# Patient Record
Sex: MALE | Race: WHITE | NOT HISPANIC OR LATINO | Employment: OTHER | URBAN - METROPOLITAN AREA
[De-identification: names, ages, dates, MRNs, and addresses within clinical notes are randomized per-mention and may not be internally consistent; named-entity substitution may affect disease eponyms.]

---

## 2018-11-02 ENCOUNTER — OFFICE VISIT (OUTPATIENT)
Dept: URGENT CARE | Facility: CLINIC | Age: 48
End: 2018-11-02
Payer: MEDICARE

## 2018-11-02 ENCOUNTER — HOSPITAL ENCOUNTER (OUTPATIENT)
Dept: LAB | Facility: MEDICAL CENTER | Age: 48
End: 2018-11-02
Attending: PHYSICIAN ASSISTANT
Payer: MEDICARE

## 2018-11-02 VITALS
WEIGHT: 166 LBS | SYSTOLIC BLOOD PRESSURE: 120 MMHG | RESPIRATION RATE: 16 BRPM | DIASTOLIC BLOOD PRESSURE: 80 MMHG | OXYGEN SATURATION: 98 % | HEART RATE: 82 BPM | TEMPERATURE: 98 F | HEIGHT: 67 IN | BODY MASS INDEX: 26.06 KG/M2

## 2018-11-02 DIAGNOSIS — R10.13 EPIGASTRIC ABDOMINAL PAIN: ICD-10-CM

## 2018-11-02 DIAGNOSIS — Z91.89: ICD-10-CM

## 2018-11-02 DIAGNOSIS — K85.90 ACUTE PANCREATITIS, UNSPECIFIED COMPLICATION STATUS, UNSPECIFIED PANCREATITIS TYPE: Primary | ICD-10-CM

## 2018-11-02 LAB
ALBUMIN SERPL BCP-MCNC: 4.2 G/DL (ref 3.2–4.9)
ALBUMIN/GLOB SERPL: 1.4 G/DL
ALP SERPL-CCNC: 48 U/L (ref 30–99)
ALT SERPL-CCNC: 19 U/L (ref 2–50)
ANION GAP SERPL CALC-SCNC: 6 MMOL/L (ref 0–11.9)
AST SERPL-CCNC: 19 U/L (ref 12–45)
BASOPHILS # BLD AUTO: 0.4 % (ref 0–1.8)
BASOPHILS # BLD: 0.03 K/UL (ref 0–0.12)
BILIRUB SERPL-MCNC: 0.3 MG/DL (ref 0.1–1.5)
BUN SERPL-MCNC: 16 MG/DL (ref 8–22)
CALCIUM SERPL-MCNC: 10 MG/DL (ref 8.5–10.5)
CHLORIDE SERPL-SCNC: 103 MMOL/L (ref 96–112)
CO2 SERPL-SCNC: 30 MMOL/L (ref 20–33)
CREAT SERPL-MCNC: 1.04 MG/DL (ref 0.5–1.4)
EOSINOPHIL # BLD AUTO: 0.18 K/UL (ref 0–0.51)
EOSINOPHIL NFR BLD: 2.2 % (ref 0–6.9)
ERYTHROCYTE [DISTWIDTH] IN BLOOD BY AUTOMATED COUNT: 44.9 FL (ref 35.9–50)
GLOBULIN SER CALC-MCNC: 3.1 G/DL (ref 1.9–3.5)
GLUCOSE SERPL-MCNC: 85 MG/DL (ref 65–99)
HCT VFR BLD AUTO: 46.5 % (ref 42–52)
HGB BLD-MCNC: 15.3 G/DL (ref 14–18)
IMM GRANULOCYTES # BLD AUTO: 0.02 K/UL (ref 0–0.11)
IMM GRANULOCYTES NFR BLD AUTO: 0.2 % (ref 0–0.9)
LIPASE SERPL-CCNC: 143 U/L (ref 11–82)
LYMPHOCYTES # BLD AUTO: 2.9 K/UL (ref 1–4.8)
LYMPHOCYTES NFR BLD: 34.8 % (ref 22–41)
MCH RBC QN AUTO: 31.4 PG (ref 27–33)
MCHC RBC AUTO-ENTMCNC: 32.9 G/DL (ref 33.7–35.3)
MCV RBC AUTO: 95.5 FL (ref 81.4–97.8)
MONOCYTES # BLD AUTO: 0.6 K/UL (ref 0–0.85)
MONOCYTES NFR BLD AUTO: 7.2 % (ref 0–13.4)
NEUTROPHILS # BLD AUTO: 4.61 K/UL (ref 1.82–7.42)
NEUTROPHILS NFR BLD: 55.2 % (ref 44–72)
NRBC # BLD AUTO: 0 K/UL
NRBC BLD-RTO: 0 /100 WBC
PLATELET # BLD AUTO: 327 K/UL (ref 164–446)
PMV BLD AUTO: 10.4 FL (ref 9–12.9)
POTASSIUM SERPL-SCNC: 4.3 MMOL/L (ref 3.6–5.5)
PROT SERPL-MCNC: 7.3 G/DL (ref 6–8.2)
RBC # BLD AUTO: 4.87 M/UL (ref 4.7–6.1)
SODIUM SERPL-SCNC: 139 MMOL/L (ref 135–145)
WBC # BLD AUTO: 8.3 K/UL (ref 4.8–10.8)

## 2018-11-02 PROCEDURE — 85025 COMPLETE CBC W/AUTO DIFF WBC: CPT

## 2018-11-02 PROCEDURE — 80053 COMPREHEN METABOLIC PANEL: CPT

## 2018-11-02 PROCEDURE — 36415 COLL VENOUS BLD VENIPUNCTURE: CPT

## 2018-11-02 PROCEDURE — 99204 OFFICE O/P NEW MOD 45 MIN: CPT | Performed by: PHYSICIAN ASSISTANT

## 2018-11-02 PROCEDURE — 83690 ASSAY OF LIPASE: CPT

## 2018-11-02 RX ORDER — OMEPRAZOLE 20 MG/1
20 CAPSULE, DELAYED RELEASE ORAL 2 TIMES DAILY
Qty: 20 CAP | Refills: 3 | Status: SHIPPED | OUTPATIENT
Start: 2018-11-02 | End: 2018-11-05

## 2018-11-02 NOTE — PATIENT INSTRUCTIONS
Heartburn  Heartburn is a type of pain or discomfort that can happen in the throat or chest. It is often described as a burning pain. It may also cause a bad taste in the mouth. Heartburn may feel worse when you lie down or bend over, and it is often worse at night. Heartburn may be caused by stomach contents that move back up into the esophagus (reflux).  Follow these instructions at home:  Take these actions to decrease your discomfort and to help avoid complications.  Diet  · Follow a diet as recommended by your health care provider. This may involve avoiding foods and drinks such as:  ¨ Coffee and tea (with or without caffeine).  ¨ Drinks that contain alcohol.  ¨ Energy drinks and sports drinks.  ¨ Carbonated drinks or sodas.  ¨ Chocolate and cocoa.  ¨ Peppermint and mint flavorings.  ¨ Garlic and onions.  ¨ Horseradish.  ¨ Spicy and acidic foods, including peppers, chili powder, gonzalez powder, vinegar, hot sauces, and barbecue sauce.  ¨ Citrus fruit juices and citrus fruits, such as oranges, holli, and limes.  ¨ Tomato-based foods, such as red sauce, chili, salsa, and pizza with red sauce.  ¨ Fried and fatty foods, such as donuts, french fries, potato chips, and high-fat dressings.  ¨ High-fat meats, such as hot dogs and fatty cuts of red and white meats, such as rib eye steak, sausage, ham, and medrano.  ¨ High-fat dairy items, such as whole milk, butter, and cream cheese.  · Eat small, frequent meals instead of large meals.  · Avoid drinking large amounts of liquid with your meals.  · Avoid eating meals during the 2-3 hours before bedtime.  · Avoid lying down right after you eat.  · Do not exercise right after you eat.  General instructions  · Pay attention to any changes in your symptoms.  · Take over-the-counter and prescription medicines only as told by your health care provider. Do not take aspirin, ibuprofen, or other NSAIDs unless your health care provider told you to do so.  · Do not use any tobacco  products, including cigarettes, chewing tobacco, and e-cigarettes. If you need help quitting, ask your health care provider.  · Wear loose-fitting clothing. Do not wear anything tight around your waist that causes pressure on your abdomen.  · Raise (elevate) the head of your bed about 6 inches (15 cm).  · Try to reduce your stress, such as with yoga or meditation. If you need help reducing stress, ask your health care provider.  · If you are overweight, reduce your weight to an amount that is healthy for you. Ask your health care provider for guidance about a safe weight loss goal.  · Keep all follow-up visits as told by your health care provider. This is important.  Contact a health care provider if:  · You have new symptoms.  · You have unexplained weight loss.  · You have difficulty swallowing, or it hurts to swallow.  · You have wheezing or a persistent cough.  · Your symptoms do not improve with treatment.  · You have frequent heartburn for more than two weeks.  Get help right away if:  · You have pain in your arms, neck, jaw, teeth, or back.  · You feel sweaty, dizzy, or light-headed.  · You have chest pain or shortness of breath.  · You vomit and your vomit looks like blood or coffee grounds.  · Your stool is bloody or black.  This information is not intended to replace advice given to you by your health care provider. Make sure you discuss any questions you have with your health care provider.  Document Released: 05/06/2010 Document Revised: 05/25/2017 Document Reviewed: 04/13/2016  [employer] Interactive Patient Education © 2017 [employer]

## 2018-11-03 NOTE — PROGRESS NOTES
"Subjective:      Pt is a 48 y.o. male who presents with Spider Bite (x 2 wks, spider bite on Rt. arm, upper abdominal pain off / on)            HPI  Pt notes upper abd pain x 2 weeks on and off since suspected \"black  bite\" he received while outside. Pt notes that area on his upper right arm has healed but canno think of any other connection to the new onset upper abd pain. PT notes he has had many black  bites in the past but shortly afterwards noted epigastric abd pain, on and off x 2 weeks as well. PT states he consumes alcohol only \"two days a year\". Pt denies NVD. Pt has not taken any Rx medications for this condition. Pt states the pain is a 7/10, aching in nature and worse at night. Pt denies CP, SOB, NVD, paresthesias, headaches, dizziness, change in vision, hives, or other joint pain. The pt's medication list, problem list, and allergies have been evaluated and reviewed during today's visit.    PMH:  Negative per pt.      PSH:  Negative per pt.      Fam Hx:  the patient's family history is not pertinent to their current complaint      Soc HX:  Social History     Social History   • Marital status: Single     Spouse name: N/A   • Number of children: N/A   • Years of education: N/A     Occupational History   • Not on file.     Social History Main Topics   • Smoking status: Current Every Day Smoker     Packs/day: 1.00     Years: 22.00     Types: Cigarettes   • Smokeless tobacco: Never Used   • Alcohol use Yes      Comment: occ   • Drug use: No   • Sexual activity: Not on file     Other Topics Concern   • Not on file     Social History Narrative   • No narrative on file         Medications:    Current Outpatient Prescriptions:   •  omeprazole (PRILOSEC) 20 MG delayed-release capsule, Take 1 Cap by mouth 2 times a day for 10 days., Disp: 20 Cap, Rfl: 3      Allergies:  Patient has no known allergies.       ROS  Constitutional: Negative for fever, chills and malaise/fatigue.   HENT: Negative for " "congestion and sore throat.    Eyes: Negative for blurred vision, double vision and photophobia.   Respiratory: Negative for cough and shortness of breath.  Cardiovascular: Negative for chest pain and palpitations.   Gastrointestinal: Negative for heartburn, nausea, vomiting, diarrhea and constipation. POS for upper abd pain  Genitourinary: Negative for dysuria and flank pain.   Musculoskeletal: Negative for joint pain and myalgias.   Skin: Negative for itching and rash. POS for healing insect/or spider bite of right upper arm  Neurological: Negative for dizziness, tingling and headaches.   Endo/Heme/Allergies: Does not bruise/bleed easily.   Psychiatric/Behavioral: Negative for depression. The patient is not nervous/anxious.           Objective:     /80 (BP Location: Left arm, Patient Position: Sitting, BP Cuff Size: Adult)   Pulse 82   Temp 36.7 °C (98 °F) (Temporal)   Resp 16   Ht 1.702 m (5' 7\")   Wt 75.3 kg (166 lb)   SpO2 98%   BMI 26.00 kg/m²      Physical Exam   Abdominal: Soft. Normal appearance and bowel sounds are normal. He exhibits no shifting dullness, no distension, no pulsatile liver, no fluid wave, no abdominal bruit, no ascites, no pulsatile midline mass and no mass. There is no hepatosplenomegaly. There is tenderness in the epigastric area. There is guarding. There is no rigidity, no rebound, no CVA tenderness, no tenderness at McBurney's point and negative Brito's sign. No hernia.       Skin: Skin is warm. Capillary refill takes less than 2 seconds. No rash noted.               Constitutional: PT is oriented to person, place, and time. PT appears well-developed and well-nourished. No distress.   HENT:   Head: Normocephalic and atraumatic.   Mouth/Throat: Oropharynx is clear and moist. No oropharyngeal exudate.   Eyes: Conjunctivae normal and EOM are normal. Pupils are equal, round, and reactive to light.   Neck: Normal range of motion. Neck supple. No thyromegaly present.   " "  Cardiovascular: Normal rate, regular rhythm, normal heart sounds and intact distal pulses.  Exam reveals no gallop and no friction rub.    No murmur heard.  Pulmonary/Chest: Effort normal and breath sounds normal. No respiratory distress. PT has no wheezes. PT has no rales. Pt exhibits no tenderness.   Musculoskeletal: Normal range of motion. PT exhibits no edema and no tenderness.   Neurological: PT is alert and oriented to person, place, and time. PT has normal reflexes. No cranial nerve deficit.       Psychiatric: PT has a normal mood and affect. PT behavior is normal. Judgment and thought content normal.          Assessment/Plan:     1. Acute pancreatitis, unspecified complication status, unspecified pancreatitis type      2. Epigastric abdominal pain    - omeprazole (PRILOSEC) 20 MG delayed-release capsule; Take 1 Cap by mouth 2 times a day for 10 days.  Dispense: 20 Cap; Refill: 3  - LIPASE; Future  - CBC WITH DIFFERENTIAL; Future  - COMP METABOLIC PANEL; Future    3. History of venomous spider bite    \"Black \" bite appears well healed on right biceps region and is noted to be over 2 weeks old  PT called and notified of elevated Lipase to 143 (normal range 11-82) and likely pancreatitis diagnosis and encouraged to get checked out at a higher level of triage at the ER for further work-up and management as he notes the pain continues today and he is having daily flare ups x 2 weeks.   Rest, fluids encouraged.  AVS with medical info given.  Pt was in full understanding and agreement with the plan.  Follow-up as needed if symptoms worsen or fail to improve.        "

## 2018-11-05 ENCOUNTER — OFFICE VISIT (OUTPATIENT)
Dept: URGENT CARE | Facility: MEDICAL CENTER | Age: 48
End: 2018-11-05
Payer: MEDICARE

## 2018-11-05 ENCOUNTER — HOSPITAL ENCOUNTER (EMERGENCY)
Facility: MEDICAL CENTER | Age: 48
End: 2018-11-05
Attending: EMERGENCY MEDICINE
Payer: MEDICARE

## 2018-11-05 ENCOUNTER — APPOINTMENT (OUTPATIENT)
Dept: RADIOLOGY | Facility: MEDICAL CENTER | Age: 48
End: 2018-11-05
Attending: EMERGENCY MEDICINE
Payer: MEDICARE

## 2018-11-05 VITALS
WEIGHT: 166.01 LBS | SYSTOLIC BLOOD PRESSURE: 168 MMHG | OXYGEN SATURATION: 99 % | HEIGHT: 67 IN | TEMPERATURE: 97.7 F | HEART RATE: 62 BPM | DIASTOLIC BLOOD PRESSURE: 102 MMHG | RESPIRATION RATE: 16 BRPM | BODY MASS INDEX: 26.06 KG/M2

## 2018-11-05 VITALS
HEART RATE: 75 BPM | TEMPERATURE: 97.9 F | HEIGHT: 67 IN | WEIGHT: 166 LBS | SYSTOLIC BLOOD PRESSURE: 178 MMHG | DIASTOLIC BLOOD PRESSURE: 104 MMHG | OXYGEN SATURATION: 100 % | BODY MASS INDEX: 26.06 KG/M2

## 2018-11-05 DIAGNOSIS — R10.13 EPIGASTRIC ABDOMINAL PAIN: ICD-10-CM

## 2018-11-05 DIAGNOSIS — K85.90 ACUTE PANCREATITIS, UNSPECIFIED COMPLICATION STATUS, UNSPECIFIED PANCREATITIS TYPE: ICD-10-CM

## 2018-11-05 DIAGNOSIS — R10.13 EPIGASTRIC PAIN: ICD-10-CM

## 2018-11-05 LAB
ALBUMIN SERPL BCP-MCNC: 4 G/DL (ref 3.2–4.9)
ALBUMIN/GLOB SERPL: 1.3 G/DL
ALP SERPL-CCNC: 50 U/L (ref 30–99)
ALT SERPL-CCNC: 21 U/L (ref 2–50)
ANION GAP SERPL CALC-SCNC: 8 MMOL/L (ref 0–11.9)
AST SERPL-CCNC: 21 U/L (ref 12–45)
BASOPHILS # BLD AUTO: 0.3 % (ref 0–1.8)
BASOPHILS # BLD: 0.03 K/UL (ref 0–0.12)
BILIRUB SERPL-MCNC: 0.8 MG/DL (ref 0.1–1.5)
BUN SERPL-MCNC: 17 MG/DL (ref 8–22)
CALCIUM SERPL-MCNC: 8.7 MG/DL (ref 8.4–10.2)
CHLORIDE SERPL-SCNC: 101 MMOL/L (ref 96–112)
CO2 SERPL-SCNC: 23 MMOL/L (ref 20–33)
CREAT SERPL-MCNC: 1.04 MG/DL (ref 0.5–1.4)
EKG IMPRESSION: NORMAL
EOSINOPHIL # BLD AUTO: 0.19 K/UL (ref 0–0.51)
EOSINOPHIL NFR BLD: 1.9 % (ref 0–6.9)
ERYTHROCYTE [DISTWIDTH] IN BLOOD BY AUTOMATED COUNT: 42.2 FL (ref 35.9–50)
GLOBULIN SER CALC-MCNC: 3.2 G/DL (ref 1.9–3.5)
GLUCOSE SERPL-MCNC: 101 MG/DL (ref 65–99)
HCT VFR BLD AUTO: 42.2 % (ref 42–52)
HGB BLD-MCNC: 14.6 G/DL (ref 14–18)
IMM GRANULOCYTES # BLD AUTO: 0.03 K/UL (ref 0–0.11)
IMM GRANULOCYTES NFR BLD AUTO: 0.3 % (ref 0–0.9)
LIPASE SERPL-CCNC: 40 U/L (ref 7–58)
LYMPHOCYTES # BLD AUTO: 2.41 K/UL (ref 1–4.8)
LYMPHOCYTES NFR BLD: 23.8 % (ref 22–41)
MCH RBC QN AUTO: 31.9 PG (ref 27–33)
MCHC RBC AUTO-ENTMCNC: 34.6 G/DL (ref 33.7–35.3)
MCV RBC AUTO: 92.3 FL (ref 81.4–97.8)
MONOCYTES # BLD AUTO: 0.75 K/UL (ref 0–0.85)
MONOCYTES NFR BLD AUTO: 7.4 % (ref 0–13.4)
NEUTROPHILS # BLD AUTO: 6.71 K/UL (ref 1.82–7.42)
NEUTROPHILS NFR BLD: 66.3 % (ref 44–72)
NRBC # BLD AUTO: 0 K/UL
NRBC BLD-RTO: 0 /100 WBC
PLATELET # BLD AUTO: 282 K/UL (ref 164–446)
PMV BLD AUTO: 9.6 FL (ref 9–12.9)
POTASSIUM SERPL-SCNC: 3.6 MMOL/L (ref 3.6–5.5)
PROT SERPL-MCNC: 7.2 G/DL (ref 6–8.2)
RBC # BLD AUTO: 4.57 M/UL (ref 4.7–6.1)
SODIUM SERPL-SCNC: 132 MMOL/L (ref 135–145)
WBC # BLD AUTO: 10.1 K/UL (ref 4.8–10.8)

## 2018-11-05 PROCEDURE — 96374 THER/PROPH/DIAG INJ IV PUSH: CPT

## 2018-11-05 PROCEDURE — 99285 EMERGENCY DEPT VISIT HI MDM: CPT

## 2018-11-05 PROCEDURE — 93005 ELECTROCARDIOGRAM TRACING: CPT | Performed by: EMERGENCY MEDICINE

## 2018-11-05 PROCEDURE — 85025 COMPLETE CBC W/AUTO DIFF WBC: CPT

## 2018-11-05 PROCEDURE — 96375 TX/PRO/DX INJ NEW DRUG ADDON: CPT

## 2018-11-05 PROCEDURE — 700117 HCHG RX CONTRAST REV CODE 255: Performed by: EMERGENCY MEDICINE

## 2018-11-05 PROCEDURE — 93005 ELECTROCARDIOGRAM TRACING: CPT

## 2018-11-05 PROCEDURE — 83690 ASSAY OF LIPASE: CPT

## 2018-11-05 PROCEDURE — 80053 COMPREHEN METABOLIC PANEL: CPT

## 2018-11-05 PROCEDURE — 700111 HCHG RX REV CODE 636 W/ 250 OVERRIDE (IP): Performed by: EMERGENCY MEDICINE

## 2018-11-05 PROCEDURE — 74177 CT ABD & PELVIS W/CONTRAST: CPT

## 2018-11-05 PROCEDURE — 36415 COLL VENOUS BLD VENIPUNCTURE: CPT

## 2018-11-05 RX ORDER — SUCRALFATE 1 G/1
1 TABLET ORAL
Qty: 12 TAB | Refills: 0 | Status: SHIPPED | OUTPATIENT
Start: 2018-11-05 | End: 2018-11-08

## 2018-11-05 RX ORDER — ONDANSETRON 2 MG/ML
4 INJECTION INTRAMUSCULAR; INTRAVENOUS ONCE
Status: COMPLETED | OUTPATIENT
Start: 2018-11-05 | End: 2018-11-05

## 2018-11-05 RX ORDER — OMEPRAZOLE 20 MG/1
20 CAPSULE, DELAYED RELEASE ORAL DAILY
Qty: 30 CAP | Refills: 0 | Status: SHIPPED | OUTPATIENT
Start: 2018-11-05 | End: 2019-05-05

## 2018-11-05 RX ORDER — HYDROCODONE BITARTRATE AND ACETAMINOPHEN 5; 325 MG/1; MG/1
1-2 TABLET ORAL EVERY 6 HOURS PRN
Qty: 12 TAB | Refills: 0 | Status: SHIPPED | OUTPATIENT
Start: 2018-11-05 | End: 2018-11-08

## 2018-11-05 RX ORDER — COVID-19 ANTIGEN TEST
220 KIT MISCELLANEOUS PRN
Status: SHIPPED | COMMUNITY
End: 2019-06-10

## 2018-11-05 RX ORDER — MORPHINE SULFATE 4 MG/ML
4 INJECTION, SOLUTION INTRAMUSCULAR; INTRAVENOUS ONCE
Status: COMPLETED | OUTPATIENT
Start: 2018-11-05 | End: 2018-11-05

## 2018-11-05 RX ADMIN — MORPHINE SULFATE 4 MG: 4 INJECTION INTRAVENOUS at 12:38

## 2018-11-05 RX ADMIN — ONDANSETRON 4 MG: 2 INJECTION INTRAMUSCULAR; INTRAVENOUS at 12:38

## 2018-11-05 RX ADMIN — IOHEXOL 100 ML: 350 INJECTION, SOLUTION INTRAVENOUS at 14:10

## 2018-11-05 ASSESSMENT — PAIN SCALES - GENERAL
PAINLEVEL_OUTOF10: 1
PAINLEVEL_OUTOF10: 10

## 2018-11-05 ASSESSMENT — ENCOUNTER SYMPTOMS: ABDOMINAL PAIN: 1

## 2018-11-05 NOTE — ED NOTES
D/c pt home with family , 3 rx given . Pt aware of f/u instructions PMD ,   Aware to f/u in regards to his HTN with his pmd  aware to return for any changes or concerns. No further questions upon d/c home from ed  Pt given and understands controlled substance use consent form signed  Pt aware to no drive or operate vehicle after receiving or taking medication

## 2018-11-05 NOTE — ED PROVIDER NOTES
ED Provider Note      ER PROVIDER NOTE      CHIEF COMPLAINT  Chief Complaint   Patient presents with   • Abdominal Pain     Pt c/o abd pain x 2 weeks. Pt c/o little nausea and diarrhea. Pt states sent from  for further evaluation. Pt states Dx with pancreatitis.        HPI  Tima Rizzo is a 48 y.o. male who presents to the emergency department complaining of epigastric pain.  Patient reports he has had pain over the last 2 weeks that has been gradually increasing.  He has had some slight nausea but no vomiting.  Has been having normal bowel movements without diarrhea constipation or blood.  States the pain has become sharp and fairly constant with no alleviating or aggravating factors.  He was seen at urgent care with elevated lipase 2 days ago.  No prior abdominal surgeries, he does not drink alcohol but does take Aleve on a frequent basis    REVIEW OF SYSTEMS  Pertinent positives include abdominal pain. Pertinent negatives include no fever. See HPI for details. All other systems reviewed and are negative.    PAST MEDICAL HISTORY       SURGICAL HISTORY  patient denies any surgical history    FAMILY HISTORY  No family history on file.    SOCIAL HISTORY  Social History     Social History   • Marital status: Single     Spouse name: N/A   • Number of children: N/A   • Years of education: N/A     Social History Main Topics   • Smoking status: Current Every Day Smoker     Packs/day: 0.50     Years: 22.00     Types: Cigarettes   • Smokeless tobacco: Never Used   • Alcohol use No      Comment: occ   • Drug use: Yes     Types: Marijuana   • Sexual activity: Not on file     Other Topics Concern   • Not on file     Social History Narrative   • No narrative on file      History   Drug Use   • Types: Marijuana       CURRENT MEDICATIONS  Home Medications     Reviewed by Stefani Au (Pharmacy Tech) on 11/05/18 at 1233  Med List Status: Complete   Medication Last Dose Status   Naproxen Sodium (ALEVE) 220 MG Cap  "> 2 days Active                ALLERGIES  No Known Allergies    PHYSICAL EXAM  VITAL SIGNS: BP (!) 174/120   Pulse 60   Temp 36.5 °C (97.7 °F)   Resp 12   Ht 1.702 m (5' 7\")   Wt 75.3 kg (166 lb 0.1 oz)   SpO2 99%   BMI 26.00 kg/m²   Pulse ox interpretation: I interpret this pulse ox as normal.    Constitutional: Alert, uncomfortable  HENT: No signs of trauma, Bilateral external ears normal, Nose normal.   Eyes: Pupils are equal and reactive, Conjunctiva normal, Non-icteric.   Neck: Normal range of motion, No tenderness, Supple, No stridor.   Lymphatic: No lymphadenopathy noted.   Cardiovascular: Regular rate and rhythm, no murmurs.   Thorax & Lungs: Normal breath sounds, No respiratory distress, No wheezing, No chest tenderness.   Abdomen: Bowel sounds normal, Soft, moderate epigastric tenderness, No masses, No pulsatile masses. No peritoneal signs.  Skin: Warm, Dry, No erythema, No rash.   Back: No bony tenderness, No CVA tenderness.   Extremities: Intact distal pulses, No edema, No tenderness, No cyanosis, Negative Lawrence's sign.  Musculoskeletal: Good range of motion in all major joints. No tenderness to palpation or major deformities noted.   Neurologic: Alert , Normal motor function, Normal sensory function, No focal deficits noted.   Psychiatric: Affect normal, Judgment normal, Mood normal.     DIAGNOSTIC STUDIES / PROCEDURES    Results for orders placed or performed during the hospital encounter of 11/05/18   CBC WITH DIFFERENTIAL   Result Value Ref Range    WBC 10.1 4.8 - 10.8 K/uL    RBC 4.57 (L) 4.70 - 6.10 M/uL    Hemoglobin 14.6 14.0 - 18.0 g/dL    Hematocrit 42.2 42.0 - 52.0 %    MCV 92.3 81.4 - 97.8 fL    MCH 31.9 27.0 - 33.0 pg    MCHC 34.6 33.7 - 35.3 g/dL    RDW 42.2 35.9 - 50.0 fL    Platelet Count 282 164 - 446 K/uL    MPV 9.6 9.0 - 12.9 fL    Neutrophils-Polys 66.30 44.00 - 72.00 %    Lymphocytes 23.80 22.00 - 41.00 %    Monocytes 7.40 0.00 - 13.40 %    Eosinophils 1.90 0.00 - 6.90 %    " Basophils 0.30 0.00 - 1.80 %    Immature Granulocytes 0.30 0.00 - 0.90 %    Nucleated RBC 0.00 /100 WBC    Neutrophils (Absolute) 6.71 1.82 - 7.42 K/uL    Lymphs (Absolute) 2.41 1.00 - 4.80 K/uL    Monos (Absolute) 0.75 0.00 - 0.85 K/uL    Eos (Absolute) 0.19 0.00 - 0.51 K/uL    Baso (Absolute) 0.03 0.00 - 0.12 K/uL    Immature Granulocytes (abs) 0.03 0.00 - 0.11 K/uL    NRBC (Absolute) 0.00 K/uL   COMP METABOLIC PANEL   Result Value Ref Range    Sodium 132 (L) 135 - 145 mmol/L    Potassium 3.6 3.6 - 5.5 mmol/L    Chloride 101 96 - 112 mmol/L    Co2 23 20 - 33 mmol/L    Anion Gap 8.0 0.0 - 11.9    Glucose 101 (H) 65 - 99 mg/dL    Bun 17 8 - 22 mg/dL    Creatinine 1.04 0.50 - 1.40 mg/dL    Calcium 8.7 8.4 - 10.2 mg/dL    AST(SGOT) 21 12 - 45 U/L    ALT(SGPT) 21 2 - 50 U/L    Alkaline Phosphatase 50 30 - 99 U/L    Total Bilirubin 0.8 0.1 - 1.5 mg/dL    Albumin 4.0 3.2 - 4.9 g/dL    Total Protein 7.2 6.0 - 8.2 g/dL    Globulin 3.2 1.9 - 3.5 g/dL    A-G Ratio 1.3 g/dL   LIPASE   Result Value Ref Range    Lipase 40 7 - 58 U/L   ESTIMATED GFR   Result Value Ref Range    GFR If African American >60 >60 mL/min/1.73 m 2    GFR If Non African American >60 >60 mL/min/1.73 m 2   EKG   Result Value Ref Range    Report       [facility] Emergency Dept.    Test Date:  2018  Pt Name:    JOSELINE SANTIAGO                  Department: [facility]  MRN:        [id number]                      [facility]   Gender:     Male                         Technician: ANN-MARIE  :        1970                   Requested By:ER TRIAGE PROTOCOL  Order #:    [id number]                    Rell MD:    Measurements  Intervals                                Axis  Rate:       68                           P:          9  AZ:         128                          QRS:        37  QRSD:       92                           T:          40  QT:         452  QTc:        481    Interpretive Statements  SINUS RHYTHM  BORDERLINE PROLONGED  QT INTERVAL  BASELINE WANDER IN LEAD(S) V1  Compared to ECG 12/31/2013 09:48:54  No significant changes         RADIOLOGY  CT-ABDOMEN-PELVIS WITH   Final Result         1. No acute abnormality identified in the abdomen or pelvis.        The radiologist's interpretation of all radiological studies have been reviewed by me.    COURSE & MEDICAL DECISION MAKING  Nursing notes, VS, PMSFHx reviewed in chart.    12:28 PM Patient seen and examined at bedside. Patient will be treated with morphine, Zofran. Ordered for labs, CT to evaluate his symptoms.     2:14 PM  Patient reevaluated, states his pain is much improved, abdomen is soft, minimally tender at this time, pending CT  2:45 PM  Patient reevaluated again, still improved, abdomen soft, minimally tender, updated on results and will plan for discharge          Decision Making:  This is a 48 y.o. male presented with epigastric pain.  This does seem more gastric in nature given his frequent Aleve use and nature of his symptoms.  Will prescribe omeprazole, Carafate, as well as short course of Norco to help with his pain over the next few days.  There was some concern about his pancreas as he had an outpatient lipase from a few days ago that was mildly elevated.  This has normalized today he does not use alcohol there is no evidence of biliary obstructive process on his labs or imaging here or cholecystitis.  CT was obtained and there is no evidence of obstruction or other surgical process he has no lower abdominal pain or findings on CT suggestive of appendicitis or similar.  I have arranged outpatient follow-up with our  for the patient, discussed strict return precautions with him which he understands well    I reviewed prescription monitoring program for patient's narcotic use before prescribing a scheduled drug.The patient will not drink alcohol nor drive with prescribed medications. The patient will return for new or worsening symptoms and is stable at the  time of discharge.    The patient is referred to a primary physician for blood pressure management, diabetic screening, and for all other preventative health concerns.  In prescribing controlled substances to this patient, I certify that I have obtained and reviewed the medical history of Tima Rizzo. I have also made a good josefa effort to obtain applicable records from other providers who have treated the patient and records did not demonstrate any increased risk of substance abuse that would prevent me from prescribing controlled substances.     I have conducted a physical exam and documented it. I have reviewed Mr. Rizzo’s prescription history as maintained by the [place] Prescription Monitoring Program.     I have assessed the patient’s risk for abuse, dependency, and addiction using the validated Opioid Risk Tool available at https://www.mdcalc.com/tpebns-lucm-quub-ort-narcotic-abuse.     Given the above, I believe the benefits of controlled substance therapy outweigh the risks. The reasons for prescribing controlled substances include non-narcotic, oral analgesic alternatives have been inadequate for pain control. Accordingly, I have discussed the risk and benefits, treatment plan, and alternative therapies with the patient.         DISPOSITION:  Patient will be discharged home in stable condition.    FOLLOW UP:  No follow-up provider specified.    OUTPATIENT MEDICATIONS:  New Prescriptions    HYDROCODONE-ACETAMINOPHEN (NORCO) 5-325 MG TAB PER TABLET    Take 1-2 Tabs by mouth every 6 hours as needed (pain) for up to 3 days.    OMEPRAZOLE (PRILOSEC) 20 MG DELAYED-RELEASE CAPSULE    Take 1 Cap by mouth every day.    OMEPRAZOLE (PRILOSEC) 20 MG DELAYED-RELEASE CAPSULE    Take 1 Cap by mouth every day.    SUCRALFATE (CARAFATE) 1 GM TAB    Take 1 Tab by mouth 4 Times a Day,Before Meals and at Bedtime for 3 days.         FINAL IMPRESSION  1. Epigastric abdominal pain         The note accurately reflects work  and decisions made by me.  Demetris Rodrigues  11/5/2018  2:50 PM

## 2018-11-05 NOTE — PROGRESS NOTES
"Subjective:      Tima Rizzo is a 48 y.o. male who presents with Pancreatitis and Abdominal Cramps (1 hr)            This is a new problem. Pt presents to  with complaints of recently being dx with pancreatitis 3 days ago. He states he was told to come to the [facility] to be evaluated further in the  for an extensive work up and possible hospitalization. He admits his epigastric pain has gotten worse in the last 3 days. Nothing is helping the pain. He denies any recent fevers or vomiting.        Review of Systems   Gastrointestinal: Positive for abdominal pain.   All other systems reviewed and are negative.      No past medical history on file. No past surgical history on file.   Social History     Social History   • Marital status: Single     Spouse name: N/A   • Number of children: N/A   • Years of education: N/A     Occupational History   • Not on file.     Social History Main Topics   • Smoking status: Current Every Day Smoker     Packs/day: 0.50     Years: 22.00     Types: Cigarettes   • Smokeless tobacco: Never Used   • Alcohol use No      Comment: occ   • Drug use: Yes     Types: Marijuana   • Sexual activity: Not on file     Other Topics Concern   • Not on file     Social History Narrative   • No narrative on file        Objective:     BP (!) 178/104   Pulse 75   Temp 36.6 °C (97.9 °F)   Ht 1.702 m (5' 7\")   Wt 75.3 kg (166 lb)   SpO2 100%   BMI 26.00 kg/m²      Physical Exam   Constitutional: He is oriented to person, place, and time. Vital signs are normal. He appears well-developed and well-nourished. He appears distressed.   HENT:   Head: Normocephalic and atraumatic.   Eyes: Pupils are equal, round, and reactive to light. EOM are normal.   Neck: Normal range of motion.   Cardiovascular: Normal rate and regular rhythm.    Pulmonary/Chest: Effort normal.   Abdominal: There is tenderness (exquisite) in the epigastric area.   Musculoskeletal: Normal range of motion.   Neurological: " He is alert and oriented to person, place, and time.   Skin: Skin is warm and dry. Capillary refill takes less than 2 seconds.   Psychiatric: He has a normal mood and affect. His speech is normal and behavior is normal. Thought content normal.   Vitals reviewed.              Assessment/Plan:     1. Epigastric pain    2. Acute pancreatitis, unspecified complication status, unspecified pancreatitis type    Pt appears to be in acute distress secondary to epigastric pain  BP is also significantly elevated but this is likely secondary to pain  Advised pt I believe he misunderstood the directions and he needs to present to the ED at [facility], not the   At this point it appears he will need to be NPO with IV fluids and pain meds, likely hospitalization will be required  He understands  He and his family member will ambulate to the ER  Ambulated OOC with steady gait

## 2018-11-05 NOTE — ED NOTES
Med rec updated and complete  Allergies reviewed  Interviewed pt with mother at bedside with permission from pt.  Pt reports no prescription medications or vitamins.  Pt reports no antibiotics in the last 30 days.  Pt reports that he did not  his OMEPRAZOLE 20MG at the pharmacy.

## 2018-11-28 PROBLEM — R10.13 EPIGASTRIC PAIN: Status: ACTIVE | Noted: 2018-11-28

## 2019-05-05 ENCOUNTER — APPOINTMENT (OUTPATIENT)
Dept: RADIOLOGY | Facility: MEDICAL CENTER | Age: 49
End: 2019-05-05
Attending: EMERGENCY MEDICINE
Payer: MEDICARE

## 2019-05-05 ENCOUNTER — HOSPITAL ENCOUNTER (EMERGENCY)
Facility: MEDICAL CENTER | Age: 49
End: 2019-05-05
Attending: EMERGENCY MEDICINE
Payer: MEDICARE

## 2019-05-05 VITALS
TEMPERATURE: 98.1 F | HEART RATE: 60 BPM | WEIGHT: 154.32 LBS | SYSTOLIC BLOOD PRESSURE: 177 MMHG | OXYGEN SATURATION: 96 % | RESPIRATION RATE: 18 BRPM | HEIGHT: 67 IN | BODY MASS INDEX: 24.22 KG/M2 | DIASTOLIC BLOOD PRESSURE: 116 MMHG

## 2019-05-05 DIAGNOSIS — R73.9 HYPERGLYCEMIA: ICD-10-CM

## 2019-05-05 DIAGNOSIS — R10.13 EPIGASTRIC ABDOMINAL PAIN: ICD-10-CM

## 2019-05-05 LAB
ALBUMIN SERPL BCP-MCNC: 4.1 G/DL (ref 3.2–4.9)
ALBUMIN/GLOB SERPL: 1.2 G/DL
ALP SERPL-CCNC: 56 U/L (ref 30–99)
ALT SERPL-CCNC: 18 U/L (ref 2–50)
ANION GAP SERPL CALC-SCNC: 5 MMOL/L (ref 0–11.9)
AST SERPL-CCNC: 19 U/L (ref 12–45)
BASOPHILS # BLD AUTO: 0.4 % (ref 0–1.8)
BASOPHILS # BLD: 0.04 K/UL (ref 0–0.12)
BILIRUB SERPL-MCNC: 0.4 MG/DL (ref 0.1–1.5)
BUN SERPL-MCNC: 16 MG/DL (ref 8–22)
CALCIUM SERPL-MCNC: 9.2 MG/DL (ref 8.4–10.2)
CHLORIDE SERPL-SCNC: 100 MMOL/L (ref 96–112)
CO2 SERPL-SCNC: 30 MMOL/L (ref 20–33)
CREAT SERPL-MCNC: 1.11 MG/DL (ref 0.5–1.4)
EKG IMPRESSION: NORMAL
EOSINOPHIL # BLD AUTO: 0.28 K/UL (ref 0–0.51)
EOSINOPHIL NFR BLD: 3 % (ref 0–6.9)
ERYTHROCYTE [DISTWIDTH] IN BLOOD BY AUTOMATED COUNT: 45.6 FL (ref 35.9–50)
GLOBULIN SER CALC-MCNC: 3.5 G/DL (ref 1.9–3.5)
GLUCOSE SERPL-MCNC: 214 MG/DL (ref 65–99)
HCT VFR BLD AUTO: 45 % (ref 42–52)
HGB BLD-MCNC: 15.1 G/DL (ref 14–18)
IMM GRANULOCYTES # BLD AUTO: 0.02 K/UL (ref 0–0.11)
IMM GRANULOCYTES NFR BLD AUTO: 0.2 % (ref 0–0.9)
LIPASE SERPL-CCNC: 49 U/L (ref 7–58)
LYMPHOCYTES # BLD AUTO: 2.28 K/UL (ref 1–4.8)
LYMPHOCYTES NFR BLD: 24.7 % (ref 22–41)
MCH RBC QN AUTO: 31.7 PG (ref 27–33)
MCHC RBC AUTO-ENTMCNC: 33.6 G/DL (ref 33.7–35.3)
MCV RBC AUTO: 94.3 FL (ref 81.4–97.8)
MONOCYTES # BLD AUTO: 0.64 K/UL (ref 0–0.85)
MONOCYTES NFR BLD AUTO: 6.9 % (ref 0–13.4)
NEUTROPHILS # BLD AUTO: 5.98 K/UL (ref 1.82–7.42)
NEUTROPHILS NFR BLD: 64.8 % (ref 44–72)
NRBC # BLD AUTO: 0 K/UL
NRBC BLD-RTO: 0 /100 WBC
PLATELET # BLD AUTO: 353 K/UL (ref 164–446)
PMV BLD AUTO: 9.6 FL (ref 9–12.9)
POTASSIUM SERPL-SCNC: 3.8 MMOL/L (ref 3.6–5.5)
PROT SERPL-MCNC: 7.6 G/DL (ref 6–8.2)
RBC # BLD AUTO: 4.77 M/UL (ref 4.7–6.1)
SODIUM SERPL-SCNC: 135 MMOL/L (ref 135–145)
TROPONIN I SERPL-MCNC: <0.02 NG/ML (ref 0–0.04)
WBC # BLD AUTO: 9.2 K/UL (ref 4.8–10.8)

## 2019-05-05 PROCEDURE — 83690 ASSAY OF LIPASE: CPT

## 2019-05-05 PROCEDURE — 96375 TX/PRO/DX INJ NEW DRUG ADDON: CPT

## 2019-05-05 PROCEDURE — 80053 COMPREHEN METABOLIC PANEL: CPT

## 2019-05-05 PROCEDURE — 93005 ELECTROCARDIOGRAM TRACING: CPT

## 2019-05-05 PROCEDURE — 700102 HCHG RX REV CODE 250 W/ 637 OVERRIDE(OP): Performed by: EMERGENCY MEDICINE

## 2019-05-05 PROCEDURE — 99285 EMERGENCY DEPT VISIT HI MDM: CPT

## 2019-05-05 PROCEDURE — 96374 THER/PROPH/DIAG INJ IV PUSH: CPT

## 2019-05-05 PROCEDURE — 76705 ECHO EXAM OF ABDOMEN: CPT

## 2019-05-05 PROCEDURE — 84484 ASSAY OF TROPONIN QUANT: CPT

## 2019-05-05 PROCEDURE — 36415 COLL VENOUS BLD VENIPUNCTURE: CPT

## 2019-05-05 PROCEDURE — 85025 COMPLETE CBC W/AUTO DIFF WBC: CPT

## 2019-05-05 PROCEDURE — 700111 HCHG RX REV CODE 636 W/ 250 OVERRIDE (IP): Performed by: EMERGENCY MEDICINE

## 2019-05-05 PROCEDURE — A9270 NON-COVERED ITEM OR SERVICE: HCPCS | Performed by: EMERGENCY MEDICINE

## 2019-05-05 RX ORDER — ONDANSETRON 2 MG/ML
4 INJECTION INTRAMUSCULAR; INTRAVENOUS ONCE
Status: COMPLETED | OUTPATIENT
Start: 2019-05-05 | End: 2019-05-05

## 2019-05-05 RX ORDER — OMEPRAZOLE 20 MG/1
20 TABLET, DELAYED RELEASE ORAL DAILY
Qty: 30 TAB | Refills: 0 | Status: SHIPPED | OUTPATIENT
Start: 2019-05-05 | End: 2019-06-10

## 2019-05-05 RX ORDER — MORPHINE SULFATE 4 MG/ML
4 INJECTION, SOLUTION INTRAMUSCULAR; INTRAVENOUS ONCE
Status: COMPLETED | OUTPATIENT
Start: 2019-05-05 | End: 2019-05-05

## 2019-05-05 RX ORDER — OMEPRAZOLE 20 MG/1
20 CAPSULE, DELAYED RELEASE ORAL PRN
Status: ON HOLD | COMMUNITY
End: 2019-06-13

## 2019-05-05 RX ADMIN — ONDANSETRON 4 MG: 2 INJECTION INTRAMUSCULAR; INTRAVENOUS at 09:57

## 2019-05-05 RX ADMIN — MORPHINE SULFATE 4 MG: 4 INJECTION INTRAVENOUS at 09:57

## 2019-05-05 RX ADMIN — LIDOCAINE HYDROCHLORIDE 30 ML: 20 SOLUTION OROPHARYNGEAL at 09:56

## 2019-05-05 ASSESSMENT — LIFESTYLE VARIABLES: DO YOU DRINK ALCOHOL: NO

## 2019-05-05 ASSESSMENT — PAIN SCALES - WONG BAKER: WONGBAKER_NUMERICALRESPONSE: HURTS A WHOLE LOT

## 2019-05-05 NOTE — ED PROVIDER NOTES
ED Provider Note    CHIEF COMPLAINT  Chief Complaint   Patient presents with   • Epigastric Pain     x 2.5 months, incrased in severity over last 1 wk   • Hypertension     178/113   • Heartburn   • Shortness of Breath     Shallow breathing due to epigastric pain.        HPI  Tima Rizzo is a 48 y.o. male who presents To the emergency department the chief complaint of abdominal pain.  The patient has had pain on and off for last 2-1/2 months but it has been more severe for the last week or so.  The patient localizes pain to the epigastric region.  He describes it as a ball/knot-like sensation right in his epigastrium.  It is nonradiating.  No specific alleviating or exacerbating factors.  Patient rates the pain as severe.    REVIEW OF SYSTEMS  See HPI for further details. All other systems are negative.     PAST MEDICAL HISTORY  History reviewed. No pertinent past medical history.    FAMILY HISTORY  History reviewed. No pertinent family history.    SOCIAL HISTORY  Social History     Social History   • Marital status: Single     Spouse name: N/A   • Number of children: N/A   • Years of education: N/A     Social History Main Topics   • Smoking status: Current Every Day Smoker     Packs/day: 0.50     Years: 22.00     Types: Cigarettes   • Smokeless tobacco: Never Used   • Alcohol use No      Comment: occ   • Drug use: Yes     Types: Marijuana   • Sexual activity: Not on file     Other Topics Concern   • Not on file     Social History Narrative   • No narrative on file       SURGICAL HISTORY  History reviewed. No pertinent surgical history.    CURRENT MEDICATIONS  Home Medications     Reviewed by Stefani Au (Pharmacy Tech) on 05/05/19 at 1009  Med List Status: Complete   Medication Last Dose Status   Naproxen Sodium (ALEVE) 220 MG Cap > 2 days Active   omeprazole (PRILOSEC) 20 MG delayed-release capsule > 1 week Active                ALLERGIES  No Known Allergies    PHYSICAL EXAM  VITAL SIGNS: BP (!)  "177/116   Pulse 60   Temp 36.3 °C (97.3 °F) (Temporal)   Resp (!) 24   Ht 1.702 m (5' 7\")   Wt 70 kg (154 lb 5.2 oz)   SpO2 98%   BMI 24.17 kg/m²   Constitutional: Well developed, Well nourished, moderate distress, Non-toxic appearance.   HENT: Normocephalic, Atraumatic, Bilateral external ears normal, Oropharynx moist, No oral exudates, Nose normal.   Eyes: PERRL, EOMI, Conjunctiva normal, No discharge.   Neck: Normal range of motion, No tenderness, Supple, No stridor.   Lymphatic: No lymphadenopathy noted.   Cardiovascular: Normal heart rate, Normal rhythm, No murmurs, No rubs, No gallops.   Thorax & Lungs: Normal breath sounds, No respiratory distress, No wheezing, No chest tenderness.   Abdomen: Soft, mild epigastric tenderness to palpation, equivocal Brito sign, no peritoneal signs, active bowel sounds.  No palpable masses.  Skin: Warm, Dry, No erythema, No rash.   Back: No tenderness, No CVA tenderness.   Extremities: Intact distal pulses, No edema, No tenderness, No cyanosis, No clubbing.   Neurologic: Alert & oriented x 3, Normal motor function, Normal sensory function, No focal deficits noted.       RADIOLOGY/PROCEDURES  US-RUQ   Final Result      No abnormalities identified on ultrasound of the right upper quadrant.        Results for orders placed or performed during the hospital encounter of 05/05/19   CBC WITH DIFFERENTIAL   Result Value Ref Range    WBC 9.2 4.8 - 10.8 K/uL    RBC 4.77 4.70 - 6.10 M/uL    Hemoglobin 15.1 14.0 - 18.0 g/dL    Hematocrit 45.0 42.0 - 52.0 %    MCV 94.3 81.4 - 97.8 fL    MCH 31.7 27.0 - 33.0 pg    MCHC 33.6 (L) 33.7 - 35.3 g/dL    RDW 45.6 35.9 - 50.0 fL    Platelet Count 353 164 - 446 K/uL    MPV 9.6 9.0 - 12.9 fL    Neutrophils-Polys 64.80 44.00 - 72.00 %    Lymphocytes 24.70 22.00 - 41.00 %    Monocytes 6.90 0.00 - 13.40 %    Eosinophils 3.00 0.00 - 6.90 %    Basophils 0.40 0.00 - 1.80 %    Immature Granulocytes 0.20 0.00 - 0.90 %    Nucleated RBC 0.00 /100 WBC    " Neutrophils (Absolute) 5.98 1.82 - 7.42 K/uL    Lymphs (Absolute) 2.28 1.00 - 4.80 K/uL    Monos (Absolute) 0.64 0.00 - 0.85 K/uL    Eos (Absolute) 0.28 0.00 - 0.51 K/uL    Baso (Absolute) 0.04 0.00 - 0.12 K/uL    Immature Granulocytes (abs) 0.02 0.00 - 0.11 K/uL    NRBC (Absolute) 0.00 K/uL   COMP METABOLIC PANEL   Result Value Ref Range    Sodium 135 135 - 145 mmol/L    Potassium 3.8 3.6 - 5.5 mmol/L    Chloride 100 96 - 112 mmol/L    Co2 30 20 - 33 mmol/L    Anion Gap 5.0 0.0 - 11.9    Glucose 214 (H) 65 - 99 mg/dL    Bun 16 8 - 22 mg/dL    Creatinine 1.11 0.50 - 1.40 mg/dL    Calcium 9.2 8.4 - 10.2 mg/dL    AST(SGOT) 19 12 - 45 U/L    ALT(SGPT) 18 2 - 50 U/L    Alkaline Phosphatase 56 30 - 99 U/L    Total Bilirubin 0.4 0.1 - 1.5 mg/dL    Albumin 4.1 3.2 - 4.9 g/dL    Total Protein 7.6 6.0 - 8.2 g/dL    Globulin 3.5 1.9 - 3.5 g/dL    A-G Ratio 1.2 g/dL   LIPASE   Result Value Ref Range    Lipase 49 7 - 58 U/L   TROPONIN   Result Value Ref Range    Troponin I <0.02 0.00 - 0.04 ng/mL   ESTIMATED GFR   Result Value Ref Range    GFR If African American >60 >60 mL/min/1.73 m 2    GFR If Non African American >60 >60 mL/min/1.73 m 2   EKG   Result Value Ref Range    Report       [facility].    Test Date:  2019  Pt Name:    JOSELINE SANTIAGO                  Department: [facility]  MRN:        [id number]                      [facility]  Gender:     Male                         Technician: HRR  :        1970                   Requested By:ER TRIAGE PROTOCOL  Order #:    [id number]                    Reading MD: ERICK MIGUEL MD    Measurements  Intervals                                Axis  Rate:       71                           P:          0  HI:         124                          QRS:        42  QRSD:       96                           T:          58  QT:         448  QTc:        487    Interpretive Statements  SINUS RHYTHM  BORDERLINE PROLONGED QT  INTERVAL  ARTIFACT IN LEAD(S) I,III,aVR,aVL,V1,V2,V3,V4,V5,V6  Compared to ECG 11/05/2018 11:58:38  No significant changes    Electronically Signed On 5-5-2019 11:43:14 PDT by ERICK MIGUEL MD           COURSE & MEDICAL DECISION MAKING  Pertinent Labs & Imaging studies reviewed. (See chart for details)    Patient presents today with epigastric abdominal pain.  He has been seen here previously with similar symptoms.  CT scan at that time was unrevealing.  I reviewed the electronic medical record.  Patient had a negative CT scan performed at last visit.  Laboratory studies were unrevealing.    IV is established.  Laboratory studies obtained.  Differential diagnosis includes pink otitis, gastritis, peptic ulcer disease, cholelithiasis, cholecystitis.    After studies remarkable for normal white blood cell count.  Normal troponin.  EKG is unremarkable.  Liver function tests are normal.  Ultrasound of the right upper quadrant is obtained.  No evidence of cholelithiasis.  Otherwise normal.    Patient was treated with a GI cocktail and morphine in the emergency department.  He had good improvement of pain.  On reexamination of the patient at 11:35 AM he says that he is feeling much better.  He says that he thinks the GI cocktail helped.  His abdominal exam remains benign.  We discussed the above laboratory findings.  Remarkably the patient had an elevated glucose.  This likely represents a new diagnosis of diabetes.  The patient will be started on metformin.  The patient does have a primary care provider, Dr. Sen.  He will call to schedule an appointment for management of hypertension and what appears to be diabetes.  In addition the patient will follow up with GI consultants.  I will start the patient on metformin and Prilosec.  Discharged home in improved condition.    The patient will return for new or worsening symptoms and is stable at the time of discharge.    The patient is referred to a primary physician for  blood pressure management, diabetic screening, and for all other preventative health concerns.    DISPOSITION:  Patient will be discharged home in stable condition.    FOLLOW UP:  Wally Sen M.D.  [address]  [address]  [facility] [address]  [phone]    Schedule an appointment as soon as possible for a visit       [facility], Emergency Dept  [address]  [facility] [id number] [phone]    If symptoms worsen    [facility]  [address] [place] [id number] [phone]  Schedule an appointment as soon as possible for a visit   call tomorrow      OUTPATIENT MEDICATIONS:  New Prescriptions    METFORMIN (GLUCOPHAGE) 500 MG TAB    Take 1 Tab by mouth every day.    OMEPRAZOLE (PRILOSEC OTC) 20 MG TABLET    Take 1 Tab by mouth every day.         FINAL IMPRESSION  1. Epigastric abdominal pain    2. Hyperglycemia            Electronically signed by: Eugenio Romero, 5/5/2019 11:24 AM

## 2019-05-05 NOTE — ED NOTES
.Pt D/C to home. VSS. IV removed. D/C instructions and 2 prescriptions sent to pharmacy on file; pt aware to . Pt educated on f/u instructions and medication instructions. Pt verbalizes understanding. Pt leaves ED with no acute changes, complaints or concerns. Pt ambulated out with a steady gait and all belongings.

## 2019-05-05 NOTE — ED NOTES
Assumed pt care. Pt is resting in bed, no signs of distress. Pt denies pain at this time. requesting something to drink.

## 2019-05-05 NOTE — ED TRIAGE NOTES
"Chief Complaint   Patient presents with   • Epigastric Pain     x 2.5 months, incrased in severity over last 1 wk   • Hypertension     178/113   • Heartburn   • Shortness of Breath     Shallow breathing due to epigastric pain.    Reports as \"constricting tearing\" , \"sudden onset when it comes and goes over months\".   Pt pale, hypertensive.   "

## 2019-05-05 NOTE — ED NOTES
Med rec updated and complete  Allergies reviewed  Interviewed pt with mother at bedside with permission from pt.  Pt reports no vitamins.  Pt reports no antibiotics in the last 30 days.

## 2019-05-13 ENCOUNTER — APPOINTMENT (OUTPATIENT)
Dept: RADIOLOGY | Facility: MEDICAL CENTER | Age: 49
End: 2019-05-13
Attending: INTERNAL MEDICINE
Payer: MEDICARE

## 2019-05-16 ENCOUNTER — HOSPITAL ENCOUNTER (OUTPATIENT)
Dept: RADIOLOGY | Facility: MEDICAL CENTER | Age: 49
End: 2019-05-16
Attending: INTERNAL MEDICINE
Payer: MEDICARE

## 2019-05-16 DIAGNOSIS — C16.1 MALIGNANT NEOPLASM OF FUNDUS OF STOMACH (HCC): ICD-10-CM

## 2019-05-16 DIAGNOSIS — R10.13 EPIGASTRIC ABDOMINAL PAIN: ICD-10-CM

## 2019-05-16 DIAGNOSIS — R13.19 OTHER DYSPHAGIA: ICD-10-CM

## 2019-05-16 PROCEDURE — 700117 HCHG RX CONTRAST REV CODE 255: Performed by: INTERNAL MEDICINE

## 2019-05-16 PROCEDURE — 71260 CT THORAX DX C+: CPT

## 2019-05-16 RX ADMIN — IOHEXOL 50 ML: 240 INJECTION, SOLUTION INTRATHECAL; INTRAVASCULAR; INTRAVENOUS; ORAL at 16:34

## 2019-05-16 RX ADMIN — IOHEXOL 100 ML: 350 INJECTION, SOLUTION INTRAVENOUS at 16:34

## 2019-05-31 ENCOUNTER — PATIENT OUTREACH (OUTPATIENT)
Dept: OTHER | Facility: MEDICAL CENTER | Age: 49
End: 2019-05-31

## 2019-06-03 NOTE — PROGRESS NOTES
Subjective:   6/4/2019  8:51 AM  Primary care physician:Jenae Steward M.D.  Referring Provider: Lynnette Marie MD  Medical Oncologist: Olimpia Keyes MD     Chief Complaint:   Chief Complaint   Patient presents with   • New Patient     NP GASTRIC CA REF DR MARIE     Diagnosis:   1. 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 Abdominal pain, epigastric     3. Loss of weight     4. Dysphagia, unspecified type         History of presenting illness:  Tima Rizzo  is a pleasant 48 y.o. 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  The patient has some mild dysphasia.  His CT scan which was done in May reveals a thickening of the stomach extending close to the GE junction.  This would be considered a T4 lesion.  The patient is 48 years old but has not been tested for CDH 1.  He also has no recollection of his father's family history but he states they were all in poor health.  The patient states that he has had no upper or lower GI bleeds but is losing weight due to inability to eat more than 3-4 bites each time.  He does have a long history of spinal trauma which he has been taking Aleve on a regular basis.  He was diagnosed with a stomach ulcer was young as well.  In any event, he is here with his mom to discuss the next steps in his care.  He has not had a PET scan.  The CT scan from May [address] reveals no sign of metastatic disease to the liver or any obvious adenopathy.  He can see the thickness of the tumor.  He does have an endoscopic ultrasound set up for later this week.  He does not have a PET scan.  The patient also does not have a HER-2/lyle study.    No past medical history on file.  No past surgical history on file.  No Known Allergies  Outpatient Encounter Prescriptions as of 6/4/2019   Medication Sig Dispense Refill   • lisinopril (PRINIVIL) 10 MG Tab Take 10 mg by mouth every day.     • oxyCODONE CR  (OXYCONTIN) 10 MG Tablet Extended Release 12 hour Abuse-Deterrent Take 10 mg by mouth every 12 hours.     • docusate sodium (COLACE) 100 MG Cap Take 100 mg by mouth 2 times a day.     • omeprazole (PRILOSEC OTC) 20 MG tablet Take 1 Tab by mouth every day. 30 Tab 0   • metFORMIN (GLUCOPHAGE) 500 MG Tab Take 1 Tab by mouth every day. 30 Tab 0   • omeprazole (PRILOSEC) 20 MG delayed-release capsule Take 20 mg by mouth as needed.     • Naproxen Sodium (ALEVE) 220 MG Cap Take 220 Caps by mouth as needed.       No facility-administered encounter medications on file as of 6/4/2019.      Social History     Social History   • Marital status: Single     Spouse name: N/A   • Number of children: N/A   • Years of education: N/A     Occupational History   • Not on file.     Social History Main Topics   • Smoking status: Current Every Day Smoker     Packs/day: 0.50     Years: 22.00     Types: Cigarettes   • Smokeless tobacco: Never Used   • Alcohol use No      Comment: occ   • Drug use: Yes     Types: Marijuana   • Sexual activity: Not on file     Other Topics Concern   • Not on file     Social History Narrative   • No narrative on file      History   Smoking Status   • Current Every Day Smoker   • Packs/day: 0.50   • Years: 22.00   • Types: Cigarettes   Smokeless Tobacco   • Never Used     History   Alcohol Use No     Comment: occ     History   Drug Use   • Types: Marijuana        No family history on file.  No pertinent family history on file    Review of Systems   Constitutional: Positive for weight loss (18lbs).   HENT: Positive for sinus pain.    Respiratory: Positive for shortness of breath.    Gastrointestinal: Positive for abdominal pain.        Change in appetite   Genitourinary: Positive for frequency.   Endo/Heme/Allergies: Positive for environmental allergies.   Psychiatric/Behavioral: Positive for depression.        Sleep disturbances  Restless sleep   All other systems reviewed and are negative.       Objective:   BP  "122/64 (BP Location: Right arm, Patient Position: Sitting, BP Cuff Size: Adult)   Pulse 85   Temp 36.6 °C (97.9 °F) (Temporal)   Ht 1.702 m (5' 7\")   Wt 68.9 kg (151 lb 14.4 oz)   SpO2 100%   BMI 23.79 kg/m²     Physical Exam   Constitutional: He is oriented to person, place, and time. He appears well-developed and well-nourished.   HENT:   Head: Normocephalic and atraumatic.   Eyes: Pupils are equal, round, and reactive to light. Conjunctivae are normal.   Neck: Normal range of motion. Neck supple.   Cardiovascular: Normal rate and regular rhythm.    Pulmonary/Chest: Effort normal and breath sounds normal.   Abdominal: Soft. Bowel sounds are normal. There is tenderness.   Epigastric pain in the region of his tumor   Musculoskeletal: Normal range of motion.   Neurological: He is alert and oriented to person, place, and time.   Skin: Skin is warm and dry.   Nursing note and vitals reviewed.      Labs:  Results for JOSELINE SANTIAGO (MRN [id number]) as of 6/3/2019 13:33   Ref. Range 5/5/2019 09:52   WBC Latest Ref Range: 4.8 - 10.8 K/uL 9.2   RBC Latest Ref Range: 4.70 - 6.10 M/uL 4.77   Hemoglobin Latest Ref Range: 14.0 - 18.0 g/dL 15.1   Hematocrit Latest Ref Range: 42.0 - 52.0 % 45.0   MCV Latest Ref Range: 81.4 - 97.8 fL 94.3   MCH Latest Ref Range: 27.0 - 33.0 pg 31.7   MCHC Latest Ref Range: 33.7 - 35.3 g/dL 33.6 (L)   RDW Latest Ref Range: 35.9 - 50.0 fL 45.6   Platelet Count Latest Ref Range: 164 - 446 K/uL 353   MPV Latest Ref Range: 9.0 - 12.9 fL 9.6   Neutrophils-Polys Latest Ref Range: 44.00 - 72.00 % 64.80   Neutrophils (Absolute) Latest Ref Range: 1.82 - 7.42 K/uL 5.98   Lymphocytes Latest Ref Range: 22.00 - 41.00 % 24.70   Lymphs (Absolute) Latest Ref Range: 1.00 - 4.80 K/uL 2.28   Monocytes Latest Ref Range: 0.00 - 13.40 % 6.90   Monos (Absolute) Latest Ref Range: 0.00 - 0.85 K/uL 0.64   Eosinophils Latest Ref Range: 0.00 - 6.90 % 3.00   Eos (Absolute) Latest Ref Range: 0.00 - 0.51 K/uL 0.28   "   Basophils Latest Ref Range: 0.00 - 1.80 % 0.40   Baso (Absolute) Latest Ref Range: 0.00 - 0.12 K/uL 0.04   Immature Granulocytes Latest Ref Range: 0.00 - 0.90 % 0.20   Immature Granulocytes (abs) Latest Ref Range: 0.00 - 0.11 K/uL 0.02   Nucleated RBC Latest Units: /100 WBC 0.00   NRBC (Absolute) Latest Units: K/uL 0.00   Sodium Latest Ref Range: 135 - 145 mmol/L 135   Potassium Latest Ref Range: 3.6 - 5.5 mmol/L 3.8   Chloride Latest Ref Range: 96 - 112 mmol/L 100   Co2 Latest Ref Range: 20 - 33 mmol/L 30   Anion Gap Latest Ref Range: 0.0 - 11.9  5.0   Glucose Latest Ref Range: 65 - 99 mg/dL 214 (H)   Bun Latest Ref Range: 8 - 22 mg/dL 16   Creatinine Latest Ref Range: 0.50 - 1.40 mg/dL 1.11   GFR If  Latest Ref Range: >60 mL/min/1.73 m 2 >60   GFR If Non  Latest Ref Range: >60 mL/min/1.73 m 2 >60   Calcium Latest Ref Range: 8.4 - 10.2 mg/dL 9.2   AST(SGOT) Latest Ref Range: 12 - 45 U/L 19   ALT(SGPT) Latest Ref Range: 2 - 50 U/L 18   Alkaline Phosphatase Latest Ref Range: 30 - 99 U/L 56   Total Bilirubin Latest Ref Range: 0.1 - 1.5 mg/dL 0.4   Albumin Latest Ref Range: 3.2 - 4.9 g/dL 4.1   Total Protein Latest Ref Range: 6.0 - 8.2 g/dL 7.6   Globulin Latest Ref Range: 1.9 - 3.5 g/dL 3.5   A-G Ratio Latest Units: g/dL 1.2   Lipase Latest Ref Range: 7 - 58 U/L 49   Troponin I Latest Ref Range: 0.00 - 0.04 ng/mL <0.02       Imaging:  Per my read,    Impression 5/16/19 CT       Mild diffuse bladder wall thickening is nonspecific, may just indicate incomplete distention although cystitis or chronic mild outlet obstruction are possible    No acute thoracic or abdominal abnormality is detected    3 mm right lower lobe noncalcified pulmonary nodules.    Low Risk: No routine follow-up    High Risk: Optional CT at 12 months    Comments: Use most suspicious nodule as guide to management. Follow-up intervals may vary according to size and risk.    Low Risk - Minimal or absent history of  smoking and of other known risk factors.    High Risk - History of smoking or of other known risk factors.         Pathology: 5/8/19        Procedures: 5/18/19         Assessment:     1. 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 Abdominal pain, epigastric    3. Loss of weight    4. 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  Based on the imaging it does appear to be full-thickness and would be considered a T4 thus I am recommending neoadjuvant chemotherapy.  I spoken to his medical oncologist Dr. Keyes who will get the patient in ASAP.  The patient will need a PowerPort and we will arrange for the PowerPort sometime next week.  We discussed the risks and benefits of the PowerPort including bleeding, infection, not being able to feed the catheter, pneumothorax of 1%, and that we would do a cephalic cutdown.  I am also recommending the patient see medical genetics due to his being young and the patient will need to CDH 1 genetic test to rule out gastric cancer caused by CDH 1.  The patient does have children and siblings.  I am also recommending a PET scan which we will order.  Dr. Keyes will call the patient later today.  The patient has not had a HER-2/lyle study of the tumor to see if he is a candidate for Herceptin.      He agreed and verbalized his agreement and understanding with the current plan. I answered all questions and concerns he has at this time and advised him to call at any time in there interim with questions or concerns in regards to his care.    Thank you for allowing me to participate in his care, I will continue to follow closely.         Please note that this dictation was created using voice recognition software. I have made every reasonable attempt to correct obvious errors, but I expect that there are errors of grammar and  possibly content that I did not discover before finalizing the note.     Thank you for this consultation and allowing me to participate in your patient's care. If I can be of further service please contact my office.

## 2019-06-04 ENCOUNTER — OFFICE VISIT (OUTPATIENT)
Dept: SURGERY | Facility: MEDICAL CENTER | Age: 49
End: 2019-06-04
Payer: MEDICARE

## 2019-06-04 ENCOUNTER — TELEPHONE (OUTPATIENT)
Dept: HEMATOLOGY ONCOLOGY | Facility: MEDICAL CENTER | Age: 49
End: 2019-06-04

## 2019-06-04 VITALS
HEIGHT: 67 IN | OXYGEN SATURATION: 100 % | BODY MASS INDEX: 23.84 KG/M2 | TEMPERATURE: 97.9 F | SYSTOLIC BLOOD PRESSURE: 122 MMHG | WEIGHT: 151.9 LBS | HEART RATE: 85 BPM | DIASTOLIC BLOOD PRESSURE: 64 MMHG

## 2019-06-04 DIAGNOSIS — C16.1 MALIGNANT NEOPLASM OF FUNDUS OF STOMACH (HCC): ICD-10-CM

## 2019-06-04 DIAGNOSIS — R10.13 ABDOMINAL PAIN, EPIGASTRIC: ICD-10-CM

## 2019-06-04 DIAGNOSIS — R13.10 DYSPHAGIA, UNSPECIFIED TYPE: ICD-10-CM

## 2019-06-04 DIAGNOSIS — R63.4 LOSS OF WEIGHT: ICD-10-CM

## 2019-06-04 PROCEDURE — 99205 OFFICE O/P NEW HI 60 MIN: CPT | Performed by: SURGERY

## 2019-06-04 RX ORDER — LISINOPRIL 10 MG/1
10 TABLET ORAL DAILY
COMMUNITY
End: 2019-10-12

## 2019-06-04 RX ORDER — OXYCODONE HCL 10 MG/1
10 TABLET, FILM COATED, EXTENDED RELEASE ORAL EVERY 6 HOURS PRN
Status: ON HOLD | COMMUNITY
End: 2019-06-13

## 2019-06-04 ASSESSMENT — ENCOUNTER SYMPTOMS
ABDOMINAL PAIN: 1
DEPRESSION: 1
WEIGHT LOSS: 1
SHORTNESS OF BREATH: 1
ROS GI COMMENTS: CHANGE IN APPETITE
SINUS PAIN: 1

## 2019-06-04 NOTE — TELEPHONE ENCOUNTER
1st attempt to contact the patient- Left voicemail for patient requesting a return call to schedule New Oncology Genetic appointment. (Remind patient to bring a list/ or questionnaire, of her cancer related family history)  NP/Erin/Malignant neoplasm of fundus of stomach/Dr Mckeon

## 2019-06-04 NOTE — PATIENT INSTRUCTIONS
The patient will be referred to Dr. Ovalles for medical genetics to test for CDH 1.  We also put in a PowerPort for neoadjuvant chemotherapy

## 2019-06-10 ENCOUNTER — HOSPITAL ENCOUNTER (OUTPATIENT)
Dept: LAB | Facility: MEDICAL CENTER | Age: 49
End: 2019-06-10
Attending: FAMILY MEDICINE
Payer: MEDICARE

## 2019-06-10 DIAGNOSIS — Z01.812 PRE-OPERATIVE LABORATORY EXAMINATION: ICD-10-CM

## 2019-06-10 LAB
ERYTHROCYTE [DISTWIDTH] IN BLOOD BY AUTOMATED COUNT: 45.1 FL (ref 35.9–50)
HCT VFR BLD AUTO: 42.4 % (ref 42–52)
HGB BLD-MCNC: 13.9 G/DL (ref 14–18)
MCH RBC QN AUTO: 31.4 PG (ref 27–33)
MCHC RBC AUTO-ENTMCNC: 32.8 G/DL (ref 33.7–35.3)
MCV RBC AUTO: 95.7 FL (ref 81.4–97.8)
PLATELET # BLD AUTO: 290 K/UL (ref 164–446)
PMV BLD AUTO: 10.6 FL (ref 9–12.9)
RBC # BLD AUTO: 4.43 M/UL (ref 4.7–6.1)
WBC # BLD AUTO: 10 K/UL (ref 4.8–10.8)

## 2019-06-10 PROCEDURE — 86480 TB TEST CELL IMMUN MEASURE: CPT

## 2019-06-10 PROCEDURE — 36415 COLL VENOUS BLD VENIPUNCTURE: CPT

## 2019-06-10 PROCEDURE — 85027 COMPLETE CBC AUTOMATED: CPT

## 2019-06-10 PROCEDURE — 80048 BASIC METABOLIC PNL TOTAL CA: CPT

## 2019-06-10 RX ORDER — ACETAMINOPHEN 500 MG
500-1000 TABLET ORAL EVERY 6 HOURS PRN
Status: ON HOLD | COMMUNITY
End: 2019-06-13

## 2019-06-10 NOTE — OR NURSING
"Preadmit appointment: \" Preparing for your Procedure information\" sheet given to patient with verbal and written instructions. Patient instructed to continue prescribed medications through the day before surgery, instructed to take the following medications the day of surgery per anesthesia protocol:  Tylenol, Omeprazole, Oxycontin PRN, Anesthesia:  States he is slow to wake up.    "

## 2019-06-11 LAB
ANION GAP SERPL CALC-SCNC: 9 MMOL/L (ref 0–11.9)
BUN SERPL-MCNC: 15 MG/DL (ref 8–22)
CALCIUM SERPL-MCNC: 9.3 MG/DL (ref 8.5–10.5)
CHLORIDE SERPL-SCNC: 103 MMOL/L (ref 96–112)
CO2 SERPL-SCNC: 24 MMOL/L (ref 20–33)
CREAT SERPL-MCNC: 1.09 MG/DL (ref 0.5–1.4)
GLUCOSE SERPL-MCNC: 93 MG/DL (ref 65–99)
POTASSIUM SERPL-SCNC: 4.1 MMOL/L (ref 3.6–5.5)
SODIUM SERPL-SCNC: 136 MMOL/L (ref 135–145)

## 2019-06-12 ENCOUNTER — ANESTHESIA EVENT (OUTPATIENT)
Dept: SURGERY | Facility: MEDICAL CENTER | Age: 49
End: 2019-06-12
Payer: MEDICARE

## 2019-06-12 ENCOUNTER — HOSPITAL ENCOUNTER (OUTPATIENT)
Facility: MEDICAL CENTER | Age: 49
End: 2019-06-12
Attending: INTERNAL MEDICINE | Admitting: INTERNAL MEDICINE
Payer: MEDICARE

## 2019-06-12 ENCOUNTER — ANESTHESIA (OUTPATIENT)
Dept: SURGERY | Facility: MEDICAL CENTER | Age: 49
End: 2019-06-12
Payer: MEDICARE

## 2019-06-12 VITALS
WEIGHT: 153 LBS | SYSTOLIC BLOOD PRESSURE: 153 MMHG | DIASTOLIC BLOOD PRESSURE: 98 MMHG | HEART RATE: 75 BPM | TEMPERATURE: 97.3 F | HEIGHT: 67 IN | BODY MASS INDEX: 24.01 KG/M2 | RESPIRATION RATE: 18 BRPM | OXYGEN SATURATION: 100 %

## 2019-06-12 PROBLEM — C16.9 GASTRIC CANCER (HCC): Status: ACTIVE | Noted: 2019-06-12

## 2019-06-12 PROBLEM — I10 HTN (HYPERTENSION): Status: ACTIVE | Noted: 2019-06-12

## 2019-06-12 PROBLEM — T88.59XA DELAYED EMERGENCE FROM ANESTHESIA: Status: ACTIVE | Noted: 2019-06-12

## 2019-06-12 PROBLEM — F99 PSYCHIATRIC PROBLEM: Status: ACTIVE | Noted: 2019-06-12

## 2019-06-12 PROBLEM — F12.90 MARIJUANA USE: Status: ACTIVE | Noted: 2019-06-12

## 2019-06-12 PROBLEM — Z72.0 TOBACCO ABUSE: Status: ACTIVE | Noted: 2019-06-12

## 2019-06-12 LAB
GLUCOSE BLD-MCNC: 103 MG/DL (ref 65–99)
PATHOLOGY CONSULT NOTE: NORMAL

## 2019-06-12 PROCEDURE — 700101 HCHG RX REV CODE 250: Performed by: ANESTHESIOLOGY

## 2019-06-12 PROCEDURE — 160035 HCHG PACU - 1ST 60 MINS PHASE I: Performed by: INTERNAL MEDICINE

## 2019-06-12 PROCEDURE — 88305 TISSUE EXAM BY PATHOLOGIST: CPT

## 2019-06-12 PROCEDURE — 160009 HCHG ANES TIME/MIN: Performed by: INTERNAL MEDICINE

## 2019-06-12 PROCEDURE — A9270 NON-COVERED ITEM OR SERVICE: HCPCS

## 2019-06-12 PROCEDURE — 700102 HCHG RX REV CODE 250 W/ 637 OVERRIDE(OP)

## 2019-06-12 PROCEDURE — 160046 HCHG PACU - 1ST 60 MINS PHASE II: Performed by: INTERNAL MEDICINE

## 2019-06-12 PROCEDURE — 160002 HCHG RECOVERY MINUTES (STAT): Performed by: INTERNAL MEDICINE

## 2019-06-12 PROCEDURE — 160048 HCHG OR STATISTICAL LEVEL 1-5: Performed by: INTERNAL MEDICINE

## 2019-06-12 PROCEDURE — 82962 GLUCOSE BLOOD TEST: CPT

## 2019-06-12 PROCEDURE — 700101 HCHG RX REV CODE 250

## 2019-06-12 PROCEDURE — 700105 HCHG RX REV CODE 258: Performed by: INTERNAL MEDICINE

## 2019-06-12 PROCEDURE — 500066 HCHG BITE BLOCK, ECT: Performed by: INTERNAL MEDICINE

## 2019-06-12 PROCEDURE — 700111 HCHG RX REV CODE 636 W/ 250 OVERRIDE (IP): Performed by: ANESTHESIOLOGY

## 2019-06-12 PROCEDURE — 160203 HCHG ENDO MINUTES - 1ST 30 MINS LEVEL 4: Performed by: INTERNAL MEDICINE

## 2019-06-12 PROCEDURE — 160025 RECOVERY II MINUTES (STATS): Performed by: INTERNAL MEDICINE

## 2019-06-12 RX ORDER — HALOPERIDOL 5 MG/ML
1 INJECTION INTRAMUSCULAR
Status: DISCONTINUED | OUTPATIENT
Start: 2019-06-12 | End: 2019-06-12 | Stop reason: HOSPADM

## 2019-06-12 RX ORDER — SODIUM CHLORIDE, SODIUM LACTATE, POTASSIUM CHLORIDE, CALCIUM CHLORIDE 600; 310; 30; 20 MG/100ML; MG/100ML; MG/100ML; MG/100ML
INJECTION, SOLUTION INTRAVENOUS CONTINUOUS
Status: DISCONTINUED | OUTPATIENT
Start: 2019-06-12 | End: 2019-06-12 | Stop reason: HOSPADM

## 2019-06-12 RX ORDER — MEPERIDINE HYDROCHLORIDE 25 MG/ML
12.5 INJECTION INTRAMUSCULAR; INTRAVENOUS; SUBCUTANEOUS
Status: DISCONTINUED | OUTPATIENT
Start: 2019-06-12 | End: 2019-06-12 | Stop reason: HOSPADM

## 2019-06-12 RX ORDER — CHLORHEXIDINE GLUCONATE ORAL RINSE 1.2 MG/ML
SOLUTION DENTAL
Status: COMPLETED
Start: 2019-06-12 | End: 2019-06-12

## 2019-06-12 RX ORDER — CHLORHEXIDINE GLUCONATE ORAL RINSE 1.2 MG/ML
15 SOLUTION DENTAL 2 TIMES DAILY
Status: DISCONTINUED | OUTPATIENT
Start: 2019-06-12 | End: 2019-06-12 | Stop reason: HOSPADM

## 2019-06-12 RX ORDER — DIPHENHYDRAMINE HYDROCHLORIDE 50 MG/ML
12.5 INJECTION INTRAMUSCULAR; INTRAVENOUS
Status: DISCONTINUED | OUTPATIENT
Start: 2019-06-12 | End: 2019-06-12 | Stop reason: HOSPADM

## 2019-06-12 RX ORDER — LIDOCAINE HYDROCHLORIDE 10 MG/ML
INJECTION, SOLUTION INFILTRATION; PERINEURAL
Status: COMPLETED
Start: 2019-06-12 | End: 2019-06-12

## 2019-06-12 RX ORDER — ONDANSETRON 2 MG/ML
4 INJECTION INTRAMUSCULAR; INTRAVENOUS
Status: DISCONTINUED | OUTPATIENT
Start: 2019-06-12 | End: 2019-06-12 | Stop reason: HOSPADM

## 2019-06-12 RX ORDER — OXYCODONE HCL 5 MG/5 ML
10 SOLUTION, ORAL ORAL
Status: DISCONTINUED | OUTPATIENT
Start: 2019-06-12 | End: 2019-06-12 | Stop reason: HOSPADM

## 2019-06-12 RX ORDER — OXYCODONE HCL 5 MG/5 ML
5 SOLUTION, ORAL ORAL
Status: DISCONTINUED | OUTPATIENT
Start: 2019-06-12 | End: 2019-06-12 | Stop reason: HOSPADM

## 2019-06-12 RX ADMIN — PROPOFOL 150 MCG/KG/MIN: 10 INJECTION, EMULSION INTRAVENOUS at 08:04

## 2019-06-12 RX ADMIN — Medication 0.5 ML: at 07:25

## 2019-06-12 RX ADMIN — PROPOFOL 30 MG: 10 INJECTION, EMULSION INTRAVENOUS at 08:19

## 2019-06-12 RX ADMIN — CHLORHEXIDINE GLUCONATE 15 ML: 1.2 RINSE ORAL at 07:29

## 2019-06-12 RX ADMIN — SODIUM CHLORIDE, POTASSIUM CHLORIDE, SODIUM LACTATE AND CALCIUM CHLORIDE: 600; 310; 30; 20 INJECTION, SOLUTION INTRAVENOUS at 08:00

## 2019-06-12 RX ADMIN — CHLORHEXIDINE GLUCONATE ORAL RINSE 15 ML: 1.2 SOLUTION DENTAL at 07:29

## 2019-06-12 RX ADMIN — PROPOFOL 100 MG: 10 INJECTION, EMULSION INTRAVENOUS at 08:07

## 2019-06-12 RX ADMIN — LIDOCAINE HYDROCHLORIDE 0.5 ML: 10 INJECTION, SOLUTION INFILTRATION; PERINEURAL at 07:25

## 2019-06-12 RX ADMIN — SODIUM CHLORIDE, POTASSIUM CHLORIDE, SODIUM LACTATE AND CALCIUM CHLORIDE: 600; 310; 30; 20 INJECTION, SOLUTION INTRAVENOUS at 07:26

## 2019-06-12 RX ADMIN — FENTANYL CITRATE 50 MCG: 50 INJECTION, SOLUTION INTRAMUSCULAR; INTRAVENOUS at 08:00

## 2019-06-12 ASSESSMENT — PAIN SCALES - GENERAL: PAIN_LEVEL: 0

## 2019-06-12 NOTE — ANESTHESIA PREPROCEDURE EVALUATION
Relevant Problems   No relevant active problems       Physical Exam    Airway   Mallampati: II  TM distance: >3 FB  Neck ROM: full       Cardiovascular - normal exam  Rhythm: regular  Rate: normal  (-) murmur     Dental - normal exam         Pulmonary - normal exam  Breath sounds clear to auscultation     Abdominal    Neurological - normal exam                 Anesthesia Plan    ASA 3       Plan - general       Airway plan will be natural airway        Induction: intravenous          Informed Consent:    Anesthetic plan and risks discussed with patient.    Use of blood products discussed with: patient whom.

## 2019-06-12 NOTE — ANESTHESIA TIME REPORT
Anesthesia Start and Stop Event Times     Date Time Event    6/12/2019 0800 Anesthesia Start     0825 Anesthesia Stop        Responsible Staff  06/12/19    Name Role Begin End    Diego Kearney M.D. Anesth 0800 0825        Preop Diagnosis (Free Text):  Pre-op Diagnosis     MALIGNANT NEOPLASM OF FUNDUS OF STOMACH        Preop Diagnosis (Codes):  Diagnosis Information     Diagnosis Code(s):         Post op Diagnosis  Stomach neoplasm      Premium Reason  Non-Premium    Comments:                                                                EGD, EUS and biopsy

## 2019-06-12 NOTE — DISCHARGE INSTRUCTIONS
ENDOSCOPY HOME CARE INSTRUCTIONS    GASTROSCOPY OR ERCP  1. Don't eat or drink anything for about an hour after the test. You can then resume your regular diet.  2. Don't drive or drink alcohol for 24 hours. The medication you received will make you too drowsy.  3. Don't take any coffee, tea, or aspirin products until after you see your doctor. These can harm the lining of your stomach.  4. If you begin to vomit bloody material, or develop black or bloody stools, call your doctor as soon as possible.  5. If you have any neck, chest, abdominal pain or temp of 100 degrees, call your doctor.  6. See your doctor Follow up with Dr Betancourt and Dr Keyes  7. Additional instructions: None  8. Prescriptions: none    Impression:  1. Gastric cancer (cardia to proximal body, greater curvature), T4b N3a Mx  2. Distal esophageal whitish plaques, biopsied.     Recommendations:    Follow-up with established GI Dr. Opal Betancourt   Follow-up with oncologist Dr. Keyes for chemotherapy.      You should call 911 if you develop problems with breathing or chest pain.  If any questions arise, call your doctor. If your doctor is not available, please feel free to call [phone]. You can also call the HEALTH HOTLINE open 24 hours/day, 7 days/week and speak to a nurse at [phone], or toll free [phone].    Depression / Suicide Risk    As you are discharged from this [facility] facility, it is important to learn how to keep safe from harming yourself.    Recognize the warning signs:  · Abrupt changes in personality, positive or negative- including increase in energy   · Giving away possessions  · Change in eating patterns- significant weight changes-  positive or negative  · Change in sleeping patterns- unable to sleep or sleeping all the time   · Unwillingness or inability to communicate  · Depression  · Unusual sadness, discouragement and loneliness  · Talk of wanting to die  · Neglect of personal  appearance   · Rebelliousness- reckless behavior  · Withdrawal from people/activities they love  · Confusion- inability to concentrate     If you or a loved one observes any of these behaviors or has concerns about self-harm, here's what you can do:  · Talk about it- your feelings and reasons for harming yourself  · Remove any means that you might use to hurt yourself (examples: pills, rope, extension cords, firearm)  · Get professional help from the community (Mental Health, Substance Abuse, psychological counseling)  · Do not be alone:Call your Safe Contact- someone whom you trust who will be there for you.  · Call your local CRISIS HOTLINE 627-8343 or 664-109-4616  · Call your local Children's Mobile Crisis Response Team [place] (147) 262-1517 or www.[employer]  · Call the toll free National Suicide Prevention Hotlines   · National Suicide Prevention Lifeline 031-994-WRVH (2861)  · National Hope Line Network 800-SUICIDE (764-1038)    I acknowledge receipt and understanding of these Home Care Instructions.

## 2019-06-12 NOTE — ANESTHESIA QCDR
2019 [facility] Clinical Data Registry (for Quality Improvement)     Postoperative nausea/vomiting risk protocol (Adult = 18 yrs and Pediatric 3-17 yrs)- (430 and 463)  General inhalation anesthetic (NOT TIVA) with PONV risk factors: Yes  Provision of anti-emetic therapy with at least 2 different classes of agents: Yes   Patient DID NOT receive anti-emetic therapy and reason is documented in Medical Record:  N/A    Multimodal Pain Management- (AQI59)  Patient undergoing Elective Surgery (i.e. Outpatient, or ASC, or Prescheduled Surgery prior to Hospital Admission): Yes  Use of Multimodal Pain Management, two or more drugs and/or interventions, NOT including systemic opioids: Yes   Exception: Documented allergy to multiple classes of analgesics:  N/A    PACU assessment of acute postoperative pain prior to Anesthesia Care End- Applies to Patients Age = 18- (ABG7)  Initial PACU pain score is which of the following: < 7/10  Patient unable to report pain score: N/A    Post-anesthetic transfer of care checklist/protocol to PACU/ICU- (426 and 427)  Upon conclusion of case, patient transferred to which of the following locations: PACU/Non-ICU  Use of transfer checklist/protocol: Yes  Exclusion: Service Performed in Patient Hospital Room (and thus did not require transfer): N/A    PACU Reintubation- (AQI31)  General anesthesia requiring endotracheal intubation (ETT) along with subsequent extubation in OR or PACU: No  Required reintubation in the PACU: N/A  Extubation was a planned trial documented in the medical record prior to removal of the original airway device: N/A    Unplanned admission to ICU related to anesthesia service up through end of PACU care- (MD51)  Unplanned admission to ICU (not initially anticipated at anesthesia start time): No

## 2019-06-12 NOTE — OR NURSING
0825- Pt to pacu via gurney with side rails up.  VSS.  Pt unresponsive to voice.  Abd soft, rounded.  BS Qx4. Respirations spontaneous and unlabored.  0840- VSS. Pt awake, denies pain/ nausea.   0850- Dr Claudio at bedside talking to pt.  0859- Report to Anibal BENITEZ, Pt meets stage 2 criteria.

## 2019-06-12 NOTE — PROGRESS NOTES
Patient seen and examined, I reviewed Dr. Marie's note in detail and performed by own H+P.  Risks, benefits, and alternatives of EGD/EUS possible FNA for staging of gastric cancer were discussed with patient and mother (Monica).  Consenting persons were given opportunities to ask questions and discuss other options.  Risks including but not limited to inaccurate staging, malignant seeding, perforation, infection, bleeding, missed lesion(s), indefinite diagnosis, cardiac and/or pulmonary event, aspiration, hypoxia, stroke, medication and/or anesthesia reaction, possible need for surgery and/or interventional radiology, hospitalization possibly prolonged, discomfort, unsuccessful and/or incomplete procedure, indefinite diagnosis, ineffective therapy and/or persistent symptoms, possible need for repeat procedures and/or additional testings, damage to adjacent organs and/or vascular structures, medication reaction, and other adverse events possibly life-threatening.  Interactive discussion was undertaken with Layman's terms.  Consenting persons stated understanding and acceptance of these risks, and wished to proceed.  Informed consent was given in clear state of mind.

## 2019-06-12 NOTE — ANESTHESIA POSTPROCEDURE EVALUATION
Patient: Tima Rizzo    Procedure Summary     Date:  06/12/19 Room / Location:   ENDOSCOPIC ULTRASOUND ROOM / SURGERY [facility]    Anesthesia Start:  0800 Anesthesia Stop:  0825    Procedures:       GASTROSCOPY      GASTROSCOPY, WITH BIOPSY - POSSIBLE      GASTROSCOPY, WITH BALLOON DILATION      EGD, WITH ENDOSCOPIC US - UPPER, RADIAL      EGD, WITH ASPIRATION BIOPSY - POSSIBLE Diagnosis:  (MALIGNANT NEOPLASM OF FUNDUS OF STOMACH)    Surgeon:  Bjorn Claudio M.D. Responsible Provider:  Diego Kearney M.D.    Anesthesia Type:  general ASA Status:  3          Final Anesthesia Type: general  Last vitals  BP   Blood Pressure: 144/97    Temp   36.7 °C (98.1 °F)    Pulse   Heart Rate (Monitored): 73   Resp   16    SpO2   97 %      Anesthesia Post Evaluation    Patient location during evaluation: bedside  Patient participation: complete - patient participated  Level of consciousness: awake and alert  Pain score: 0    Airway patency: patent  Anesthetic complications: no  Cardiovascular status: adequate and hemodynamically stable  Respiratory status: acceptable  Hydration status: acceptable    PONV: none           Nurse Pain Score: 6 (NPRS)

## 2019-06-12 NOTE — OR NURSING
0634: Brought patient back to pre-op and assumed care.  0734: Patient allergies and NPO status verified, home medication reconciliation completed and belongings secured. Patient verbalizes understanding of pain scale, expected course of stay and plan of care. Surgical site verified with patient. IV access established.

## 2019-06-13 ENCOUNTER — ANESTHESIA (OUTPATIENT)
Dept: SURGERY | Facility: MEDICAL CENTER | Age: 49
End: 2019-06-13
Payer: MEDICARE

## 2019-06-13 ENCOUNTER — HOSPITAL ENCOUNTER (OUTPATIENT)
Facility: MEDICAL CENTER | Age: 49
End: 2019-06-13
Attending: SURGERY | Admitting: SURGERY
Payer: MEDICARE

## 2019-06-13 ENCOUNTER — APPOINTMENT (OUTPATIENT)
Dept: RADIOLOGY | Facility: MEDICAL CENTER | Age: 49
End: 2019-06-13
Attending: SURGERY
Payer: MEDICARE

## 2019-06-13 VITALS
RESPIRATION RATE: 15 BRPM | SYSTOLIC BLOOD PRESSURE: 144 MMHG | HEIGHT: 67 IN | BODY MASS INDEX: 23.15 KG/M2 | OXYGEN SATURATION: 97 % | DIASTOLIC BLOOD PRESSURE: 111 MMHG | HEART RATE: 73 BPM | WEIGHT: 147.49 LBS | TEMPERATURE: 97.4 F

## 2019-06-13 DIAGNOSIS — C16.8 MALIGNANT NEOPLASM OF OVERLAPPING SITES OF STOMACH (HCC): ICD-10-CM

## 2019-06-13 DIAGNOSIS — R10.13 EPIGASTRIC PAIN: ICD-10-CM

## 2019-06-13 PROCEDURE — 500002 HCHG ADHESIVE, DERMABOND: Performed by: SURGERY

## 2019-06-13 PROCEDURE — 160039 HCHG SURGERY MINUTES - EA ADDL 1 MIN LEVEL 3: Performed by: SURGERY

## 2019-06-13 PROCEDURE — 700105 HCHG RX REV CODE 258: Performed by: SURGERY

## 2019-06-13 PROCEDURE — 160025 RECOVERY II MINUTES (STATS): Performed by: SURGERY

## 2019-06-13 PROCEDURE — 160046 HCHG PACU - 1ST 60 MINS PHASE II: Performed by: SURGERY

## 2019-06-13 PROCEDURE — 700111 HCHG RX REV CODE 636 W/ 250 OVERRIDE (IP): Performed by: ANESTHESIOLOGY

## 2019-06-13 PROCEDURE — 160035 HCHG PACU - 1ST 60 MINS PHASE I: Performed by: SURGERY

## 2019-06-13 PROCEDURE — 700101 HCHG RX REV CODE 250: Performed by: ANESTHESIOLOGY

## 2019-06-13 PROCEDURE — 36561 INSERT TUNNELED CV CATH: CPT | Mod: RT | Performed by: SURGERY

## 2019-06-13 PROCEDURE — 160028 HCHG SURGERY MINUTES - 1ST 30 MINS LEVEL 3: Performed by: SURGERY

## 2019-06-13 PROCEDURE — 700102 HCHG RX REV CODE 250 W/ 637 OVERRIDE(OP): Performed by: ANESTHESIOLOGY

## 2019-06-13 PROCEDURE — 160048 HCHG OR STATISTICAL LEVEL 1-5: Performed by: SURGERY

## 2019-06-13 PROCEDURE — 160002 HCHG RECOVERY MINUTES (STAT): Performed by: SURGERY

## 2019-06-13 PROCEDURE — 501838 HCHG SUTURE GENERAL: Performed by: SURGERY

## 2019-06-13 PROCEDURE — 160036 HCHG PACU - EA ADDL 30 MINS PHASE I: Performed by: SURGERY

## 2019-06-13 PROCEDURE — A9270 NON-COVERED ITEM OR SERVICE: HCPCS | Performed by: ANESTHESIOLOGY

## 2019-06-13 PROCEDURE — 77001 FLUOROGUIDE FOR VEIN DEVICE: CPT | Mod: 26 | Performed by: SURGERY

## 2019-06-13 PROCEDURE — C1788 PORT, INDWELLING, IMP: HCPCS | Performed by: SURGERY

## 2019-06-13 PROCEDURE — 160009 HCHG ANES TIME/MIN: Performed by: SURGERY

## 2019-06-13 DEVICE — POWER PORTMRI COMPATABLE: Type: IMPLANTABLE DEVICE | Status: FUNCTIONAL

## 2019-06-13 RX ORDER — CELECOXIB 200 MG/1
400 CAPSULE ORAL ONCE
Status: COMPLETED | OUTPATIENT
Start: 2019-06-13 | End: 2019-06-13

## 2019-06-13 RX ORDER — LABETALOL HYDROCHLORIDE 5 MG/ML
5 INJECTION, SOLUTION INTRAVENOUS
Status: DISCONTINUED | OUTPATIENT
Start: 2019-06-13 | End: 2019-06-13 | Stop reason: HOSPADM

## 2019-06-13 RX ORDER — ACETAMINOPHEN 500 MG
1000 TABLET ORAL ONCE
Status: COMPLETED | OUTPATIENT
Start: 2019-06-13 | End: 2019-06-13

## 2019-06-13 RX ORDER — METOCLOPRAMIDE HYDROCHLORIDE 5 MG/ML
INJECTION INTRAMUSCULAR; INTRAVENOUS PRN
Status: DISCONTINUED | OUTPATIENT
Start: 2019-06-13 | End: 2019-06-13 | Stop reason: SURG

## 2019-06-13 RX ORDER — OXYCODONE HCL 5 MG/5 ML
10 SOLUTION, ORAL ORAL
Status: DISCONTINUED | OUTPATIENT
Start: 2019-06-13 | End: 2019-06-13 | Stop reason: HOSPADM

## 2019-06-13 RX ORDER — BUPIVACAINE HYDROCHLORIDE AND EPINEPHRINE 5; 5 MG/ML; UG/ML
INJECTION, SOLUTION EPIDURAL; INTRACAUDAL; PERINEURAL
Status: DISCONTINUED | OUTPATIENT
Start: 2019-06-13 | End: 2019-06-13 | Stop reason: HOSPADM

## 2019-06-13 RX ORDER — OXYCODONE HYDROCHLORIDE 15 MG/1
7.5 TABLET ORAL
Status: ON HOLD | COMMUNITY
End: 2019-11-09

## 2019-06-13 RX ORDER — PROMETHAZINE HYDROCHLORIDE 25 MG/1
12.5 TABLET ORAL EVERY 6 HOURS PRN
Qty: 30 TAB | Refills: 0 | Status: ON HOLD | OUTPATIENT
Start: 2019-06-13 | End: 2019-11-09

## 2019-06-13 RX ORDER — SODIUM CHLORIDE, SODIUM LACTATE, POTASSIUM CHLORIDE, CALCIUM CHLORIDE 600; 310; 30; 20 MG/100ML; MG/100ML; MG/100ML; MG/100ML
INJECTION, SOLUTION INTRAVENOUS CONTINUOUS
Status: DISCONTINUED | OUTPATIENT
Start: 2019-06-13 | End: 2019-06-13 | Stop reason: HOSPADM

## 2019-06-13 RX ORDER — GABAPENTIN 300 MG/1
300 CAPSULE ORAL ONCE
Status: COMPLETED | OUTPATIENT
Start: 2019-06-13 | End: 2019-06-13

## 2019-06-13 RX ORDER — ONDANSETRON 2 MG/ML
INJECTION INTRAMUSCULAR; INTRAVENOUS PRN
Status: DISCONTINUED | OUTPATIENT
Start: 2019-06-13 | End: 2019-06-13 | Stop reason: SURG

## 2019-06-13 RX ORDER — OXYCODONE HCL 5 MG/5 ML
5 SOLUTION, ORAL ORAL
Status: DISCONTINUED | OUTPATIENT
Start: 2019-06-13 | End: 2019-06-13 | Stop reason: HOSPADM

## 2019-06-13 RX ORDER — CEFAZOLIN SODIUM 1 G/3ML
INJECTION, POWDER, FOR SOLUTION INTRAMUSCULAR; INTRAVENOUS PRN
Status: DISCONTINUED | OUTPATIENT
Start: 2019-06-13 | End: 2019-06-13 | Stop reason: SURG

## 2019-06-13 RX ORDER — DIPHENHYDRAMINE HYDROCHLORIDE 50 MG/ML
12.5 INJECTION INTRAMUSCULAR; INTRAVENOUS
Status: DISCONTINUED | OUTPATIENT
Start: 2019-06-13 | End: 2019-06-13 | Stop reason: HOSPADM

## 2019-06-13 RX ORDER — HALOPERIDOL 5 MG/ML
1 INJECTION INTRAMUSCULAR
Status: DISCONTINUED | OUTPATIENT
Start: 2019-06-13 | End: 2019-06-13 | Stop reason: HOSPADM

## 2019-06-13 RX ORDER — TRAMADOL HYDROCHLORIDE 50 MG/1
50 TABLET ORAL EVERY 4 HOURS PRN
Qty: 40 TAB | Refills: 0 | Status: SHIPPED | OUTPATIENT
Start: 2019-06-13 | End: 2019-06-20

## 2019-06-13 RX ORDER — MAGNESIUM HYDROXIDE 1200 MG/15ML
LIQUID ORAL
Status: COMPLETED | OUTPATIENT
Start: 2019-06-13 | End: 2019-06-13

## 2019-06-13 RX ORDER — HYDRALAZINE HYDROCHLORIDE 20 MG/ML
5 INJECTION INTRAMUSCULAR; INTRAVENOUS
Status: DISCONTINUED | OUTPATIENT
Start: 2019-06-13 | End: 2019-06-13 | Stop reason: HOSPADM

## 2019-06-13 RX ORDER — HEPARIN SODIUM,PORCINE 1000/ML
VIAL (ML) INJECTION
Status: DISCONTINUED | OUTPATIENT
Start: 2019-06-13 | End: 2019-06-13 | Stop reason: HOSPADM

## 2019-06-13 RX ORDER — DEXAMETHASONE SODIUM PHOSPHATE 4 MG/ML
INJECTION, SOLUTION INTRA-ARTICULAR; INTRALESIONAL; INTRAMUSCULAR; INTRAVENOUS; SOFT TISSUE PRN
Status: DISCONTINUED | OUTPATIENT
Start: 2019-06-13 | End: 2019-06-13 | Stop reason: SURG

## 2019-06-13 RX ORDER — OMEPRAZOLE 40 MG/1
40 CAPSULE, DELAYED RELEASE ORAL DAILY
Qty: 30 CAP | Refills: 11 | Status: ON HOLD | OUTPATIENT
Start: 2019-06-13 | End: 2019-11-09

## 2019-06-13 RX ADMIN — ACETAMINOPHEN 1000 MG: 500 TABLET ORAL at 06:30

## 2019-06-13 RX ADMIN — SODIUM CHLORIDE, POTASSIUM CHLORIDE, SODIUM LACTATE AND CALCIUM CHLORIDE: 600; 310; 30; 20 INJECTION, SOLUTION INTRAVENOUS at 07:02

## 2019-06-13 RX ADMIN — MIDAZOLAM HYDROCHLORIDE 2 MG: 1 INJECTION, SOLUTION INTRAMUSCULAR; INTRAVENOUS at 07:02

## 2019-06-13 RX ADMIN — GABAPENTIN 300 MG: 300 CAPSULE ORAL at 06:30

## 2019-06-13 RX ADMIN — METOCLOPRAMIDE 10 MG: 5 INJECTION, SOLUTION INTRAMUSCULAR; INTRAVENOUS at 07:09

## 2019-06-13 RX ADMIN — DEXAMETHASONE SODIUM PHOSPHATE 10 MG: 4 INJECTION, SOLUTION INTRA-ARTICULAR; INTRALESIONAL; INTRAMUSCULAR; INTRAVENOUS; SOFT TISSUE at 07:09

## 2019-06-13 RX ADMIN — CEFAZOLIN 2 G: 330 INJECTION, POWDER, FOR SOLUTION INTRAMUSCULAR; INTRAVENOUS at 07:09

## 2019-06-13 RX ADMIN — CELECOXIB 400 MG: 200 CAPSULE ORAL at 06:30

## 2019-06-13 RX ADMIN — FENTANYL CITRATE 50 MCG: 50 INJECTION, SOLUTION INTRAMUSCULAR; INTRAVENOUS at 07:13

## 2019-06-13 RX ADMIN — ONDANSETRON 8 MG: 2 INJECTION INTRAMUSCULAR; INTRAVENOUS at 07:48

## 2019-06-13 RX ADMIN — PROPOFOL 200 MG: 10 INJECTION, EMULSION INTRAVENOUS at 07:07

## 2019-06-13 NOTE — ANESTHESIA POSTPROCEDURE EVALUATION
Patient: Tima Rizzo    Procedure Summary     Date:  06/13/19 Room / Location:  [place] [address] / SURGERY [facility]    Anesthesia Start:  0702 Anesthesia Stop:  0758    Procedure:  INSERTION, CATHETER- CEPHALIC POWER PORT   (Right Chest) Diagnosis:  (GASTRIC CANCER)    Surgeon:  Dickson Simpson M.D. Responsible Provider:  Fadia Ramos M.D.    Anesthesia Type:  general ASA Status:  2          Final Anesthesia Type: general  Last vitals  BP   Blood Pressure: 144/111, NIBP: 124/98    Temp   36.3 °C (97.4 °F)    Pulse   Pulse: 73, Heart Rate (Monitored): 65   Resp   15    SpO2   97 %      Anesthesia Post Evaluation    Patient location during evaluation: PACU  Patient participation: complete - patient participated  Level of consciousness: awake and alert    Airway patency: patent  Anesthetic complications: no  Cardiovascular status: hemodynamically stable  Respiratory status: acceptable  Hydration status: euvolemic    PONV: none           Nurse Pain Score: 0 (NPRS)

## 2019-06-13 NOTE — DISCHARGE INSTRUCTIONS
ACTIVITY: Rest and take it easy for the first 24 hours.  A responsible adult is recommended to remain with you during that time.  It is normal to feel sleepy.  We encourage you to not do anything that requires balance, judgment or coordination.    MILD FLU-LIKE SYMPTOMS ARE NORMAL. YOU MAY EXPERIENCE GENERALIZED MUSCLE ACHES, THROAT IRRITATION, HEADACHE AND/OR SOME NAUSEA.    FOR 24 HOURS DO NOT:  Drive, operate machinery or run household appliances.  Drink beer or alcoholic beverages.   Make important decisions or sign legal documents.    SPECIAL INSTRUCTIONS:   OK to shower in AM  Follow up with DR. Simpson in 4 weeks      DIET: To avoid nausea, slowly advance diet as tolerated, avoiding spicy or greasy foods for the first day.  Add more substantial food to your diet according to your physician's instructions.  Babies can be fed formula or breast milk as soon as they are hungry.  INCREASE FLUIDS AND FIBER TO AVOID CONSTIPATION.    SURGICAL DRESSING/BATHING: OK to shower in the morning.    FOLLOW-UP APPOINTMENT:  A follow-up appointment should be arranged with your doctor in 4 weeks, call to schedule.    You should CALL YOUR PHYSICIAN if you develop:  Fever greater than 101 degrees F.  Pain not relieved by medication, or persistent nausea or vomiting.  Excessive bleeding (blood soaking through dressing) or unexpected drainage from the wound.  Extreme redness or swelling around the incision site, drainage of pus or foul smelling drainage.  Inability to urinate or empty your bladder within 8 hours.  Problems with breathing or chest pain.    You should call 911 if you develop problems with breathing or chest pain.  If you are unable to contact your doctor or surgical center, you should go to the nearest emergency room or urgent care center.  Physician's telephone #: [phone].    If any questions arise, call your doctor.  If your doctor is not available, please feel free to call the Surgical Center at  [phone].  The Center is open Monday through Friday from 7AM to 7PM.  You can also call the HEALTH HOTLINE open 24 hours/day, 7 days/week and speak to a nurse at [phone], or toll free at [phone].    A registered nurse may call you a few days after your surgery to see how you are doing after your procedure.    MEDICATIONS: Resume taking daily medication.  Take prescribed pain medication with food.  If no medication is prescribed, you may take non-aspirin pain medication if needed.  PAIN MEDICATION CAN BE VERY CONSTIPATING.  Take a stool softener or laxative such as senokot, pericolace, or milk of magnesia if needed.    Prescription given for Tramadol (pain), Phenergan (nausea), Prilosec  Last pain medication given at (none needed.)    If your physician has prescribed pain medication that includes Acetaminophen (Tylenol), do not take additional Acetaminophen (Tylenol) while taking the prescribed medication.    Depression / Suicide Risk    As you are discharged from this [facility] facility, it is important to learn how to keep safe from harming yourself.    Recognize the warning signs:  · Abrupt changes in personality, positive or negative- including increase in energy   · Giving away possessions  · Change in eating patterns- significant weight changes-  positive or negative  · Change in sleeping patterns- unable to sleep or sleeping all the time   · Unwillingness or inability to communicate  · Depression  · Unusual sadness, discouragement and loneliness  · Talk of wanting to die  · Neglect of personal appearance   · Rebelliousness- reckless behavior  · Withdrawal from people/activities they love  · Confusion- inability to concentrate     If you or a loved one observes any of these behaviors or has concerns about self-harm, here's what you can do:  · Talk about it- your feelings and reasons for harming yourself  · Remove any means that you might use to hurt yourself (examples: pills, rope,  extension cords, firearm)  · Get professional help from the community (Mental Health, Substance Abuse, psychological counseling)  · Do not be alone:Call your Safe Contact- someone whom you trust who will be there for you.  · Call your local CRISIS HOTLINE 309-4744 or 713-891-4296  · Call your local Children's Mobile Crisis Response Team [place] (980) 795-0702 or www.[employer]  · Call the toll free National Suicide Prevention Hotlines   · National Suicide Prevention Lifeline 762-004-QVTH (3206)  · National Hope Line Network 800-SUICIDE (838-9962)

## 2019-06-13 NOTE — OP REPORT
DATE OF SERVICE:  06/13/2019    INDICATIONS:  The patient is a 48-year-old male patient referred to me after   having been diagnosed with gastric carcinoma, appears to be somewhere T3   lesion, no sign of metastatic disease, but based on these findings, the   patient was scheduled for neoadjuvant chemoradiotherapy.  His medical   oncologist is Dr. Keyes.    SURGEON:  Dickson Simpson MD    ASSISTANT:  None.    ANESTHESIA:  General plus local anesthetic.    ESTIMATED BLOOD LOSS:  Less than 5 mL.    COMPLICATIONS:  No complications.    FINDINGS:  Fluoroscopically placed catheter to the superior vena cava atrial   junction with great inflow and outflow.    PROCEDURE DONE:  Placement of right cephalic cutdown PowerPort.    DESCRIPTION OF PROCEDURE:  The patient was brought to the OR and placed under   general anesthetic with right arm, chest and neck prepped with chlorhexidine   prep, covered with sterile drapes, given preoperative antibiotics.  Timeout   was done, which was adequate.  At that point, he was given 5 mL of 0.5%   Marcaine in the region of the deltopectoral groove.  Incision was made with 10   blade and Bovie electrocautery.  Upon entering the deltopectoral groove,   Metzenbaum scissors was used and the patient was found to have a large   cephalic vein.  Proximal distal control was gained with 3-0 Vicryl.  The   distal was tied, proximal was elevated.  A small venotomy was created and a   10-Iraqi flushed PowerPort catheter was inserted under fluoroscopic guidance   into the superior vena cava atrial junction.  Once in position, having great   inflow and outflow, the proximal was tied, it was clamped with a rubber shot,   cut, and attached to a flushed PowerPort with a cuff.  It was then clamped,   tested for a second time having great inflow and outflow.  At that point, the   patient was placed in reverse Trendelenburg and a pocket was created along the   pec fascia with Bovie electrocautery.   Once in position, we secured the port   to the pec fascia with interrupted 2-0 Prolene x4.  It was tested for a third   time having great inflow and outflow.  At that point, the operation was   completed.  The patient was given a total of 30 mL of 0.5% Marcaine with   epinephrine throughout the area of the soft tissue, skin and pec fascia.  Soft   tissue was brought together with interrupted 3-0 Vicryl.  Skin was closed   with 4-0 Monocryl.  Dermabond was placed, extubated and transferred to   recovery.       ____________________________________     MD GABRIEL Canseco / BG    DD:  06/13/2019 08:06:25  DT:  06/13/2019 08:20:15    D#:  [id number]  Job#:  [id number]    cc: Olimpia Keyes MD

## 2019-06-13 NOTE — ANESTHESIA PROCEDURE NOTES
Airway  Date/Time: 6/13/2019 7:08 AM  Performed by: SANDRA PEDRO  Authorized by: SANDRA PEDRO     Location:  OR  Urgency:  Elective  Indications for Airway Management:  Anesthesia  Spontaneous Ventilation: absent    Sedation Level:  Deep  Preoxygenated: Yes    Mask Difficulty Assessment:  1 - vent by mask  Final Airway Type:  Supraglottic airway  Final Supraglottic Airway:  Standard LMA  SGA Size:  4  Airway Seal Pressure (cm H2O):  22  Number of Attempts at Approach:  1

## 2019-06-13 NOTE — ANESTHESIA TIME REPORT
Anesthesia Start and Stop Event Times     Date Time Event    6/13/2019 0702 Anesthesia Start     0758 Anesthesia Stop        Responsible Staff  06/13/19    Name Role Begin End    Fadia Ramos M.D. Anesth 0702 0758        Preop Diagnosis (Free Text):  Pre-op Diagnosis     GASTRIC CANCER        Preop Diagnosis (Codes):  Diagnosis Information     Diagnosis Code(s):         Post op Diagnosis  Gastric cancer (HCC)      Premium Reason  Non-Premium    Comments:

## 2019-06-13 NOTE — ANESTHESIA PREPROCEDURE EVALUATION
Relevant Problems   (+) Delayed emergence from anesthesia   (+) Gastric cancer (HCC)   (+) HTN (hypertension)   (+) Marijuana use (daily)   (+) Psychiatric problem (anxiety, depression)   (+) Tobacco abuse (1/2ppd x 30yr)       Physical Exam    Airway   Mallampati: II  TM distance: >3 FB  Neck ROM: full       Cardiovascular - normal exam  Rhythm: regular  Rate: normal  (-) murmur     Dental - normal exam         Pulmonary - normal exam  Breath sounds clear to auscultation     Abdominal    Neurological - normal exam                 Anesthesia Plan    ASA 2       Plan - general       Airway plan will be LMA        Induction: intravenous    Postoperative Plan: Postoperative administration of opioids is intended.    Pertinent diagnostic labs and testing reviewed    Informed Consent:    Anesthetic plan and risks discussed with patient.    Use of blood products discussed with: patient whom consented to blood products.

## 2019-06-14 LAB
GAMMA INTERFERON BACKGROUND BLD IA-ACNC: 0.03 IU/ML
M TB IFN-G BLD-IMP: NEGATIVE
M TB IFN-G CD4+ BCKGRND COR BLD-ACNC: 0.32 IU/ML
MITOGEN IGNF BCKGRD COR BLD-ACNC: >10 IU/ML
QFT TB2 - NIL TBQ2: 0.18 IU/ML

## 2019-06-16 PROCEDURE — 99358 PROLONG SERVICE W/O CONTACT: CPT | Performed by: MEDICAL GENETICS

## 2019-06-16 NOTE — PROGRESS NOTES
"  Non face to face prolonged services of clinical data and chart information reviewed for a total time of 30 minutes performed on 6./16 for Tima Rizzo    Reviewed were:    Referral    Previous encounters    Medications    Imaging    Previous procedures    Other Orders    Letters    Notes    Media    Miscellaneous reports    Patient summaries        Counseling, testing information and materials prepared    \"I spent 30 minutes  from 0500 to 0530 reviewing past records, prior to visit pertaining to genetic risk.\"     Hari Ovalles M.D.    "

## 2019-06-17 ENCOUNTER — OFFICE VISIT (OUTPATIENT)
Dept: HEMATOLOGY ONCOLOGY | Facility: MEDICAL CENTER | Age: 49
End: 2019-06-17
Payer: MEDICARE

## 2019-06-17 ENCOUNTER — NON-PROVIDER VISIT (OUTPATIENT)
Dept: HEMATOLOGY ONCOLOGY | Facility: MEDICAL CENTER | Age: 49
End: 2019-06-17
Payer: MEDICARE

## 2019-06-17 VITALS
SYSTOLIC BLOOD PRESSURE: 102 MMHG | OXYGEN SATURATION: 98 % | DIASTOLIC BLOOD PRESSURE: 80 MMHG | HEART RATE: 89 BPM | WEIGHT: 149.36 LBS | RESPIRATION RATE: 14 BRPM | HEIGHT: 67 IN | TEMPERATURE: 98.5 F | BODY MASS INDEX: 23.44 KG/M2

## 2019-06-17 VITALS
TEMPERATURE: 98.5 F | DIASTOLIC BLOOD PRESSURE: 80 MMHG | BODY MASS INDEX: 23.39 KG/M2 | RESPIRATION RATE: 14 BRPM | WEIGHT: 149 LBS | OXYGEN SATURATION: 98 % | HEIGHT: 67 IN | HEART RATE: 89 BPM | SYSTOLIC BLOOD PRESSURE: 102 MMHG

## 2019-06-17 DIAGNOSIS — Z80.41 FAMILY HISTORY OF OVARIAN CANCER: ICD-10-CM

## 2019-06-17 DIAGNOSIS — Z80.3 FAMILY HISTORY OF BREAST CANCER: ICD-10-CM

## 2019-06-17 DIAGNOSIS — Z80.0 FAMILY HISTORY OF MALIGNANT NEOPLASM OF GASTROINTESTINAL TRACT: ICD-10-CM

## 2019-06-17 DIAGNOSIS — Z80.41 FAMILY HISTORY OF MALIGNANT NEOPLASM OF OVARY: ICD-10-CM

## 2019-06-17 DIAGNOSIS — C16.1 MALIGNANT NEOPLASM OF FUNDUS OF STOMACH (HCC): ICD-10-CM

## 2019-06-17 DIAGNOSIS — Z80.0 FAMILY HISTORY OF GASTRIC CANCER: ICD-10-CM

## 2019-06-17 DIAGNOSIS — Z80.3 FAMILY HISTORY OF MALIGNANT NEOPLASM OF BREAST: ICD-10-CM

## 2019-06-17 PROCEDURE — 99203 OFFICE O/P NEW LOW 30 MIN: CPT | Performed by: MEDICAL GENETICS

## 2019-06-17 PROCEDURE — 36415 COLL VENOUS BLD VENIPUNCTURE: CPT | Performed by: MEDICAL GENETICS

## 2019-06-17 ASSESSMENT — PAIN SCALES - GENERAL
PAINLEVEL: 7=MODERATE-SEVERE PAIN
PAINLEVEL: 7=MODERATE-SEVERE PAIN

## 2019-06-17 NOTE — NON-PROVIDER
.Tima Rizzo is a 48 y.o. male here for a non-provider visit for: Lab Draws  on 6/17/2019 at 8:51 AM    Procedure Performed: Venipuncture     Anatomical site: Right Antecubital Area (AC)    Equipment used: 21g vacutainer    Labs drawn: "[employer] (two lavender EDTA tubes)    Ordering Provider: {Dr. Hari Grace By: Kaity Duncan, Med Ass't

## 2019-06-17 NOTE — PROGRESS NOTES
GENETIC RISK ASSESSMENT    Tima Rizzo is a 48 y.o. year old patient who was referred by Mike for cancer genetic risk assessment.    Chief Complaint: recently diagnosed gastric cancer    HPI: The patient was well until 1 month ago at which time a diagnosis of gastric cancer was made.  The patient's family history is remarkable for family history of breast (<50), gastric, and ovarian cancer.   Review of personal and family histories suggested that a cancer genetic risk assessment was in order.    Review of Systems (ROS):  Constitutional normal  Eyes normal  ENT normal   Respiratory normal  Cardiovascular normal  Gastrointestinal normal  Genitourinary normal  Musculoskeletal normal  Skin normal  Neurological normal  Endocrine normal  Psychiatric normal  Hematologic/lymphatic normal  Allergic/Immunologic none  All other systems reviewed and are negative.    History (Past/Family)  Family History:   No family history on file.   3 generation pedigree built   Yes   [place] empiric risk calculation No   [place] II empiric risk calculation  No    Social History:       Social History     Social History   • Marital status: Single     Spouse name: N/A   • Number of children: N/A   • Years of education: N/A     Social History Main Topics   • Smoking status: Current Every Day Smoker     Packs/day: 0.50     Years: 30.00     Types: Cigarettes   • Smokeless tobacco: Never Used   • Alcohol use Yes      Comment: 2 per year   • Drug use: Yes     Types: Marijuana      Comment: Cannabis, daily   • Sexual activity: Not on file     Other Topics Concern   • Not on file     Social History Narrative   • No narrative on file       Patient Past History:  Past Medical History:   Diagnosis Date   • Anesthesia     slow to wake up after EGD   • Anxiety    • Bell palsy 2000; 2010    x2; mild left facial droop   • Cancer (HCC) 2019    Gastric   • Enlarged prostate    • History of positive PPD, untreated    • Hypertension    •  "Numbness of arm     and hands, bilateral   • Pain     neck and arms   • Prediabetes    • Psychiatric problem     depression    • Snoring    • Urinary bladder disorder     frequent urination (enlarged prostate)           NCCN Testing Criteria Met                            Yes    Physical Exam  Constitutional    Ht 1.702 m (5' 7\")   Wt 67.8 kg (149 lb 5.8 oz)   BMI 23.39 kg/m²         General appearance: normal   Head Circumference:   ([place])  Eyes    Conjunctiva: clear   Pupils: reactive     ENMT    External inspection: noraml   Assessment of Hearing: normal   Lips/cheeks/gums: no lesions  Neck   External exam: normal   Thyroid: no masses  Respiratory   Auscultation: clear    Cardiovascular   Auscultation: RRR   Edema : none  Chest   Breasts (exam): not done   Palpation:   GI   External exam and palpation: normal     Pelvic (female): not doen   External exam (male):   Lymphatic   Palpation in 2 areas: normla    Musculoskeletal   Gait: normal   Digits and Nails: no lesions  Skin   Inspection: normal   Palpation: no masses                  Fibrofolliculoma: absent                  Trichodiscoma: absent                   Akrochordon: absent                   CAfe-au-lait:  absent                  Hypopigmentation: absent                  Mucosal freckling:  absent  Neurologic   Cranial nerves: intact   DTR’s: 2+       Assessment and Plan:  Patient with recent diagnosis of disseminated gastric cancer and family history of gastric, breast and ovarian cancer    I spent a total of 40 minutes of face to face time with >50% of time spent in counseling and coordination of care discussing matters stated in above note.    Ambry panel ordered      Hari Ovalles M.D.  "

## 2019-06-18 ENCOUNTER — HOSPITAL ENCOUNTER (OUTPATIENT)
Dept: RADIOLOGY | Facility: MEDICAL CENTER | Age: 49
End: 2019-06-18
Attending: SURGERY
Payer: MEDICARE

## 2019-06-18 DIAGNOSIS — R13.10 DYSPHAGIA, UNSPECIFIED TYPE: ICD-10-CM

## 2019-06-18 DIAGNOSIS — C16.1 MALIGNANT NEOPLASM OF FUNDUS OF STOMACH (HCC): ICD-10-CM

## 2019-06-18 PROCEDURE — A9552 F18 FDG: HCPCS

## 2019-07-02 RX ORDER — 0.9 % SODIUM CHLORIDE 0.9 %
VIAL (ML) INJECTION PRN
Status: CANCELLED | OUTPATIENT
Start: 2019-07-05

## 2019-07-02 RX ORDER — 0.9 % SODIUM CHLORIDE 0.9 %
VIAL (ML) INJECTION PRN
Status: CANCELLED | OUTPATIENT
Start: 2019-07-04

## 2019-07-02 RX ORDER — DEXTROSE MONOHYDRATE 50 MG/ML
INJECTION, SOLUTION INTRAVENOUS CONTINUOUS
Status: CANCELLED | OUTPATIENT
Start: 2019-07-05

## 2019-07-02 RX ORDER — 0.9 % SODIUM CHLORIDE 0.9 %
5 VIAL (ML) INJECTION PRN
Status: CANCELLED | OUTPATIENT
Start: 2019-07-04

## 2019-07-02 RX ORDER — 0.9 % SODIUM CHLORIDE 0.9 %
5 VIAL (ML) INJECTION PRN
Status: CANCELLED | OUTPATIENT
Start: 2019-07-05

## 2019-07-02 RX ORDER — 0.9 % SODIUM CHLORIDE 0.9 %
20 VIAL (ML) INJECTION PRN
Status: CANCELLED | OUTPATIENT
Start: 2019-07-06

## 2019-07-02 RX ORDER — ONDANSETRON 8 MG/1
8 TABLET, ORALLY DISINTEGRATING ORAL PRN
Status: CANCELLED | OUTPATIENT
Start: 2019-07-05

## 2019-07-02 RX ORDER — PROCHLORPERAZINE MALEATE 10 MG
10 TABLET ORAL EVERY 6 HOURS PRN
Status: CANCELLED | OUTPATIENT
Start: 2019-07-05

## 2019-07-02 RX ORDER — ONDANSETRON 2 MG/ML
4 INJECTION INTRAMUSCULAR; INTRAVENOUS PRN
Status: CANCELLED | OUTPATIENT
Start: 2019-07-05

## 2019-07-03 ENCOUNTER — OFFICE VISIT (OUTPATIENT)
Dept: HEMATOLOGY ONCOLOGY | Facility: MEDICAL CENTER | Age: 49
End: 2019-07-03
Payer: MEDICARE

## 2019-07-03 DIAGNOSIS — Z71.89 ENCOUNTER FOR MEDICATION COUNSELING: ICD-10-CM

## 2019-07-03 NOTE — PROGRESS NOTES
Tima Rizzo is a 48 y.o. male who presents with gastric cancer here for chemotherapy/biotherapy education class.    Patient is established with Dr. Keyes for cancer care.  He will be starting FLOT. Patient is accompanied by his supportive family member.     Educated patient on chemotherapy and biotherapy including but not limited to: Infusion Policies and procedures, cycling of chemotherapy, infusion reactions, rare but severe side effects, alopecia, myelosuppression, infection prevention, peripheral neuropathy, fatigue, sexual changes, GI distress, use of specific nausea medications, avoidance of alcoholic beverages and supplement medications, nutrition, hydration, use of sunscreen, chemotherapy precautions at home, venous access options and invasive procedures. Patient informed of severe and emergent side effects that warrant a call to MD and/or visit to the emergency department.  Patient was provided with Powerpoint handout, NCI chemotherapy and you book, NCI eating hints book, [facility] side effects sheet.      Patient to receive drug specific teaching and individual plan of treatment from Dr. Keyes.    Spent 90 minutes of continuous, uninterrupted, face-to-face patient contact in which greater than 50% of the visit was spent counseling and coordinating of care.

## 2019-07-03 NOTE — PROGRESS NOTES
"Pharmacy Chemotherapy Calculation:    Patient name: Tima Rizzo  DX: gastric cancer    Cycle: 1      Previous treatment = n/a    Protocol: FLOT  Docetaxel 50 mg/m2 on Day 1  Oxaliplatin 85 mg/m2 on Day 1  Leucovorin 200 mg/m2 on Day 1  Fluorouracil 2600 mg/m2 CIVI over 24 hours on Day 1  Every 14 days for 4 cycles preoperatively and 4 cycles postoperatively   NCCN Guidelines for Gastric Cancer v.1.2019  Al-Stuart DORSEY, et al. Lancet Oncol. 2016 Dec;17(12):2141-3464.       Allergies:  Patient has no known allergies.    /87   Pulse 67   Temp 36.9 °C (98.4 °F) (Temporal)   Resp 18   Ht 1.683 m (5' 6.25\") Comment: 1 inch off shoes.  Wt 67.1 kg (147 lb 14.9 oz)   SpO2 94%   BMI 23.70 kg/m²   Body surface area is 1.77 meters squared.       Labs reviewed (7/5/19), all within plan treatment parameters      Docetaxel 50 mg/m2 x 1.77 m2 = 88.5 mg   <10% difference, okay to treat with final dose = 88.6 mg    Oxaliplatin 85 mg/m2 x 1.77 m2 = 150.45 mg   <10% difference, okay to treat with final dose = 150.45 mg    Leucovorin 200 mg/m2 x 1.77 m2 = 354 mg   <10% difference, okay to treat with final dose = 354 mg    Fluorouracil 2600 mg/m2  x 1.77 m2 = 4602 mg   <10% difference, okay to treat with final dose = 4600 mg in CADD pump for home infusion    Smooth Bashir, MelaD      "

## 2019-07-05 ENCOUNTER — OUTPATIENT INFUSION SERVICES (OUTPATIENT)
Dept: ONCOLOGY | Facility: MEDICAL CENTER | Age: 49
End: 2019-07-05
Attending: INTERNAL MEDICINE
Payer: MEDICARE

## 2019-07-05 VITALS
TEMPERATURE: 98.4 F | DIASTOLIC BLOOD PRESSURE: 87 MMHG | RESPIRATION RATE: 18 BRPM | HEIGHT: 66 IN | BODY MASS INDEX: 23.77 KG/M2 | WEIGHT: 147.93 LBS | OXYGEN SATURATION: 94 % | HEART RATE: 67 BPM | SYSTOLIC BLOOD PRESSURE: 141 MMHG

## 2019-07-05 DIAGNOSIS — C16.2 MALIGNANT NEOPLASM OF BODY OF STOMACH (HCC): ICD-10-CM

## 2019-07-05 DIAGNOSIS — C16.1 MALIGNANT NEOPLASM OF FUNDUS OF STOMACH (HCC): Primary | ICD-10-CM

## 2019-07-05 LAB
ALBUMIN SERPL BCP-MCNC: 3.8 G/DL (ref 3.2–4.9)
ALBUMIN/GLOB SERPL: 1.5 G/DL
ALP SERPL-CCNC: 42 U/L (ref 30–99)
ALT SERPL-CCNC: 15 U/L (ref 2–50)
ANION GAP SERPL CALC-SCNC: 7 MMOL/L (ref 0–11.9)
AST SERPL-CCNC: 16 U/L (ref 12–45)
BASOPHILS # BLD AUTO: 0.4 % (ref 0–1.8)
BASOPHILS # BLD: 0.03 K/UL (ref 0–0.12)
BILIRUB SERPL-MCNC: 0.3 MG/DL (ref 0.1–1.5)
BUN SERPL-MCNC: 14 MG/DL (ref 8–22)
CALCIUM SERPL-MCNC: 9.2 MG/DL (ref 8.5–10.5)
CHLORIDE SERPL-SCNC: 103 MMOL/L (ref 96–112)
CO2 SERPL-SCNC: 27 MMOL/L (ref 20–33)
CREAT SERPL-MCNC: 0.99 MG/DL (ref 0.5–1.4)
EOSINOPHIL # BLD AUTO: 0.25 K/UL (ref 0–0.51)
EOSINOPHIL NFR BLD: 3 % (ref 0–6.9)
ERYTHROCYTE [DISTWIDTH] IN BLOOD BY AUTOMATED COUNT: 45.4 FL (ref 35.9–50)
GLOBULIN SER CALC-MCNC: 2.5 G/DL (ref 1.9–3.5)
GLUCOSE SERPL-MCNC: 78 MG/DL (ref 65–99)
HCT VFR BLD AUTO: 34.3 % (ref 42–52)
HGB BLD-MCNC: 10.9 G/DL (ref 14–18)
IMM GRANULOCYTES # BLD AUTO: 0.01 K/UL (ref 0–0.11)
IMM GRANULOCYTES NFR BLD AUTO: 0.1 % (ref 0–0.9)
LYMPHOCYTES # BLD AUTO: 2.54 K/UL (ref 1–4.8)
LYMPHOCYTES NFR BLD: 31 % (ref 22–41)
MCH RBC QN AUTO: 30.3 PG (ref 27–33)
MCHC RBC AUTO-ENTMCNC: 31.8 G/DL (ref 33.7–35.3)
MCV RBC AUTO: 95.3 FL (ref 81.4–97.8)
MONOCYTES # BLD AUTO: 0.58 K/UL (ref 0–0.85)
MONOCYTES NFR BLD AUTO: 7.1 % (ref 0–13.4)
NEUTROPHILS # BLD AUTO: 4.79 K/UL (ref 1.82–7.42)
NEUTROPHILS NFR BLD: 58.4 % (ref 44–72)
NRBC # BLD AUTO: 0 K/UL
NRBC BLD-RTO: 0 /100 WBC
PLATELET # BLD AUTO: 369 K/UL (ref 164–446)
PMV BLD AUTO: 9.2 FL (ref 9–12.9)
POTASSIUM SERPL-SCNC: 4.1 MMOL/L (ref 3.6–5.5)
PROT SERPL-MCNC: 6.3 G/DL (ref 6–8.2)
RBC # BLD AUTO: 3.6 M/UL (ref 4.7–6.1)
SODIUM SERPL-SCNC: 137 MMOL/L (ref 135–145)
WBC # BLD AUTO: 8.2 K/UL (ref 4.8–10.8)

## 2019-07-05 PROCEDURE — 700111 HCHG RX REV CODE 636 W/ 250 OVERRIDE (IP): Performed by: INTERNAL MEDICINE

## 2019-07-05 PROCEDURE — 85025 COMPLETE CBC W/AUTO DIFF WBC: CPT

## 2019-07-05 PROCEDURE — A4212 NON CORING NEEDLE OR STYLET: HCPCS

## 2019-07-05 PROCEDURE — 96375 TX/PRO/DX INJ NEW DRUG ADDON: CPT

## 2019-07-05 PROCEDURE — 700111 HCHG RX REV CODE 636 W/ 250 OVERRIDE (IP): Mod: JG

## 2019-07-05 PROCEDURE — 304540 HCHG NITRO SET VENT 2ND TUB

## 2019-07-05 PROCEDURE — 700105 HCHG RX REV CODE 258: Performed by: INTERNAL MEDICINE

## 2019-07-05 PROCEDURE — 96417 CHEMO IV INFUS EACH ADDL SEQ: CPT

## 2019-07-05 PROCEDURE — 80053 COMPREHEN METABOLIC PANEL: CPT

## 2019-07-05 PROCEDURE — 700105 HCHG RX REV CODE 258

## 2019-07-05 PROCEDURE — G0498 CHEMO EXTEND IV INFUS W/PUMP: HCPCS

## 2019-07-05 PROCEDURE — 96415 CHEMO IV INFUSION ADDL HR: CPT

## 2019-07-05 PROCEDURE — 96413 CHEMO IV INFUSION 1 HR: CPT

## 2019-07-05 PROCEDURE — 96368 THER/DIAG CONCURRENT INF: CPT

## 2019-07-05 RX ORDER — MORPHINE SULFATE 15 MG/1
15 TABLET ORAL EVERY 4 HOURS PRN
COMMUNITY
End: 2019-08-14

## 2019-07-05 RX ORDER — ONDANSETRON 8 MG/1
8 TABLET, ORALLY DISINTEGRATING ORAL EVERY 8 HOURS PRN
Status: ON HOLD | COMMUNITY
End: 2019-11-09

## 2019-07-05 RX ORDER — DEXTROSE MONOHYDRATE 50 MG/ML
INJECTION, SOLUTION INTRAVENOUS CONTINUOUS
Status: DISCONTINUED | OUTPATIENT
Start: 2019-07-05 | End: 2019-07-05 | Stop reason: HOSPADM

## 2019-07-05 RX ADMIN — LEUCOVORIN CALCIUM 354 MG: 350 INJECTION, POWDER, LYOPHILIZED, FOR SUSPENSION INTRAMUSCULAR; INTRAVENOUS at 15:21

## 2019-07-05 RX ADMIN — DEXTROSE MONOHYDRATE: 50 INJECTION, SOLUTION INTRAVENOUS at 12:29

## 2019-07-05 RX ADMIN — DEXTROSE MONOHYDRATE 150.45 MG: 50 INJECTION, SOLUTION INTRAVENOUS at 15:21

## 2019-07-05 RX ADMIN — FLUOROURACIL 4600 MG: 50 INJECTION, SOLUTION INTRAVENOUS at 17:41

## 2019-07-05 RX ADMIN — DEXAMETHASONE SODIUM PHOSPHATE 12 MG: 4 INJECTION, SOLUTION INTRAMUSCULAR; INTRAVENOUS at 12:48

## 2019-07-05 RX ADMIN — ONDANSETRON HYDROCHLORIDE 16 MG: 2 SOLUTION INTRAMUSCULAR; INTRAVENOUS at 12:29

## 2019-07-05 RX ADMIN — DOCETAXEL 88.6 MG: 20 INJECTION, SOLUTION, CONCENTRATE INTRAVENOUS at 13:21

## 2019-07-05 NOTE — PROGRESS NOTES
Chemotherapy Verification - SECONDARY RN       Height = 66.25in  Weight = 67.1kg  BSA = 1.77m2       Medication: Taxotere  Dose: 50mgkg  Calculated Dose: 88.5mg                             (In mg/m2, AUC, mg/kg)     Medication: Oxaliplatin  Dose: 85mg/m2  Calculated Dose: 150.45mg                             (In mg/m2, AUC, mg/kg)    Medication: Leucovorin  Dose: 200mg/m2  Calculated Dose: 354mg                             (In mg/m2, AUC, mg/kg)    Medication: Fluorouracil  Dose: 2600mg/m2  Calculated Dose: 4602mg IV via continuous pump over 24 hours                             (In mg/m2, AUC, mg/kg)        I confirm that this process was performed independently.

## 2019-07-05 NOTE — PROGRESS NOTES
"Pharmacy Chemotherapy Verification    Patient Name: Tima Rizzo   Dx: Malignant neoplasm of the fundus  Protocol: Fluorouracil/Leucovorin/OXALIplatin/DOCEtaxel (FLOT)     *Dosing Reference*  DOCEtaxel 50 mg/m2 IV over 60 minutes on Day 1  OXALIplatin 85 mg/m2 IV over 2 hours on Day 1 concurrent with   Leucovorin 200 mg/m2 IV over 2 hours on Day 1 followed by  Fluorouracil 2600 mg/m2 IV continuous infusion over 24 hours on Day 1  14 day cycle for 4 cycles preoperatively and 4 cycles postoperatively for a total of 8 cycles  NCCN Guidelines for Gastric Cancer.V.1.2019  Yanira DORSEY, et al. Lancet Oncol. 2016;178(12):9500-1889    Allergies:  Patient has no known allergies.     /87   Pulse 67   Temp 36.9 °C (98.4 °F) (Temporal)   Resp 18   Ht 1.683 m (5' 6.25\") Comment: 1 inch off shoes.  Wt 67.1 kg (147 lb 14.9 oz)   SpO2 94%   BMI 23.70 kg/m²  Body surface area is 1.77 meters squared.    Labs 7/5/19  ANC~ 4790  Hgb 10.9 Plt 369k  SCr 0.99 CrCl 85.7 mL/min   AST/ALT/AP = 16/15/42 Tbili = 0.3     Drug Order   (Drug name, dose, route, IV Fluid & volume, frequency, number of doses) Cycle: 1      Previous treatment: N/A     Medication = DOCEtaxel  Base Dose = 50 mg/m2  Calc Dose: Base Dose x 1.77 m2 = 88.5 mg  Final Dose = 88.6 mg  Route = IV  Fluid & Volume =  mL  Admin Duration = Over 60 minutes           <10% difference, OK to treat with final dose    Medication = OXALIplatin   Base Dose = 85 mg/m2  Calc Dose: Base Dose x 1.77 m2 = 150.45 mg  Final Dose = 150.45 mg  Route = IV  Fluid & Volume = D5W 250 mL  Admin Duration = Over 120 minutes          <10% difference, OK to treat with final dose    Medication = Leucovorin   Base Dose = 200 mg/m2  Calc Dose:Base Dose x 1.77 m2 = 354 mg  Final Dose = 354 mg  Route = IV  Fluid & Volume = D5W 250 mL  Admin Duration = Over 120 minutes          <10% difference, OK to treat with final dose    Medication = Fluorouracil   Base Dose = 2600 mg/m2  Calc Dose: " Base Dose x 1.77 m2 = 4602 mg  Final Dose = 4600 mg  Route = IV  Fluid & Volume = 50 mg/mL; 92 mL (+ 3 mLs)  Admin Duration = Over 24 hours @ 3.8 mL/hr          <10% difference, OK to treat with final dose      By my signature below, I confirm this process was performed independently with the BSA and all final chemotherapy dosing calculations congruent. I have reviewed the above chemotherapy order and that my calculation of the final dose and BSA (when applicable) corroborate those calculations of the  pharmacist. Discrepancies of 5% or greater in the written dose have been addressed and documented within the [facility] Progress notes.    Signature: Martine Steiner, MelaD, BCOP

## 2019-07-05 NOTE — PROGRESS NOTES
Chemotherapy Verification - PRIMARY RN      Height = 168.3cm  Weight = 67.1kg  BSA = 1.77m2       Medication: Taxotere  Dose: 50mg/m2  Calculated Dose: 88.5mg                             (In mg/m2, AUC, mg/kg)     Medication: Oxaliplatin  Dose: 85mg/m2  Calculated Dose: 150.45mg                             (In mg/m2, AUC, mg/kg)    Medication: Leucovorin  Dose: 200mg/m2  Calculated Dose: 354mg                             (In mg/m2, AUC, mg/kg)    Medication: Fluorouracil  Dose: 2,600mg/m2  Calculated Dose: 4,602mg                             (In mg/m2, AUC, mg/kg)  I confirm this process was performed independently with the BSA and all final chemotherapy dosing calculations congruent.  Any discrepancies of 10% or greater have been addressed with the chemotherapy pharmacist. The resolution of the discrepancy has been documented in the EPIC progress notes.

## 2019-07-05 NOTE — PROGRESS NOTES
Pt to [facility] for C1D1 FLOT.  Pt arrived accompanied by his mother. Both pt and his mother were anxious, with many questions regarding the treatment.  Teaching done, pt and his mother both verbalized a good understanding, and decrease in anxiety. Pt viewed video for CADD pump. Right chest wall port accessed with low profile Lopez needle. Flushed and derek easily. CBC & CMP drawn and sent to lab. Results reviewed, within treatable parameters.  Pt received premeds, followed by Docetaxel.  Pt tolerated Docetaxel well.  Oxaliplatin and Leucovorin administered concurrently.  Pt tolerated well. 24 hour CADD pump connected. Pt verbalized an understanding of when to return for pump disconnection and use of CADD pump. Pt left [facility] in NAD.

## 2019-07-06 ENCOUNTER — OUTPATIENT INFUSION SERVICES (OUTPATIENT)
Dept: ONCOLOGY | Facility: MEDICAL CENTER | Age: 49
End: 2019-07-06
Attending: INTERNAL MEDICINE
Payer: MEDICARE

## 2019-07-06 VITALS
SYSTOLIC BLOOD PRESSURE: 143 MMHG | DIASTOLIC BLOOD PRESSURE: 83 MMHG | HEART RATE: 60 BPM | RESPIRATION RATE: 16 BRPM | TEMPERATURE: 97.8 F

## 2019-07-06 DIAGNOSIS — C16.2 MALIGNANT NEOPLASM OF BODY OF STOMACH (HCC): ICD-10-CM

## 2019-07-06 PROCEDURE — 700111 HCHG RX REV CODE 636 W/ 250 OVERRIDE (IP)

## 2019-07-06 PROCEDURE — 96523 IRRIG DRUG DELIVERY DEVICE: CPT

## 2019-07-06 RX ORDER — NALOXONE HYDROCHLORIDE 4 MG/.1ML
1 SPRAY NASAL PRN
Refills: 0 | Status: ON HOLD | COMMUNITY
Start: 2019-06-03 | End: 2019-08-01

## 2019-07-06 RX ADMIN — HEPARIN 500 UNITS: 100 SYRINGE at 18:44

## 2019-07-07 ENCOUNTER — TELEPHONE (OUTPATIENT)
Dept: ONCOLOGY | Facility: MEDICAL CENTER | Age: 49
End: 2019-07-07

## 2019-07-07 NOTE — PROGRESS NOTES
Pt presented to infusion center for 5FU pump de-access. Pump had 0 in reservoir and total amount delivered. Pump disconnected, cleaned and returned to pharmacy with sabina pack in pt's labeled bag. Port flushed per protocol, brisk blood return observed, heparinized and de-accessed with needle intact, gauze dressing placed. Pt has next appt, printout given, left on foot in good spirits.

## 2019-07-08 NOTE — TELEPHONE ENCOUNTER
Follow up call placed.  No answer on mobile or home phones.  Left VM on home phone with call back number.

## 2019-07-16 ENCOUNTER — PATIENT OUTREACH (OUTPATIENT)
Dept: OTHER | Facility: MEDICAL CENTER | Age: 49
End: 2019-07-16

## 2019-07-16 NOTE — PROGRESS NOTES
On 7/16/2019 at 1039 this ONN called patient, no answer, household voicemail did not leave message at this time.

## 2019-07-18 RX ORDER — 0.9 % SODIUM CHLORIDE 0.9 %
VIAL (ML) INJECTION PRN
Status: CANCELLED | OUTPATIENT
Start: 2019-07-20

## 2019-07-18 RX ORDER — 0.9 % SODIUM CHLORIDE 0.9 %
VIAL (ML) INJECTION PRN
Status: CANCELLED | OUTPATIENT
Start: 2019-07-21

## 2019-07-18 RX ORDER — DEXTROSE MONOHYDRATE 50 MG/ML
INJECTION, SOLUTION INTRAVENOUS CONTINUOUS
Status: CANCELLED | OUTPATIENT
Start: 2019-07-21

## 2019-07-18 RX ORDER — 0.9 % SODIUM CHLORIDE 0.9 %
20 VIAL (ML) INJECTION PRN
Status: CANCELLED | OUTPATIENT
Start: 2019-07-22

## 2019-07-18 RX ORDER — ONDANSETRON 2 MG/ML
4 INJECTION INTRAMUSCULAR; INTRAVENOUS PRN
Status: CANCELLED | OUTPATIENT
Start: 2019-07-21

## 2019-07-18 RX ORDER — 0.9 % SODIUM CHLORIDE 0.9 %
5 VIAL (ML) INJECTION PRN
Status: CANCELLED | OUTPATIENT
Start: 2019-07-21

## 2019-07-18 RX ORDER — PROCHLORPERAZINE MALEATE 10 MG
10 TABLET ORAL EVERY 6 HOURS PRN
Status: CANCELLED | OUTPATIENT
Start: 2019-07-21

## 2019-07-18 RX ORDER — 0.9 % SODIUM CHLORIDE 0.9 %
5 VIAL (ML) INJECTION PRN
Status: CANCELLED | OUTPATIENT
Start: 2019-07-20

## 2019-07-18 RX ORDER — ONDANSETRON 8 MG/1
8 TABLET, ORALLY DISINTEGRATING ORAL PRN
Status: CANCELLED | OUTPATIENT
Start: 2019-07-20

## 2019-07-19 ENCOUNTER — APPOINTMENT (OUTPATIENT)
Dept: ONCOLOGY | Facility: MEDICAL CENTER | Age: 49
End: 2019-07-19
Attending: INTERNAL MEDICINE
Payer: MEDICARE

## 2019-07-20 ENCOUNTER — OUTPATIENT INFUSION SERVICES (OUTPATIENT)
Dept: ONCOLOGY | Facility: MEDICAL CENTER | Age: 49
End: 2019-07-20
Attending: INTERNAL MEDICINE
Payer: MEDICARE

## 2019-07-20 VITALS
DIASTOLIC BLOOD PRESSURE: 85 MMHG | WEIGHT: 145.94 LBS | HEIGHT: 67 IN | OXYGEN SATURATION: 100 % | HEART RATE: 84 BPM | TEMPERATURE: 97.3 F | BODY MASS INDEX: 22.91 KG/M2 | SYSTOLIC BLOOD PRESSURE: 135 MMHG | RESPIRATION RATE: 18 BRPM

## 2019-07-20 DIAGNOSIS — C16.2 MALIGNANT NEOPLASM OF BODY OF STOMACH (HCC): ICD-10-CM

## 2019-07-20 LAB
ALBUMIN SERPL BCP-MCNC: 3.7 G/DL (ref 3.2–4.9)
ALBUMIN/GLOB SERPL: 1.5 G/DL
ALP SERPL-CCNC: 45 U/L (ref 30–99)
ALT SERPL-CCNC: 17 U/L (ref 2–50)
ANION GAP SERPL CALC-SCNC: 8 MMOL/L (ref 0–11.9)
AST SERPL-CCNC: 19 U/L (ref 12–45)
BASOPHILS # BLD AUTO: 1.1 % (ref 0–1.8)
BASOPHILS # BLD: 0.03 K/UL (ref 0–0.12)
BILIRUB SERPL-MCNC: 0.3 MG/DL (ref 0.1–1.5)
BUN SERPL-MCNC: 16 MG/DL (ref 8–22)
CALCIUM SERPL-MCNC: 8.7 MG/DL (ref 8.5–10.5)
CHLORIDE SERPL-SCNC: 109 MMOL/L (ref 96–112)
CO2 SERPL-SCNC: 21 MMOL/L (ref 20–33)
CREAT SERPL-MCNC: 1.15 MG/DL (ref 0.5–1.4)
EOSINOPHIL # BLD AUTO: 0.03 K/UL (ref 0–0.51)
EOSINOPHIL NFR BLD: 1.1 % (ref 0–6.9)
ERYTHROCYTE [DISTWIDTH] IN BLOOD BY AUTOMATED COUNT: 44.1 FL (ref 35.9–50)
GLOBULIN SER CALC-MCNC: 2.4 G/DL (ref 1.9–3.5)
GLUCOSE SERPL-MCNC: 164 MG/DL (ref 65–99)
HCT VFR BLD AUTO: 31.7 % (ref 42–52)
HGB BLD-MCNC: 9.9 G/DL (ref 14–18)
IMM GRANULOCYTES # BLD AUTO: 0 K/UL (ref 0–0.11)
IMM GRANULOCYTES NFR BLD AUTO: 0 % (ref 0–0.9)
LYMPHOCYTES # BLD AUTO: 1.23 K/UL (ref 1–4.8)
LYMPHOCYTES NFR BLD: 45.9 % (ref 22–41)
MCH RBC QN AUTO: 29.4 PG (ref 27–33)
MCHC RBC AUTO-ENTMCNC: 31.2 G/DL (ref 33.7–35.3)
MCV RBC AUTO: 94.1 FL (ref 81.4–97.8)
MONOCYTES # BLD AUTO: 0.53 K/UL (ref 0–0.85)
MONOCYTES NFR BLD AUTO: 19.8 % (ref 0–13.4)
NEUTROPHILS # BLD AUTO: 0.86 K/UL (ref 1.82–7.42)
NEUTROPHILS NFR BLD: 32.1 % (ref 44–72)
NRBC # BLD AUTO: 0 K/UL
NRBC BLD-RTO: 0 /100 WBC
PLATELET # BLD AUTO: 277 K/UL (ref 164–446)
PMV BLD AUTO: 9.6 FL (ref 9–12.9)
POTASSIUM SERPL-SCNC: 3.5 MMOL/L (ref 3.6–5.5)
PROT SERPL-MCNC: 6.1 G/DL (ref 6–8.2)
RBC # BLD AUTO: 3.37 M/UL (ref 4.7–6.1)
SODIUM SERPL-SCNC: 138 MMOL/L (ref 135–145)
WBC # BLD AUTO: 2.7 K/UL (ref 4.8–10.8)

## 2019-07-20 PROCEDURE — 306780 HCHG STAT FOR TRANSFUSION PER CASE

## 2019-07-20 PROCEDURE — 700111 HCHG RX REV CODE 636 W/ 250 OVERRIDE (IP)

## 2019-07-20 PROCEDURE — A9270 NON-COVERED ITEM OR SERVICE: HCPCS | Performed by: INTERNAL MEDICINE

## 2019-07-20 PROCEDURE — 80053 COMPREHEN METABOLIC PANEL: CPT

## 2019-07-20 PROCEDURE — 36591 DRAW BLOOD OFF VENOUS DEVICE: CPT

## 2019-07-20 PROCEDURE — 700111 HCHG RX REV CODE 636 W/ 250 OVERRIDE (IP): Mod: JG | Performed by: INTERNAL MEDICINE

## 2019-07-20 PROCEDURE — 85025 COMPLETE CBC W/AUTO DIFF WBC: CPT

## 2019-07-20 PROCEDURE — 700111 HCHG RX REV CODE 636 W/ 250 OVERRIDE (IP): Performed by: INTERNAL MEDICINE

## 2019-07-20 PROCEDURE — A4212 NON CORING NEEDLE OR STYLET: HCPCS

## 2019-07-20 PROCEDURE — 96372 THER/PROPH/DIAG INJ SC/IM: CPT

## 2019-07-20 PROCEDURE — 700102 HCHG RX REV CODE 250 W/ 637 OVERRIDE(OP): Performed by: INTERNAL MEDICINE

## 2019-07-20 RX ORDER — ONDANSETRON 8 MG/1
8 TABLET, ORALLY DISINTEGRATING ORAL PRN
Status: CANCELLED | OUTPATIENT
Start: 2019-07-21

## 2019-07-20 RX ORDER — ONDANSETRON 8 MG/1
8 TABLET, ORALLY DISINTEGRATING ORAL PRN
Status: COMPLETED | OUTPATIENT
Start: 2019-07-20 | End: 2019-07-20

## 2019-07-20 RX ORDER — POTASSIUM CHLORIDE 20 MEQ/1
40 TABLET, EXTENDED RELEASE ORAL ONCE
Status: COMPLETED | OUTPATIENT
Start: 2019-07-20 | End: 2019-07-20

## 2019-07-20 RX ADMIN — POTASSIUM CHLORIDE 40 MEQ: 1500 TABLET, EXTENDED RELEASE ORAL at 09:57

## 2019-07-20 RX ADMIN — ONDANSETRON 8 MG: 8 TABLET, ORALLY DISINTEGRATING ORAL at 08:08

## 2019-07-20 RX ADMIN — HEPARIN 500 UNITS: 100 SYRINGE at 10:40

## 2019-07-20 RX ADMIN — FILGRASTIM 300 MCG: 300 INJECTION, SOLUTION INTRAVENOUS; SUBCUTANEOUS at 10:10

## 2019-07-20 NOTE — PROGRESS NOTES
"Pharmacy Chemotherapy Verification  **CHEMOTHERAPY DELAYED DUE TO LOW ANC; NEUPOGEN X 1 TODAY, REASSESS 7/21/19**    Patient name: Tima Rizzo  DX: Gastric cancer    Cycle: 2      Previous treatment = C1 7/5/19    Protocol: FLOT  DOCEtaxel 50 mg/m2 on Day 1  OXALIplatin 85 mg/m2 on Day 1  Leucovorin 200 mg/m2 on Day 1  Fluorouracil 2600 mg/m2 CIVI over 24 hours on Day 1  Every 14 days for 4 cycles preoperatively and 4 cycles postoperatively   NCCN Guidelines for Gastric Cancer v.1.2019  Yanira DORSEY, et al. Lancet Oncol. 2016 Dec;17(12):3293-5806.     Allergies:  Patient has no known allergies.    /85   Pulse 84   Temp 36.3 °C (97.3 °F) (Temporal)   Resp 18   Ht 1.7 m (5' 6.93\") Comment: 2 cm off for shoes  Wt 66.2 kg (145 lb 15.1 oz)   SpO2 100%   BMI 22.91 kg/m²   Body surface area is 1.77 meters squared.     Labs 7/20/19   Hgb 9.9 Plt 277k  SCr 1.15 CrCl 72.8 mL/min   AST/ALT/AP = 19/17/45 Tbili = 0.3            Martine Steiner, PharmD, BCOP        "

## 2019-07-20 NOTE — PROGRESS NOTES
into [facility] for Cycle 2 of Taxotere / Oxaliplatin / 5 FU / Labs for Gastric Cancer. Arrives feeling very nauseated, Zofran PO given with good results. Reports tolerating past treatments well. Port accessed in a sterile manner, had + blood return, flushed briskly. NO CHEMO given today due to low count, upon contacting Dr. De La Garza by RN in charge Alivia, orders received for Neupogen and patient to return tomorrow either for more Neupogen or to proceed with treatment ( see treatment plan ). Pt given Neupogen ( first dose ) SQ, well tolerated, observed for 30 min post injection. No S/S of reaction observed or expresses. Also potasium PO given per electrolyte replacement protocol as prescribed, tolerated well, denied having any complaints during or after treatment. Port had + blood return after, flushed per [facility] policy, left access for treatment tomorrow. Discharge home with mom in NAD. Appointment confirm for next treatment.

## 2019-07-21 ENCOUNTER — OUTPATIENT INFUSION SERVICES (OUTPATIENT)
Dept: ONCOLOGY | Facility: MEDICAL CENTER | Age: 49
End: 2019-07-21
Attending: INTERNAL MEDICINE
Payer: MEDICARE

## 2019-07-21 VITALS
TEMPERATURE: 98.4 F | WEIGHT: 142.2 LBS | HEIGHT: 66 IN | RESPIRATION RATE: 18 BRPM | DIASTOLIC BLOOD PRESSURE: 91 MMHG | SYSTOLIC BLOOD PRESSURE: 118 MMHG | HEART RATE: 78 BPM | OXYGEN SATURATION: 100 % | BODY MASS INDEX: 22.85 KG/M2

## 2019-07-21 DIAGNOSIS — C16.2 MALIGNANT NEOPLASM OF BODY OF STOMACH (HCC): ICD-10-CM

## 2019-07-21 LAB
ANISOCYTOSIS BLD QL SMEAR: ABNORMAL
BASOPHILS # BLD AUTO: 0 % (ref 0–1.8)
BASOPHILS # BLD: 0 K/UL (ref 0–0.12)
EOSINOPHIL # BLD AUTO: 0.29 K/UL (ref 0–0.51)
EOSINOPHIL NFR BLD: 2.7 % (ref 0–6.9)
ERYTHROCYTE [DISTWIDTH] IN BLOOD BY AUTOMATED COUNT: 43.4 FL (ref 35.9–50)
HCT VFR BLD AUTO: 33.3 % (ref 42–52)
HGB BLD-MCNC: 10.8 G/DL (ref 14–18)
LYMPHOCYTES # BLD AUTO: 3.66 K/UL (ref 1–4.8)
LYMPHOCYTES NFR BLD: 33.9 % (ref 22–41)
MACROCYTES BLD QL SMEAR: ABNORMAL
MANUAL DIFF BLD: NORMAL
MCH RBC QN AUTO: 30 PG (ref 27–33)
MCHC RBC AUTO-ENTMCNC: 32.4 G/DL (ref 33.7–35.3)
MCV RBC AUTO: 92.5 FL (ref 81.4–97.8)
METAMYELOCYTES NFR BLD MANUAL: 0.9 %
MONOCYTES # BLD AUTO: 1.16 K/UL (ref 0–0.85)
MONOCYTES NFR BLD AUTO: 10.7 % (ref 0–13.4)
MORPHOLOGY BLD-IMP: NORMAL
NEUTROPHILS # BLD AUTO: 5.59 K/UL (ref 1.82–7.42)
NEUTROPHILS NFR BLD: 42.9 % (ref 44–72)
NEUTS BAND NFR BLD MANUAL: 8.9 % (ref 0–10)
NRBC # BLD AUTO: 0.03 K/UL
NRBC BLD-RTO: 0.3 /100 WBC
PLATELET # BLD AUTO: 321 K/UL (ref 164–446)
PLATELET BLD QL SMEAR: NORMAL
PMV BLD AUTO: 9.4 FL (ref 9–12.9)
POLYCHROMASIA BLD QL SMEAR: NORMAL
RBC # BLD AUTO: 3.6 M/UL (ref 4.7–6.1)
RBC BLD AUTO: PRESENT
WBC # BLD AUTO: 10.8 K/UL (ref 4.8–10.8)

## 2019-07-21 PROCEDURE — G0498 CHEMO EXTEND IV INFUS W/PUMP: HCPCS

## 2019-07-21 PROCEDURE — 85027 COMPLETE CBC AUTOMATED: CPT

## 2019-07-21 PROCEDURE — 96417 CHEMO IV INFUS EACH ADDL SEQ: CPT

## 2019-07-21 PROCEDURE — 96413 CHEMO IV INFUSION 1 HR: CPT

## 2019-07-21 PROCEDURE — 700111 HCHG RX REV CODE 636 W/ 250 OVERRIDE (IP): Mod: JG

## 2019-07-21 PROCEDURE — 96368 THER/DIAG CONCURRENT INF: CPT

## 2019-07-21 PROCEDURE — 700105 HCHG RX REV CODE 258

## 2019-07-21 PROCEDURE — 700105 HCHG RX REV CODE 258: Performed by: INTERNAL MEDICINE

## 2019-07-21 PROCEDURE — 304540 HCHG NITRO SET VENT 2ND TUB

## 2019-07-21 PROCEDURE — 96415 CHEMO IV INFUSION ADDL HR: CPT

## 2019-07-21 PROCEDURE — 96375 TX/PRO/DX INJ NEW DRUG ADDON: CPT

## 2019-07-21 PROCEDURE — 85007 BL SMEAR W/DIFF WBC COUNT: CPT

## 2019-07-21 PROCEDURE — 700111 HCHG RX REV CODE 636 W/ 250 OVERRIDE (IP): Mod: JG | Performed by: INTERNAL MEDICINE

## 2019-07-21 RX ADMIN — DEXAMETHASONE SODIUM PHOSPHATE 12 MG: 4 INJECTION, SOLUTION INTRAMUSCULAR; INTRAVENOUS at 11:29

## 2019-07-21 RX ADMIN — OXALIPLATIN 147.05 MG: 100 INJECTION, SOLUTION, CONCENTRATE INTRAVENOUS at 13:55

## 2019-07-21 RX ADMIN — DOCETAXEL 86.6 MG: 20 INJECTION, SOLUTION, CONCENTRATE INTRAVENOUS at 12:12

## 2019-07-21 RX ADMIN — FLUOROURACIL 4500 MG: 50 INJECTION, SOLUTION INTRAVENOUS at 16:33

## 2019-07-21 RX ADMIN — ONDANSETRON HYDROCHLORIDE 16 MG: 2 SOLUTION INTRAMUSCULAR; INTRAVENOUS at 11:45

## 2019-07-21 RX ADMIN — LEUCOVORIN CALCIUM 346 MG: 350 INJECTION, POWDER, LYOPHILIZED, FOR SUSPENSION INTRAMUSCULAR; INTRAVENOUS at 13:55

## 2019-07-21 NOTE — PROGRESS NOTES
Chemotherapy Verification - PRIMARY RN      Height = 1.675 m  Weight = 64.5 kg  BSA = 1.73 m2       Medication: docetaxel  Dose: 50 mg/m2  Calculated Dose: 86.5 mg                             (In mg/m2, AUC, mg/kg)     Medication: oxaliplatin  Dose: 85 mg/m2  Calculated Dose: 147.05 mg                             (In mg/m2, AUC, mg/kg)    Medication: fluorouracil (CADD pump)  Dose: 2600 mg/m2  Calculated Dose: 4498 mg (to be given over 24 hours)                             (In mg/m2, AUC, mg/kg)      I confirm this process was performed independently with the BSA and all final chemotherapy dosing calculations congruent.  Any discrepancies of 10% or greater have been addressed with the chemotherapy pharmacist. The resolution of the discrepancy has been documented in the EPIC progress notes.

## 2019-07-21 NOTE — PROGRESS NOTES
"Pharmacy Chemotherapy Calculation:    Patient name: Tima Rizzo  DX: gastric cancer    Cycle: 2- delayed 1 day for low ANC      Previous treatment = 7/5/19    Protocol: FLOT  Docetaxel 50 mg/m2 on Day 1  Oxaliplatin 85 mg/m2 on Day 1  Leucovorin 200 mg/m2 on Day 1  Fluorouracil 2600 mg/m2 CIVI over 24 hours on Day 1  Every 14 days for 4 cycles preoperatively and 4 cycles postoperatively   NCCN Guidelines for Gastric Cancer v.1.2019  Al-Stuart SE, et al. Lancet Oncol. 2016 Dec;17(12):8006-4560.       Allergies:  Patient has no known allergies.    /91   Pulse 78   Temp 36.9 °C (98.4 °F) (Temporal)   Resp 18   Ht 1.675 m (5' 5.95\")   Wt 64.5 kg (142 lb 3.2 oz)   SpO2 100%   BMI 22.99 kg/m²   Body surface area is 1.73 meters squared.       Labs reviewed (7/20/19 & 7/21/19) within plan treatment parameters      Docetaxel 50 mg/m2 x 1.73m2 = 86.5mg   <10% difference, okay to treat with final dose = 86.6 mg    Oxaliplatin 85 mg/m2 x  1.73 m2 =  147 mg   <10% difference, okay to treat with final dose =  147.05  mg    Leucovorin 200 mg/m2 x 1.73m2 = 346 mg   <10% difference, okay to treat with final dose = 346mg    Fluorouracil 2600 mg/m2  x 1.73m2 = 4498mg   <10% difference, okay to treat with final dose = 4500mg in CADD pump for home infusion    JUSTINE Steve Pharm.D.        "

## 2019-07-21 NOTE — PROGRESS NOTES
Pt ambulatory w/ his mom for repeat CBC w/ poss C2 of FLOT for gastric cancer.  Pt w/ no s/s of infection, pt has no complaints at this time.  Pt was seen yesterday for C2 but was found to be neutropenic and so C2 was held and neupogen injection given instead.  PORT still accessed from yesterday, positive blood return noted, CBC drawn per order, flushed per protocol.  Pt lab results w/n parameters for tx today, ANC of 5590.  Pre-meds and chemos infused w/ no adverse reactions.  PORT w/ brisk blood return post-chemo infusion, flushed per protocol.  CADD pump connected to PORT, all clamps open, confirmed to be in RUN mode, pump placed in pt's sabina-pack w/ extra batteries.  Pt left on foot in care of his mom in NAD.  Confirmed pt's disconnect appt for 7-22.

## 2019-07-21 NOTE — PROGRESS NOTES
"Pharmacy Chemotherapy Verification    Patient Name: Tima Rizzo   Dx: Malignant neoplasm of the fundus    Protocol: Fluorouracil/Leucovorin/OXALIplatin/DOCEtaxel (FLOT)       *Dosing Reference*  DOCEtaxel 50 mg/m2 IV over 60 minutes on Day 1  OXALIplatin 85 mg/m2 IV over 2 hours on Day 1 concurrent with   Leucovorin 200 mg/m2 IV over 2 hours on Day 1 followed by  Fluorouracil 2600 mg/m2 IV continuous infusion over 24 hours on Day 1  14 day cycle for 4 cycles preoperatively and 4 cycles postoperatively for a total of 8 cycles  NCCN Guidelines for Gastric Cancer.V.1.2019  Yanira DORSEY, et al. Lancet Oncol. 2016;178(12):1529-7892    Allergies:  Patient has no known allergies.       /91   Pulse 78   Temp 36.9 °C (98.4 °F) (Temporal)   Resp 18   Ht 1.675 m (5' 5.95\")   Wt 64.5 kg (142 lb 3.2 oz)   SpO2 100%   BMI 22.99 kg/m²  Body surface area is 1.73 meters squared.    Labs 07/21/19:  ANC~ 6000 Hgb = 10.8 Plt = 321k     Labs 07/20/19:  SCr = 0.99 mg/dL CrCl ~72 mL/min  AST/ALT/AP = 19/17/45 Tbili = 0.3     Drug Order   (Drug name, dose, route, IV Fluid & volume, frequency, number of doses) Cycle 2      Previous treatment: C1 = 07/05/19     Medication = DOCEtaxel (Taxotere)  Base Dose = 50 mg/m2  Calc Dose: Base Dose x 1.73 m2 = 86.5 mg  Final Dose = 86.6 mg  Route = IV  Fluid & Volume =  mL  Admin Duration = Over 60 minutes          <10% difference, OK to treat with final dose    Medication = OXALIplatin (Eloxatin)   Base Dose = 85 mg/m2  Calc Dose: Base Dose x 1.73 m2 = 147 mg  Final Dose = 147.05 mg  Route = IV  Fluid & Volume = D5W 250 mL  Admin Duration = Over 120 minutes   Run concurrently with leucovorin        <10% difference, OK to treat with final dose    Medication = Leucovorin   Base Dose = 200 mg/m2  Calc Dose:Base Dose x 1.73 m2 = 346 mg  Final Dose = 346 mg  Route = IV  Fluid & Volume = D5W 250 mL  Admin Duration = Over 120 minutes   Run concurrently with oxaliplatin       <10% " difference, OK to treat with final dose    Medication = Fluorouracil (5-FU)  Base Dose = 2600 mg/m2  Calc Dose: Base Dose x 1.73 m2 = 4498 mg  Final Dose = 4500 mg  Route = IV  Fluid & Volume = 50 mg/mL; 90 mL (+ 3 mLs)  Admin Duration = Over 24 hours @ 3.8 mL/hr          <10% difference, OK to treat with final dose      By my signature below, I confirm this process was performed independently with the BSA and all final chemotherapy dosing calculations congruent. I have reviewed the above chemotherapy order and that my calculation of the final dose and BSA (when applicable) corroborate those calculations of the  pharmacist. Discrepancies of 10% or greater in the written dose have been addressed and documented within the EPIC Progress notes.    Tashi Acharya, PharmD, BCOP

## 2019-07-21 NOTE — PROGRESS NOTES
Chemotherapy Verification - SECONDARY RN       Height = 65.95in  Weight = 64.5kg  BSA = 1.73m2       Medication: Taxotere  Dose: 50mg/m2  Calculated Dose: 86.5mg                             (In mg/m2, AUC, mg/kg)     Medication: Oxaliplatin  Dose: 85mg/m2  Calculated Dose: 147.05mg                             (In mg/m2, AUC, mg/kg)    Medication: Leucovorin  Dose: 200mg/m2  Calculated Dose: 346mg                             (In mg/m2, AUC, mg/kg)    Medication: Fluorouracil  Dose: 2600mg/m2  Calculated Dose: 4498mg via continuous pump over 24 hours                             (In mg/m2, AUC, mg/kg)      I confirm that this process was performed independently.

## 2019-07-22 ENCOUNTER — OUTPATIENT INFUSION SERVICES (OUTPATIENT)
Dept: ONCOLOGY | Facility: MEDICAL CENTER | Age: 49
End: 2019-07-22
Attending: INTERNAL MEDICINE
Payer: MEDICARE

## 2019-07-22 VITALS
TEMPERATURE: 98.4 F | BODY MASS INDEX: 22.75 KG/M2 | RESPIRATION RATE: 18 BRPM | HEART RATE: 83 BPM | OXYGEN SATURATION: 95 % | SYSTOLIC BLOOD PRESSURE: 144 MMHG | HEIGHT: 66 IN | WEIGHT: 141.54 LBS | DIASTOLIC BLOOD PRESSURE: 91 MMHG

## 2019-07-22 PROCEDURE — 700111 HCHG RX REV CODE 636 W/ 250 OVERRIDE (IP)

## 2019-07-22 PROCEDURE — 96523 IRRIG DRUG DELIVERY DEVICE: CPT

## 2019-07-22 RX ADMIN — HEPARIN 500 UNITS: 100 SYRINGE at 17:04

## 2019-07-23 NOTE — PROGRESS NOTES
Pt returns to [facility] for 5FU CADD pump disconnect.  Pt reports no nausea or other complaints today.  CADD pump infusion is complete.  Pump disconnected and R chest port flushed with NS.  Blood return noted to port and was heparin locked.  Lopez needle removed intact.  Site covered with gauze and paper tape.  Pt dc home to self care.

## 2019-07-24 RX ORDER — 0.9 % SODIUM CHLORIDE 0.9 %
VIAL (ML) INJECTION PRN
Status: CANCELLED | OUTPATIENT
Start: 2019-08-10

## 2019-07-24 RX ORDER — 0.9 % SODIUM CHLORIDE 0.9 %
VIAL (ML) INJECTION PRN
Status: CANCELLED | OUTPATIENT
Start: 2019-08-09

## 2019-07-24 RX ORDER — ONDANSETRON 2 MG/ML
4 INJECTION INTRAMUSCULAR; INTRAVENOUS PRN
Status: CANCELLED | OUTPATIENT
Start: 2019-08-10

## 2019-07-24 RX ORDER — DEXTROSE MONOHYDRATE 50 MG/ML
INJECTION, SOLUTION INTRAVENOUS CONTINUOUS
Status: CANCELLED | OUTPATIENT
Start: 2019-08-10

## 2019-07-24 RX ORDER — ONDANSETRON 8 MG/1
8 TABLET, ORALLY DISINTEGRATING ORAL PRN
Status: CANCELLED | OUTPATIENT
Start: 2019-08-10

## 2019-07-24 RX ORDER — 0.9 % SODIUM CHLORIDE 0.9 %
5 VIAL (ML) INJECTION PRN
Status: CANCELLED | OUTPATIENT
Start: 2019-08-09

## 2019-07-24 RX ORDER — 0.9 % SODIUM CHLORIDE 0.9 %
5 VIAL (ML) INJECTION PRN
Status: CANCELLED | OUTPATIENT
Start: 2019-08-10

## 2019-07-24 RX ORDER — 0.9 % SODIUM CHLORIDE 0.9 %
20 VIAL (ML) INJECTION PRN
Status: CANCELLED | OUTPATIENT
Start: 2019-08-11

## 2019-07-24 RX ORDER — PROCHLORPERAZINE MALEATE 10 MG
10 TABLET ORAL EVERY 6 HOURS PRN
Status: CANCELLED | OUTPATIENT
Start: 2019-08-10

## 2019-07-31 ENCOUNTER — HOSPITAL ENCOUNTER (INPATIENT)
Facility: MEDICAL CENTER | Age: 49
LOS: 1 days | DRG: 809 | End: 2019-08-01
Attending: EMERGENCY MEDICINE | Admitting: HOSPITALIST
Payer: MEDICARE

## 2019-07-31 ENCOUNTER — APPOINTMENT (OUTPATIENT)
Dept: RADIOLOGY | Facility: MEDICAL CENTER | Age: 49
DRG: 809 | End: 2019-07-31
Payer: MEDICARE

## 2019-07-31 ENCOUNTER — APPOINTMENT (OUTPATIENT)
Dept: RADIOLOGY | Facility: MEDICAL CENTER | Age: 49
DRG: 809 | End: 2019-07-31
Attending: EMERGENCY MEDICINE
Payer: MEDICARE

## 2019-07-31 DIAGNOSIS — R50.81 NEUTROPENIA WITH FEVER (HCC): ICD-10-CM

## 2019-07-31 DIAGNOSIS — D70.9 NEUTROPENIA WITH FEVER (HCC): ICD-10-CM

## 2019-07-31 DIAGNOSIS — T45.1X5A CHEMOTHERAPY-INDUCED NEUTROPENIA (HCC): ICD-10-CM

## 2019-07-31 DIAGNOSIS — D70.1 CHEMOTHERAPY-INDUCED NEUTROPENIA (HCC): ICD-10-CM

## 2019-07-31 PROBLEM — E87.1 HYPONATREMIA: Status: ACTIVE | Noted: 2019-07-31

## 2019-07-31 LAB
ALBUMIN SERPL BCP-MCNC: 3.3 G/DL (ref 3.2–4.9)
ALBUMIN/GLOB SERPL: 1.3 G/DL
ALP SERPL-CCNC: 40 U/L (ref 30–99)
ALT SERPL-CCNC: 16 U/L (ref 2–50)
ANION GAP SERPL CALC-SCNC: 7 MMOL/L (ref 0–11.9)
APPEARANCE UR: CLEAR
APTT PPP: 40.5 SEC (ref 24.7–36)
AST SERPL-CCNC: 20 U/L (ref 12–45)
BASOPHILS # BLD AUTO: 1 % (ref 0–1.8)
BASOPHILS # BLD: 0.02 K/UL (ref 0–0.12)
BILIRUB SERPL-MCNC: 0.6 MG/DL (ref 0.1–1.5)
BILIRUB UR QL STRIP.AUTO: NEGATIVE
BUN SERPL-MCNC: 15 MG/DL (ref 8–22)
CALCIUM SERPL-MCNC: 8.2 MG/DL (ref 8.4–10.2)
CHLORIDE SERPL-SCNC: 102 MMOL/L (ref 96–112)
CO2 SERPL-SCNC: 24 MMOL/L (ref 20–33)
COLOR UR: YELLOW
CREAT SERPL-MCNC: 1.25 MG/DL (ref 0.5–1.4)
EOSINOPHIL # BLD AUTO: 0.02 K/UL (ref 0–0.51)
EOSINOPHIL NFR BLD: 1 % (ref 0–6.9)
ERYTHROCYTE [DISTWIDTH] IN BLOOD BY AUTOMATED COUNT: 42.6 FL (ref 35.9–50)
GLOBULIN SER CALC-MCNC: 2.6 G/DL (ref 1.9–3.5)
GLUCOSE SERPL-MCNC: 126 MG/DL (ref 65–99)
GLUCOSE UR STRIP.AUTO-MCNC: NEGATIVE MG/DL
HCT VFR BLD AUTO: 27.8 % (ref 42–52)
HGB BLD-MCNC: 9.1 G/DL (ref 14–18)
INR PPP: 1.05 (ref 0.87–1.13)
KETONES UR STRIP.AUTO-MCNC: NEGATIVE MG/DL
LACTATE BLD-SCNC: 0.7 MMOL/L (ref 0.5–2)
LACTATE BLD-SCNC: 1.4 MMOL/L (ref 0.5–2)
LEUKOCYTE ESTERASE UR QL STRIP.AUTO: NEGATIVE
LYMPHOCYTES # BLD AUTO: 1.13 K/UL (ref 1–4.8)
LYMPHOCYTES NFR BLD: 75 % (ref 22–41)
MANUAL DIFF BLD: NORMAL
MCH RBC QN AUTO: 29.8 PG (ref 27–33)
MCHC RBC AUTO-ENTMCNC: 32.7 G/DL (ref 33.7–35.3)
MCV RBC AUTO: 91.1 FL (ref 81.4–97.8)
METAMYELOCYTES NFR BLD MANUAL: 1 %
MICRO URNS: NORMAL
MONOCYTES # BLD AUTO: 0.26 K/UL (ref 0–0.85)
MONOCYTES NFR BLD AUTO: 17 % (ref 0–13.4)
NEUTROPHILS # BLD AUTO: 0.08 K/UL (ref 1.82–7.42)
NEUTROPHILS NFR BLD: 4 % (ref 44–72)
NEUTS BAND NFR BLD MANUAL: 1 % (ref 0–10)
NITRITE UR QL STRIP.AUTO: NEGATIVE
NRBC # BLD AUTO: 0 K/UL
NRBC BLD-RTO: 0 /100 WBC
PH UR STRIP.AUTO: 5.5 [PH] (ref 5–8)
PLATELET # BLD AUTO: 192 K/UL (ref 164–446)
PLATELET BLD QL SMEAR: NORMAL
PMV BLD AUTO: 10.1 FL (ref 9–12.9)
POTASSIUM SERPL-SCNC: 4 MMOL/L (ref 3.6–5.5)
PROT SERPL-MCNC: 5.9 G/DL (ref 6–8.2)
PROT UR QL STRIP: NEGATIVE MG/DL
PROTHROMBIN TIME: 13.8 SEC (ref 12–14.6)
RBC # BLD AUTO: 3.05 M/UL (ref 4.7–6.1)
RBC BLD AUTO: NORMAL
RBC UR QL AUTO: NEGATIVE
SODIUM SERPL-SCNC: 133 MMOL/L (ref 135–145)
SP GR UR STRIP.AUTO: 1.02
VARIANT LYMPHS BLD QL SMEAR: NORMAL
WBC # BLD AUTO: 1.5 K/UL (ref 4.8–10.8)

## 2019-07-31 PROCEDURE — 71045 X-RAY EXAM CHEST 1 VIEW: CPT

## 2019-07-31 PROCEDURE — 80053 COMPREHEN METABOLIC PANEL: CPT

## 2019-07-31 PROCEDURE — 96367 TX/PROPH/DG ADDL SEQ IV INF: CPT

## 2019-07-31 PROCEDURE — 700105 HCHG RX REV CODE 258: Performed by: EMERGENCY MEDICINE

## 2019-07-31 PROCEDURE — 87086 URINE CULTURE/COLONY COUNT: CPT

## 2019-07-31 PROCEDURE — A9270 NON-COVERED ITEM OR SERVICE: HCPCS | Performed by: HOSPITALIST

## 2019-07-31 PROCEDURE — 87040 BLOOD CULTURE FOR BACTERIA: CPT

## 2019-07-31 PROCEDURE — 85027 COMPLETE CBC AUTOMATED: CPT

## 2019-07-31 PROCEDURE — 700102 HCHG RX REV CODE 250 W/ 637 OVERRIDE(OP): Performed by: HOSPITALIST

## 2019-07-31 PROCEDURE — 99223 1ST HOSP IP/OBS HIGH 75: CPT | Mod: AI | Performed by: HOSPITALIST

## 2019-07-31 PROCEDURE — 83605 ASSAY OF LACTIC ACID: CPT

## 2019-07-31 PROCEDURE — 85730 THROMBOPLASTIN TIME PARTIAL: CPT

## 2019-07-31 PROCEDURE — 770020 HCHG ROOM/CARE - TELE (206)

## 2019-07-31 PROCEDURE — 700111 HCHG RX REV CODE 636 W/ 250 OVERRIDE (IP): Performed by: EMERGENCY MEDICINE

## 2019-07-31 PROCEDURE — 700112 HCHG RX REV CODE 229: Performed by: HOSPITALIST

## 2019-07-31 PROCEDURE — 81003 URINALYSIS AUTO W/O SCOPE: CPT

## 2019-07-31 PROCEDURE — 36415 COLL VENOUS BLD VENIPUNCTURE: CPT

## 2019-07-31 PROCEDURE — 85007 BL SMEAR W/DIFF WBC COUNT: CPT

## 2019-07-31 PROCEDURE — 85610 PROTHROMBIN TIME: CPT

## 2019-07-31 PROCEDURE — 96365 THER/PROPH/DIAG IV INF INIT: CPT

## 2019-07-31 PROCEDURE — 99285 EMERGENCY DEPT VISIT HI MDM: CPT

## 2019-07-31 RX ORDER — DOCUSATE SODIUM 100 MG/1
100 CAPSULE, LIQUID FILLED ORAL 2 TIMES DAILY
Status: DISCONTINUED | OUTPATIENT
Start: 2019-07-31 | End: 2019-08-01 | Stop reason: HOSPADM

## 2019-07-31 RX ORDER — OXYCODONE HYDROCHLORIDE 5 MG/1
10 TABLET ORAL
Status: DISCONTINUED | OUTPATIENT
Start: 2019-07-31 | End: 2019-08-01 | Stop reason: HOSPADM

## 2019-07-31 RX ORDER — LISINOPRIL 5 MG/1
10 TABLET ORAL DAILY
Status: DISCONTINUED | OUTPATIENT
Start: 2019-08-01 | End: 2019-08-01 | Stop reason: HOSPADM

## 2019-07-31 RX ORDER — OMEPRAZOLE 20 MG/1
40 CAPSULE, DELAYED RELEASE ORAL DAILY
Status: DISCONTINUED | OUTPATIENT
Start: 2019-08-01 | End: 2019-08-01 | Stop reason: HOSPADM

## 2019-07-31 RX ORDER — PROMETHAZINE HYDROCHLORIDE 25 MG/1
12.5 TABLET ORAL EVERY 6 HOURS PRN
Status: DISCONTINUED | OUTPATIENT
Start: 2019-07-31 | End: 2019-08-01 | Stop reason: HOSPADM

## 2019-07-31 RX ORDER — OXYCODONE HYDROCHLORIDE 5 MG/1
TABLET ORAL
Status: DISPENSED
Start: 2019-07-31 | End: 2019-08-01

## 2019-07-31 RX ORDER — POLYETHYLENE GLYCOL 3350 17 G/17G
1 POWDER, FOR SOLUTION ORAL
Status: DISCONTINUED | OUTPATIENT
Start: 2019-07-31 | End: 2019-08-01 | Stop reason: HOSPADM

## 2019-07-31 RX ORDER — MORPHINE SULFATE 15 MG/1
15 TABLET ORAL EVERY 4 HOURS PRN
Status: DISCONTINUED | OUTPATIENT
Start: 2019-07-31 | End: 2019-08-01 | Stop reason: HOSPADM

## 2019-07-31 RX ORDER — OXYCODONE HCL 10 MG/1
TABLET, FILM COATED, EXTENDED RELEASE ORAL
Status: COMPLETED
Start: 2019-07-31 | End: 2019-07-31

## 2019-07-31 RX ORDER — SODIUM CHLORIDE 9 MG/ML
INJECTION, SOLUTION INTRAVENOUS CONTINUOUS
Status: DISCONTINUED | OUTPATIENT
Start: 2019-07-31 | End: 2019-08-01 | Stop reason: HOSPADM

## 2019-07-31 RX ORDER — ACETAMINOPHEN 325 MG/1
650 TABLET ORAL EVERY 6 HOURS PRN
Status: DISCONTINUED | OUTPATIENT
Start: 2019-07-31 | End: 2019-08-01 | Stop reason: HOSPADM

## 2019-07-31 RX ORDER — ONDANSETRON 4 MG/1
8 TABLET, ORALLY DISINTEGRATING ORAL EVERY 8 HOURS PRN
Status: DISCONTINUED | OUTPATIENT
Start: 2019-07-31 | End: 2019-08-01 | Stop reason: HOSPADM

## 2019-07-31 RX ORDER — SODIUM CHLORIDE 9 MG/ML
500 INJECTION, SOLUTION INTRAVENOUS
Status: DISCONTINUED | OUTPATIENT
Start: 2019-07-31 | End: 2019-08-01 | Stop reason: HOSPADM

## 2019-07-31 RX ORDER — BISACODYL 10 MG
10 SUPPOSITORY, RECTAL RECTAL
Status: DISCONTINUED | OUTPATIENT
Start: 2019-07-31 | End: 2019-08-01 | Stop reason: HOSPADM

## 2019-07-31 RX ORDER — NALOXONE HYDROCHLORIDE 4 MG/.1ML
1 SPRAY NASAL PRN
Status: DISCONTINUED | OUTPATIENT
Start: 2019-07-31 | End: 2019-07-31

## 2019-07-31 RX ORDER — AMOXICILLIN 250 MG
2 CAPSULE ORAL 2 TIMES DAILY
Status: DISCONTINUED | OUTPATIENT
Start: 2019-07-31 | End: 2019-08-01 | Stop reason: HOSPADM

## 2019-07-31 RX ADMIN — PROMETHAZINE HYDROCHLORIDE 12.5 MG: 25 TABLET ORAL at 21:31

## 2019-07-31 RX ADMIN — CEFEPIME 2 G: 2 INJECTION, POWDER, FOR SOLUTION INTRAVENOUS at 18:31

## 2019-07-31 RX ADMIN — DOCUSATE SODIUM 100 MG: 100 CAPSULE, LIQUID FILLED ORAL at 21:27

## 2019-07-31 RX ADMIN — VANCOMYCIN 1500 MG: 1 INJECTION, SOLUTION INTRAVENOUS at 19:28

## 2019-07-31 RX ADMIN — SENNOSIDES,DOCUSATE SODIUM 2 TABLET: 8.6; 5 TABLET, FILM COATED ORAL at 21:20

## 2019-07-31 RX ADMIN — OXYCODONE HYDROCHLORIDE 10 MG: 5 TABLET ORAL at 19:28

## 2019-07-31 RX ADMIN — MORPHINE SULFATE 15 MG: 15 TABLET ORAL at 21:31

## 2019-07-31 ASSESSMENT — PATIENT HEALTH QUESTIONNAIRE - PHQ9
2. FEELING DOWN, DEPRESSED, IRRITABLE, OR HOPELESS: NOT AT ALL
SUM OF ALL RESPONSES TO PHQ9 QUESTIONS 1 AND 2: 0
1. LITTLE INTEREST OR PLEASURE IN DOING THINGS: NOT AT ALL

## 2019-07-31 ASSESSMENT — ENCOUNTER SYMPTOMS
STRIDOR: 0
DOUBLE VISION: 0
SPEECH CHANGE: 0
CHILLS: 1
TREMORS: 0
CONSTIPATION: 0
HEADACHES: 0
EYE PAIN: 0
ORTHOPNEA: 0
NERVOUS/ANXIOUS: 1
HEMOPTYSIS: 0
MYALGIAS: 0
SPUTUM PRODUCTION: 0
PALPITATIONS: 0
FEVER: 0
SHORTNESS OF BREATH: 0
PND: 0
NECK PAIN: 0
CLAUDICATION: 0
DIZZINESS: 0
SENSORY CHANGE: 0
MEMORY LOSS: 0
BACK PAIN: 0
NAUSEA: 0
BLOOD IN STOOL: 0
PHOTOPHOBIA: 0
HEARTBURN: 0
COUGH: 0
VOMITING: 0
TINGLING: 0
DEPRESSION: 0
BLURRED VISION: 0
WEAKNESS: 0
SORE THROAT: 0

## 2019-07-31 NOTE — ED TRIAGE NOTES
"Chief Complaint   Patient presents with   • Fever   • Fatigue     Pt reports that he was at his oncologist this morning. Has stomach CA. Chemo scheduled for Saturday. States they told him his white count was low. Went home developed fever, HA, increased fatigue, loss of appetite. Sepsis score 3, neutropenic score 3. Orders placed. Charge RN notified. Pt back for blood draw.   /53   Pulse 94   Temp (!) 38.5 °C (101.3 °F) (Oral)   Resp 18   Ht 1.702 m (5' 7\")   Wt 64.2 kg (141 lb 8.6 oz)   SpO2 98%   Pt informed of wait times. Educated on triage process.  Asked to return to triage RN for any new or worsening of symptoms. Thanked for patience.        "

## 2019-08-01 ENCOUNTER — PATIENT OUTREACH (OUTPATIENT)
Dept: HEALTH INFORMATION MANAGEMENT | Facility: OTHER | Age: 49
End: 2019-08-01

## 2019-08-01 VITALS
SYSTOLIC BLOOD PRESSURE: 123 MMHG | OXYGEN SATURATION: 97 % | HEIGHT: 67 IN | RESPIRATION RATE: 18 BRPM | TEMPERATURE: 97.9 F | WEIGHT: 142.64 LBS | DIASTOLIC BLOOD PRESSURE: 72 MMHG | HEART RATE: 74 BPM | BODY MASS INDEX: 22.39 KG/M2

## 2019-08-01 LAB
ALBUMIN SERPL BCP-MCNC: 2.6 G/DL (ref 3.2–4.9)
ALBUMIN/GLOB SERPL: 1.2 G/DL
ALP SERPL-CCNC: 34 U/L (ref 30–99)
ALT SERPL-CCNC: 12 U/L (ref 2–50)
ANION GAP SERPL CALC-SCNC: 5 MMOL/L (ref 0–11.9)
ANISOCYTOSIS BLD QL SMEAR: ABNORMAL
AST SERPL-CCNC: 16 U/L (ref 12–45)
BASOPHILS # BLD AUTO: 0 % (ref 0–1.8)
BASOPHILS # BLD: 0 K/UL (ref 0–0.12)
BILIRUB SERPL-MCNC: 0.7 MG/DL (ref 0.1–1.5)
BUN SERPL-MCNC: 10 MG/DL (ref 8–22)
CALCIUM SERPL-MCNC: 7.6 MG/DL (ref 8.4–10.2)
CHLORIDE SERPL-SCNC: 106 MMOL/L (ref 96–112)
CO2 SERPL-SCNC: 23 MMOL/L (ref 20–33)
CREAT SERPL-MCNC: 1.06 MG/DL (ref 0.5–1.4)
EOSINOPHIL # BLD AUTO: 0.08 K/UL (ref 0–0.51)
EOSINOPHIL NFR BLD: 3 % (ref 0–6.9)
ERYTHROCYTE [DISTWIDTH] IN BLOOD BY AUTOMATED COUNT: 42.6 FL (ref 35.9–50)
GLOBULIN SER CALC-MCNC: 2.2 G/DL (ref 1.9–3.5)
GLUCOSE SERPL-MCNC: 87 MG/DL (ref 65–99)
HCT VFR BLD AUTO: 25.8 % (ref 42–52)
HGB BLD-MCNC: 8.5 G/DL (ref 14–18)
LYMPHOCYTES # BLD AUTO: 2.2 K/UL (ref 1–4.8)
LYMPHOCYTES NFR BLD: 88 % (ref 22–41)
MANUAL DIFF BLD: NORMAL
MCH RBC QN AUTO: 29.6 PG (ref 27–33)
MCHC RBC AUTO-ENTMCNC: 32.9 G/DL (ref 33.7–35.3)
MCV RBC AUTO: 89.9 FL (ref 81.4–97.8)
MONOCYTES # BLD AUTO: 0.2 K/UL (ref 0–0.85)
MONOCYTES NFR BLD AUTO: 8 % (ref 0–13.4)
NEUTROPHILS # BLD AUTO: 0.03 K/UL (ref 1.82–7.42)
NEUTROPHILS NFR BLD: 1 % (ref 44–72)
NRBC # BLD AUTO: 0 K/UL
NRBC BLD-RTO: 0 /100 WBC
PLATELET # BLD AUTO: 167 K/UL (ref 164–446)
PLATELET BLD QL SMEAR: NORMAL
PMV BLD AUTO: 9.7 FL (ref 9–12.9)
POTASSIUM SERPL-SCNC: 3.9 MMOL/L (ref 3.6–5.5)
PROT SERPL-MCNC: 4.8 G/DL (ref 6–8.2)
RBC # BLD AUTO: 2.87 M/UL (ref 4.7–6.1)
RBC BLD AUTO: PRESENT
SODIUM SERPL-SCNC: 134 MMOL/L (ref 135–145)
WBC # BLD AUTO: 2.5 K/UL (ref 4.8–10.8)

## 2019-08-01 PROCEDURE — 80053 COMPREHEN METABOLIC PANEL: CPT

## 2019-08-01 PROCEDURE — 700112 HCHG RX REV CODE 229: Performed by: HOSPITALIST

## 2019-08-01 PROCEDURE — A9270 NON-COVERED ITEM OR SERVICE: HCPCS | Performed by: HOSPITALIST

## 2019-08-01 PROCEDURE — 85027 COMPLETE CBC AUTOMATED: CPT

## 2019-08-01 PROCEDURE — 85007 BL SMEAR W/DIFF WBC COUNT: CPT

## 2019-08-01 PROCEDURE — 700102 HCHG RX REV CODE 250 W/ 637 OVERRIDE(OP): Performed by: HOSPITALIST

## 2019-08-01 PROCEDURE — 700105 HCHG RX REV CODE 258: Performed by: HOSPITALIST

## 2019-08-01 PROCEDURE — 700111 HCHG RX REV CODE 636 W/ 250 OVERRIDE (IP): Performed by: HOSPITALIST

## 2019-08-01 PROCEDURE — 99239 HOSP IP/OBS DSCHRG MGMT >30: CPT | Performed by: INTERNAL MEDICINE

## 2019-08-01 PROCEDURE — 700111 HCHG RX REV CODE 636 W/ 250 OVERRIDE (IP)

## 2019-08-01 RX ORDER — AMOXICILLIN AND CLAVULANATE POTASSIUM 875; 125 MG/1; MG/1
1 TABLET, FILM COATED ORAL 2 TIMES DAILY
Qty: 14 TAB | Refills: 0 | Status: SHIPPED | OUTPATIENT
Start: 2019-08-01 | End: 2019-08-08

## 2019-08-01 RX ORDER — ACETAMINOPHEN 325 MG/1
650 TABLET ORAL EVERY 6 HOURS PRN
Qty: 30 TAB | Refills: 0 | Status: SHIPPED | OUTPATIENT
Start: 2019-08-01 | End: 2019-08-14

## 2019-08-01 RX ADMIN — OXYCODONE HYDROCHLORIDE 10 MG: 5 TABLET ORAL at 00:16

## 2019-08-01 RX ADMIN — OXYCODONE HYDROCHLORIDE 10 MG: 5 TABLET ORAL at 08:53

## 2019-08-01 RX ADMIN — POLYETHYLENE GLYCOL 3350 1 PACKET: 17 POWDER, FOR SOLUTION ORAL at 10:07

## 2019-08-01 RX ADMIN — VANCOMYCIN HYDROCHLORIDE 1000 MG: 1 INJECTION, POWDER, LYOPHILIZED, FOR SOLUTION INTRAVENOUS at 01:17

## 2019-08-01 RX ADMIN — VANCOMYCIN 750 MG: 1 INJECTION, SOLUTION INTRAVENOUS at 12:33

## 2019-08-01 RX ADMIN — ACETAMINOPHEN 650 MG: 325 TABLET, FILM COATED ORAL at 08:30

## 2019-08-01 RX ADMIN — CEFEPIME 2 G: 2 INJECTION, POWDER, FOR SOLUTION INTRAVENOUS at 10:08

## 2019-08-01 RX ADMIN — DOCUSATE SODIUM 100 MG: 100 CAPSULE, LIQUID FILLED ORAL at 05:38

## 2019-08-01 RX ADMIN — PROMETHAZINE HYDROCHLORIDE 12.5 MG: 25 TABLET ORAL at 08:53

## 2019-08-01 RX ADMIN — LISINOPRIL 10 MG: 5 TABLET ORAL at 05:38

## 2019-08-01 RX ADMIN — CEFEPIME 2 G: 2 INJECTION, POWDER, FOR SOLUTION INTRAVENOUS at 01:18

## 2019-08-01 RX ADMIN — SODIUM CHLORIDE: 9 INJECTION, SOLUTION INTRAVENOUS at 00:16

## 2019-08-01 RX ADMIN — OMEPRAZOLE 40 MG: 20 CAPSULE, DELAYED RELEASE ORAL at 05:38

## 2019-08-01 RX ADMIN — OXYCODONE HYDROCHLORIDE 10 MG: 5 TABLET ORAL at 13:33

## 2019-08-01 RX ADMIN — SENNOSIDES,DOCUSATE SODIUM 2 TABLET: 8.6; 5 TABLET, FILM COATED ORAL at 05:38

## 2019-08-01 RX ADMIN — ONDANSETRON 8 MG: 4 TABLET, ORALLY DISINTEGRATING ORAL at 00:38

## 2019-08-01 ASSESSMENT — COGNITIVE AND FUNCTIONAL STATUS - GENERAL
MOBILITY SCORE: 24
DAILY ACTIVITIY SCORE: 24
SUGGESTED CMS G CODE MODIFIER MOBILITY: CH
SUGGESTED CMS G CODE MODIFIER DAILY ACTIVITY: CH

## 2019-08-01 NOTE — DISCHARGE SUMMARY
Discharge Summary    CHIEF COMPLAINT ON ADMISSION  Chief Complaint   Patient presents with   • Fever   • Fatigue       Reason for Admission  Fever;Fatigue     Admission Date  7/31/2019    CODE STATUS  Full Code    HPI & HOSPITAL COURSE  This is a 48 y.o. male with a history of gastric cancer who recently started chemotherapy here with fever found to have neutropenia consistent with neutropenic fever.  He was given Granix and started on empiric antibiotic therapy with Vanco mycin and cefepime.  Cultures were drawn however remained negative.  He had no evidence of UTI and his chest x-ray was clear.  Blood cultures are pending at the time of this dictation however preliminary read was negative.  He was very anxious to discharge as he began feeling better very quickly and much sooner than expected based on his initial presentation.  His fever resolved and was well controlled with Tylenol.  He was requesting to discharge as he does have a follow-up appointment with oncology tomorrow to get him ready for his next chemotherapy appointment which is coming up on 8/3.  He was discharged to complete 7 days of oral antibiotic therapy and will follow up with oncology to ensure he is stable for chemotherapy.  His WBC began to rise after the dose of filgrastim that he received.  WBC on discharge was 2.5       Therefore, he is discharged in good and stable condition to home with close outpatient follow-up.    The patient met 2-midnight criteria for an inpatient stay at the time of discharge.    Discharge Date  8/1/2019    FOLLOW UP ITEMS POST DISCHARGE  Follow-up with oncology tomorrow    DISCHARGE DIAGNOSES  Principal Problem:    Neutropenic fever (HCC) POA: Unknown  Active Problems:    Epigastric pain POA: Yes    Gastric cancer (HCC) POA: Yes    HTN (hypertension) POA: Yes    Chemotherapy-induced neutropenia (HCC) POA: Yes    Hyponatremia POA: Unknown  Resolved Problems:    * No resolved hospital problems. *      FOLLOW  UP  Future Appointments   Date Time Provider Department Center   8/2/2019  4:30 PM [facility] [address]   8/3/2019  8:00 AM [facility] [address]   8/4/2019  3:15 PM [facility] [address]   8/17/2019  8:00 AM [facility] [address]   8/18/2019  3:15 PM [facility] [address]     Jenae Steward M.D.  [address]     [place] [address]  [phone]    Schedule an appointment as soon as possible for a visit in 1 week  PLEASE CALL PCP OFFICE DIRECTLY TO ARRANGE YOUR FOLLOW UP APPOINTMENT. THANK YOU      MEDICATIONS ON DISCHARGE     Medication List      START taking these medications      Instructions   acetaminophen 325 MG Tabs  Commonly known as:  TYLENOL   Take 2 Tabs by mouth every 6 hours as needed (Mild Pain; (Pain scale 1-3); Temp greater than 100.5 F).  Dose:  650 mg     amoxicillin-clavulanate 875-125 MG Tabs  Commonly known as:  AUGMENTIN   Take 1 Tab by mouth 2 times a day for 7 days.  Dose:  1 Tab        CONTINUE taking these medications      Instructions   COLACE 100 MG Caps  Generic drug:  docusate sodium   Take 100 mg by mouth 2 times a day.  Dose:  100 mg     lisinopril 10 MG Tabs  Commonly known as:  PRINIVIL   Take 10 mg by mouth every day.  Dose:  10 mg     morphine 15 MG tablet  Commonly known as:  MS IR   Take 15 mg by mouth every four hours as needed for Severe Pain.  Dose:  15 mg     omeprazole 40 MG delayed-release capsule  Commonly known as:  PRILOSEC   Take 1 Cap by mouth every day.  Dose:  40 mg     ondansetron 8 MG Tbdp  Commonly known as:  ZOFRAN ODT   Take 8 mg by mouth every 8 hours as needed for Nausea.  Dose:  8 mg     oxyCODONE immediate release 10 MG immediate release tablet  Commonly known as:  ROXICODONE   Take 10 mg by mouth every 2 hours as needed for Moderate Pain.  Dose:  10 mg     promethazine 25 MG Tabs  Commonly known as:  PHENERGAN   Take 0.5 Tabs by mouth every 6 hours as needed.  Dose:  12.5 mg         STOP taking these medications    NARCAN 4 MG/0.1ML Liqd  Generic drug:  Naloxone            Allergies  No Known Allergies    DIET  Orders Placed This Encounter   Procedures   • Diet Order Regular     Standing Status:   Standing     Number of Occurrences:   1     Order Specific Question:   Diet:     Answer:   Regular [1]       ACTIVITY  As tolerated.  Weight bearing as tolerated    CONSULTATIONS  The case was discussed with Dr. Corral over the phone on admission    PROCEDURES  None    LABORATORY  Lab Results   Component Value Date    SODIUM 134 (L) 08/01/2019    POTASSIUM 3.9 08/01/2019    CHLORIDE 106 08/01/2019    CO2 23 08/01/2019    GLUCOSE 87 08/01/2019    BUN 10 08/01/2019    CREATININE 1.06 08/01/2019        Lab Results   Component Value Date    WBC 2.5 (LL) 08/01/2019    HEMOGLOBIN 8.5 (L) 08/01/2019    HEMATOCRIT 25.8 (L) 08/01/2019    PLATELETCT 167 08/01/2019        Total time of the discharge process exceeds 39 minutes.

## 2019-08-01 NOTE — ED PROVIDER NOTES
CHIEF COMPLAINT  Chief Complaint   Patient presents with   • Fever   • Fatigue       HPI  Tima Rizzo is a 48 y.o. male who presents to the emergency department with fever today.  In addition the patient has a history of gastric cancer and is undergoing chemotherapy and his last dose of chemotherapy was on Saturday.  This afternoon he developed fever and he feels very fatigued and has a very mild headache but otherwise is asymptomatic.  He does not recognize any other exacerbating or alleviating factors or precipitating events and says that he does not feel sick    REVIEW OF SYSTEMS no cough no shortness of breath no hemoptysis no sore throat no abdominal pain no nausea vomiting or diarrhea, no pain or discomfort with urination.  All other systems negative    PAST MEDICAL HISTORY  Past Medical History:   Diagnosis Date   • Anesthesia     slow to wake up after EGD   • Anxiety    • Bell palsy 2000; 2010    x2; mild left facial droop   • Cancer (HCC) 2019    Gastric   • Enlarged prostate    • History of positive PPD, untreated    • Hypertension    • Numbness of arm     and hands, bilateral   • Pain     neck and arms   • Prediabetes    • Psychiatric problem     depression    • Snoring    • Urinary bladder disorder     frequent urination (enlarged prostate)       FAMILY HISTORY  History reviewed. No pertinent family history.    SOCIAL HISTORY  Social History     Socioeconomic History   • Marital status: Single     Spouse name: Not on file   • Number of children: Not on file   • Years of education: Not on file   • Highest education level: Not on file   Occupational History   • Not on file   Social Needs   • Financial resource strain: Not on file   • Food insecurity:     Worry: Not on file     Inability: Not on file   • Transportation needs:     Medical: Not on file     Non-medical: Not on file   Tobacco Use   • Smoking status: Current Every Day Smoker     Packs/day: 0.50     Years: 30.00     Pack years: 15.00      Types: Cigarettes   • Smokeless tobacco: Never Used   Substance and Sexual Activity   • Alcohol use: Yes     Comment: 2 per year   • Drug use: Yes     Types: Marijuana     Comment: Cannabis, daily   • Sexual activity: Not on file   Lifestyle   • Physical activity:     Days per week: Not on file     Minutes per session: Not on file   • Stress: Not on file   Relationships   • Social connections:     Talks on phone: Not on file     Gets together: Not on file     Attends Muslim service: Not on file     Active member of club or organization: Not on file     Attends meetings of clubs or organizations: Not on file     Relationship status: Not on file   • Intimate partner violence:     Fear of current or ex partner: Not on file     Emotionally abused: Not on file     Physically abused: Not on file     Forced sexual activity: Not on file   Other Topics Concern   • Not on file   Social History Narrative   • Not on file       SURGICAL HISTORY  Past Surgical History:   Procedure Laterality Date   • CATH PLACEMENT Right 6/13/2019    Procedure: INSERTION, CATHETER- CEPHALIC POWER PORT  ;  Surgeon: Dickson Simpson M.D.;  Location: [facility];  Service: General   • PB UPPER GI ENDOSCOPY,BIOPSY  6/12/2019    Procedure: GASTROSCOPY, WITH BIOPSY - POSSIBLE;  Surgeon: Bjorn Claudio M.D.;  Location: [facility];  Service: Gastroenterology   • PB UPPER GI ENDOSCOPY,W/DILAT,GASTRIC OUT  6/12/2019    Procedure: GASTROSCOPY, WITH BALLOON DILATION;  Surgeon: Bjorn Claudio M.D.;  Location: [facility];  Service: Gastroenterology   • GASTROSCOPY  6/12/2019    Procedure: GASTROSCOPY;  Surgeon: Bjorn Claudio M.D.;  Location: [facility];  Service: Gastroenterology   • EGD W/ENDOSCOPIC ULTRASOUND  6/12/2019    Procedure: EGD, WITH ENDOSCOPIC US - UPPER, RADIAL;  Surgeon: Bjorn Claudio M.D.;  Location: [facility];  Service: Gastroenterology   • EGD WITH ASP/BX   "6/12/2019    Procedure: EGD, WITH ASPIRATION BIOPSY - POSSIBLE;  Surgeon: Bjorn Claudio M.D.;  Location: SUR[facility]ERY [facility];  Service: Gastroenterology   • OTHER SURGICAL PROCEDURE  2014    cervical discectomy with plates       CURRENT MEDICATIONS  Home Medications    **Home medications have not yet been reviewed for this encounter**         ALLERGIES  No Known Allergies    PHYSICAL EXAM  VITAL SIGNS: /76   Pulse 85   Temp 37.6 °C (99.7 °F) (Temporal)   Resp 18   Ht 1.702 m (5' 7\")   Wt 64.2 kg (141 lb 8.6 oz)   SpO2 97%   BMI 22.17 kg/m²    Oxygen saturation is interpreted as adequate  Constitutional: Awake verbal nontoxic-appearing  HENT: Mucous membranes are moist throat is clear I do not see any discharge or asymmetry  Eyes: No erythema discharge or jaundice  Neck: Trachea midline no JVD no meningeal findings  Cardiovascular: Regular rate and rhythm  Lungs: Clear and equal bilaterally with no apparent difficulty breathing  Abdomen/Back: Soft and nontender nondistended with normally active bowel sounds there is no rebound guarding or peritoneal findings  Skin: Warm and dry with good color turgor and capillary refill there are no rashes or areas of skin breakdown or apparent infection seen  Musculoskeletal: No acute bony deformity  Neurologic: Awake verbal moving all extremities without difficulty    Laboratory  CBC shows a low white blood cell count of 1.5 with an absolute neutrophil count of 0.08.  The hemoglobin is also low at 9.1 and the platelet count is 192.  Blood cultures and urine cultures were sent to the lab.  LFTs are unremarkable lactic acid level is normal at 1.4 basic metabolic panel is unremarkable    Radiology  DX-CHEST-PORTABLE (1 VIEW)   Final Result      1.  There is no acute cardiopulmonary process.        MEDICAL DECISION MAKING and DISPOSITION  In the emergency department an IV was established.  Reverse isolation precautions were taken.  The patient was started on " empiric broad-spectrum antibiotics per pharmacy protocol.  I reviewed the case with Dr. Corral from oncology who recommended the patient be hospitalized for further evaluation and treatment.  I reviewed the case with the hospitalist and the patient is admitted to the hospitalist service.  I reviewed all the findings thus far available and the plan of care with the patient and his family    IMPRESSION  1.  Fever with neutropenia  2.  History of gastric cancer currently being treated with chemotherapy         Electronically signed by: Chacho Carter, 7/31/2019 8:20 PM

## 2019-08-01 NOTE — DISCHARGE INSTRUCTIONS
Nursing Comm:  D/C to: Home  Condition: stable  Diet: As tolerated  Activity: As tolerated  Instructions: Please instruct patient to return to the ED if he has any return or worsening of his fever, develops productive cough, burning with urination, diarrhea or vomiting productive cough, pain with urination, diarrhea or vomiting.    Use caution when interacting with others due to a suppressed immune system.  Always wash hands.  Consider wearing a mask when out in public    Follow up: With oncology tomorrow to have labs repeated and to ensure stability prior to chemotherapy on Saturday    Discharge Instructions    Discharged to home by car with self. Discharged via walking, hospital escort: Refused.  Special equipment needed: Not Applicable    Be sure to schedule a follow-up appointment with your primary care doctor or any specialists as instructed.     Discharge Plan:   Influenza Vaccine Indication: Not indicated: Previously immunized this influenza season and > 8 years of age    I understand that a diet low in cholesterol, fat, and sodium is recommended for good health. Unless I have been given specific instructions below for another diet, I accept this instruction as my diet prescription.   Other diet: regular    Special Instructions: None    · Is patient discharged on Warfarin / Coumadin?   No     Depression / Suicide Risk    As you are discharged from this [facility] facility, it is important to learn how to keep safe from harming yourself.    Recognize the warning signs:  · Abrupt changes in personality, positive or negative- including increase in energy   · Giving away possessions  · Change in eating patterns- significant weight changes-  positive or negative  · Change in sleeping patterns- unable to sleep or sleeping all the time   · Unwillingness or inability to communicate  · Depression  · Unusual sadness, discouragement and loneliness  · Talk of wanting to die  · Neglect of personal  appearance   · Rebelliousness- reckless behavior  · Withdrawal from people/activities they love  · Confusion- inability to concentrate     If you or a loved one observes any of these behaviors or has concerns about self-harm, here's what you can do:  · Talk about it- your feelings and reasons for harming yourself  · Remove any means that you might use to hurt yourself (examples: pills, rope, extension cords, firearm)  · Get professional help from the community (Mental Health, Substance Abuse, psychological counseling)  · Do not be alone:Call your Safe Contact- someone whom you trust who will be there for you.  · Call your local CRISIS HOTLINE 462-9610 or 109-198-2781  · Call your local Children's Mobile Crisis Response Team [place] (783) 597-5078 or www.[employer]  · Call the toll free National Suicide Prevention Hotlines   · National Suicide Prevention Lifeline 935-529-XBTJ (6445)  · [employer] 800-SUICIDE (032-5511)    Neutropenic Fever  Neutropenic fever is a type of fever that can develop in someone who has a very low number of a certain kind of white blood cells called neutrophils (neutropenia). These blood cells are important for fighting infections caused by bacteria and fungi. When you have neutropenia, you could be in danger of a severe infection. You may need to start taking antibiotic medicines.  CAUSES  Neutrophils are made in the spongy tissue inside your bones (bone marrow). Anything that damages your bone marrow or damages neutrophils after they leave your bone marrow can cause neutropenia. Once you have a dangerously low level of neutrophils, you are at risk for infection and neutropenic fever.  Causes of neutropenia may include:  · Cancer treatments.  · Bone marrow cancer.  · Cancer of the white blood cells (leukemia or myeloma).  · Severe infection.  · Bone marrow failure (aplastic anemia).  · Many types of medicines.  · Diseases of the body's defense system (autoimmune  diseases).  · Inherited genes that cause neutropenia.  · Vitamin B deficiency.  · Spleen enlargement in rheumatoid arthritis (Felty syndrome).  SIGNS AND SYMPTOMS  Fever is the main symptom of neutropenic fever. Other signs and symptoms may include:  · Chills.  · Fatigue.  · Painful mouth ulcers.  · Cough.  · Shortness of breath.  · Swollen glands (lymph nodes).  · Sore throat.  · Sinus and ear infections.  · Gum disease.  · Skin infection.  · Burning and frequent urination.  · Rectal infections.  · Vaginal discharge or itching.  DIAGNOSIS   Your health care provider may diagnose neutropenic fever if your neutrophil count is less than 500 neutrophils per microliter of blood and you have a fever of at least 100.4°F (38.0°C).   · Blood tests and other tests that measure neutrophils will be done. These may include:  ¨ A complete blood count (CBC) and a differential white blood count (WBC).  ¨ Peripheral smear. This test involves checking a blood sample under a microscope.  · Other types of tests may also be done, including:  ¨ Chest X-rays.  ¨ Cultures of blood and body fluids to look for a source of infection.  Your health care provider will also determine if your neutropenic fever is high risk or low risk.  · You may have high-risk neutropenic fever if:  ¨ Your neutrophil count is less than 100 neutrophils per microliter of blood.  ¨ You have also been diagnosed with pneumonia or another serious medical problem.  ¨ Your condition requires you to be treated in the hospital.  · You may have low-risk neutropenic fever if:  ¨ Your neutrophil count is more than 100 neutrophils per microliter of blood.  ¨ Your chest X-ray is normal.  ¨ You do not have an active illness or any other problems that require you to be in the hospital.  TREATMENT   You may start treatment as soon as you get diagnosed with neutropenic fever, even if your health care provider is still looking for the source of infection.  · Treatment for high-risk  neutropenic fever is antibiotic medicine given through an IV access tube. This is done in the hospital. You may be given a single antibiotic or a combination of antibiotics.  · Low-risk neutropenic fever may be treated at home. You may have to take one or two different oral antibiotics. In some cases, you may need to be treated with IV antibiotics that are given by a home health care provider who visits your home.  · If your health care provider finds a specific cause of infection, you may be switched to the antibiotics that work best against those particular bacteria.  · If a fungal infection is found, your medicine will be changed to an antifungal medicine.  · If the fever goes away in 3-5 days, you may have to take medicine for about 7 days. If the fever is not responding, you may have to take medicine longer.  · You may have to stop taking any medicine that could be causing neutropenic fever.  · If you have neutropenic fever from cancer treatment drugs (chemotherapy), you may need to take a type of medicine called white blood cell growth factors. This medicine can help prevent fever.  HOME CARE INSTRUCTIONS  · Only take medicines as directed by your health care provider.  ¨ If you are being treated with oral antibiotics at home, you may need to return to your health care provider every day to have your CBC checked. You may have to do this until your fever responds.  ¨ Take your antibiotics as directed. Make sure you finish them even if you start to feel better.  · Preventing infection is important when you have neutropenia. Here are some ways to prevent infections:  ¨ Avoid sick friends and family members.  ¨ Wash your hands often.  ¨ Do not eat uncooked or undercooked meats.  ¨ Wash all fruits and vegetables.  ¨ Do not eat or drink unpasteurized dairy products.  ¨ Get regular dental care, and maintain good dental hygiene.  ¨ Get a flu shot. Ask your health care provider whether you need any other  vaccines.  ¨ Wear gloves when gardening.  · Follow up with your health care provider as directed.  SEEK MEDICAL CARE IF:  · You have chills.  · You have a fever.  · You have signs or symptoms of infection.  SEEK IMMEDIATE MEDICAL CARE IF:  · You have trouble breathing.  · You have chest pain.  This information is not intended to replace advice given to you by your health care provider. Make sure you discuss any questions you have with your health care provider.  Document Released: 12/23/2014 Document Reviewed: 12/23/2014  [employer] Interactive Patient Education © 2017 [employer]

## 2019-08-01 NOTE — ED NOTES
Labs bld cx x 2 drawn in triage. Pt placed in room 4.  Power port accessed , good blood return .  Pt on reverse iso

## 2019-08-01 NOTE — ASSESSMENT & PLAN NOTE
Unknown source, UA and CXR unremarkable, patient denies having any diarrhea.  Pan cultures ordered and pending  Started on IV vancomycin and IV cefepime, will de-escalate as warranted  Continue fever control with PRN Tylenol

## 2019-08-01 NOTE — PROGRESS NOTES
REPORT ACCEPTED FROM ED RN KIARA: PATIENT ASSIGNED TO ROOM 308(NEUTROPENIC FEVER/NEUTROPENIC PRECAUTIONS)

## 2019-08-01 NOTE — ASSESSMENT & PLAN NOTE
Possibly infection related? Last chemotherapy was 7/21, should be out of yenifer window.  Continue to monitor  Will start neupogen.

## 2019-08-01 NOTE — PROGRESS NOTES
REF: CHG BATH(PATIENT HAS PORT)  ASSIGNED CNA MADE AWARE OF DL PICC AND INSTRUCTED TO COMPLETE CHG BATH

## 2019-08-01 NOTE — PROGRESS NOTES
Patient resting in bed. Will be discharged today. Patient aware. Bed in low locked position, call bell within reach. Continue to monitor.

## 2019-08-01 NOTE — PROGRESS NOTES
RE: AM LABS  Results for JOSELINE SANTIAGO (MRN [id number]) as of 8/1/2019 07:17   Ref. Range 7/31/2019 16:53 7/31/2019 16:55 7/31/2019 17:18 7/31/2019 21:04 8/1/2019 04:14   WBC Latest Ref Range: 4.8 - 10.8 K/uL 1.5 (LL)    2.5 (LL)   LABS TRENDING UP; TREATMENT APPROPRIATE; CONTINUE TO MONITOR

## 2019-08-01 NOTE — PROGRESS NOTES
Telemetry Strip     Strip printed: 0652  Measurements from am strip were as follows:  Rhythm:sr  HR: 75  Measurements: .16/.10/.42       Telemetry Shift Summary:    Rhythm: sr  HR: 60-70s  Ectopy:           Normal Values  Rhythm SR  HR Range    Measurements 0.12-0.20 / 0.06-0.10  / 0.30-0.52

## 2019-08-01 NOTE — PROGRESS NOTES
"Pharmacy Kinetics 48 y.o. male on vancomycin day # 1 2019    Currently on Vancomycin 1500 mg iv x 1 (loading dose)  1928 and 1000 mg IV x 1  011    Indication for Treatment: Neutropenic fever    Pertinent history per medical record: Admitted on 2019 for fever, chills and fatigue.  Patient on the chemotherapy regimen FLOT for malignant neoplasm of stomach, last treatment 19.  CXR and UA are negative; starting empiric broad spectrum antibiotics.       Other antibiotics: cefepime 2 g IV q8h    Allergies: Patient has no known allergies.     List concerns for renal function: none at this time    Pertinent cultures to date:    BCx in process      Recent Labs     19  1653 19  0414   WBC 1.5* 2.5*   NEUTSPOLYS 4.00* 1.00*   BANDSSTABS 1.00  --      Recent Labs     19  1653 19  0414   BUN 15 10   CREATININE 1.25 1.06   ALBUMIN 3.3 2.6*     No results for input(s): VANCOTROUGH, VANCOPEAK, VANCORANDOM in the last 72 hours.    Intake/Output Summary (Last 24 hours) at 2019 0725  Last data filed at 2019 1901  Gross per 24 hour   Intake 100 ml   Output --   Net 100 ml      /79   Pulse 72   Temp 37 °C (98.6 °F) (Temporal)   Resp 18   Ht 1.702 m (5' 7\")   Wt 64.7 kg (142 lb 10.2 oz)   SpO2 98%  Temp (24hrs), Av.4 °C (99.4 °F), Min:36.9 °C (98.5 °F), Max:38.5 °C (101.3 °F)      A/P   1. Vancomycin dose change: 750 mg IV q8h (0400,1200,2000)  2. Next vancomycin level:  at 0330  3. Goal trough: 16-20 mcg/ml  4. Comments: will monitor labs and clinical course, adjusting regimen as indicated    Julio Ny, PharmD    "

## 2019-08-01 NOTE — ASSESSMENT & PLAN NOTE
T4b N3a Mx  Undergoing chemotherapy under  direction  Thus far has received Cycle 2/4 preoperatively next one due 8/3

## 2019-08-01 NOTE — PROGRESS NOTES
Telemetry Shift Summary    Rhythm SR/ST  HR Range 70-100s  Ectopy rare PVC  Measurements 0.14/0.08/0.36        Normal Values  Rhythm SR  HR Range    Measurements 0.12-0.20 / 0.06-0.10  / 0.30-0.52

## 2019-08-01 NOTE — PROGRESS NOTES
R: TWO RN SKIN CHECK  SKIN CHECK COMPLETED: MINOR SCABBED OVER SKIN ABRASIONS TO FACE AND BILATERAL ARMS. NO OTHER SKIN ISSUES/CONCERNS IDENTIFIED @ THIS TIME

## 2019-08-01 NOTE — PROGRESS NOTES
Patient given discharge information. Port access and tele discontinued. Patient taken to front lobby via ambulation.

## 2019-08-02 ENCOUNTER — OUTPATIENT INFUSION SERVICES (OUTPATIENT)
Dept: ONCOLOGY | Facility: MEDICAL CENTER | Age: 49
End: 2019-08-02
Attending: INTERNAL MEDICINE
Payer: MEDICARE

## 2019-08-02 VITALS
SYSTOLIC BLOOD PRESSURE: 135 MMHG | DIASTOLIC BLOOD PRESSURE: 80 MMHG | BODY MASS INDEX: 23.31 KG/M2 | WEIGHT: 145.06 LBS | HEART RATE: 98 BPM | OXYGEN SATURATION: 99 % | RESPIRATION RATE: 18 BRPM | HEIGHT: 66 IN | TEMPERATURE: 98.4 F

## 2019-08-02 DIAGNOSIS — D70.1 CHEMOTHERAPY-INDUCED NEUTROPENIA (HCC): ICD-10-CM

## 2019-08-02 DIAGNOSIS — T45.1X5A CHEMOTHERAPY-INDUCED NEUTROPENIA (HCC): ICD-10-CM

## 2019-08-02 DIAGNOSIS — C16.2 MALIGNANT NEOPLASM OF BODY OF STOMACH (HCC): ICD-10-CM

## 2019-08-02 LAB
ANISOCYTOSIS BLD QL SMEAR: ABNORMAL
BASOPHILS # BLD AUTO: 0 % (ref 0–1.8)
BASOPHILS # BLD: 0 K/UL (ref 0–0.12)
EOSINOPHIL # BLD AUTO: 0.12 K/UL (ref 0–0.51)
EOSINOPHIL NFR BLD: 2.9 % (ref 0–6.9)
ERYTHROCYTE [DISTWIDTH] IN BLOOD BY AUTOMATED COUNT: 42.3 FL (ref 35.9–50)
HCT VFR BLD AUTO: 26.6 % (ref 42–52)
HGB BLD-MCNC: 8.9 G/DL (ref 14–18)
LYMPHOCYTES # BLD AUTO: 2.9 K/UL (ref 1–4.8)
LYMPHOCYTES NFR BLD: 72.6 % (ref 22–41)
MACROCYTES BLD QL SMEAR: ABNORMAL
MANUAL DIFF BLD: NORMAL
MCH RBC QN AUTO: 30.1 PG (ref 27–33)
MCHC RBC AUTO-ENTMCNC: 33.5 G/DL (ref 33.7–35.3)
MCV RBC AUTO: 89.9 FL (ref 81.4–97.8)
MICROCYTES BLD QL SMEAR: ABNORMAL
MONOCYTES # BLD AUTO: 0.7 K/UL (ref 0–0.85)
MONOCYTES NFR BLD AUTO: 17.6 % (ref 0–13.4)
MORPHOLOGY BLD-IMP: NORMAL
NEUTROPHILS # BLD AUTO: 0.28 K/UL (ref 1.82–7.42)
NEUTROPHILS NFR BLD: 6.9 % (ref 44–72)
NRBC # BLD AUTO: 0 K/UL
NRBC BLD-RTO: 0 /100 WBC
PLATELET # BLD AUTO: 221 K/UL (ref 164–446)
PLATELET BLD QL SMEAR: NORMAL
PMV BLD AUTO: 9.7 FL (ref 9–12.9)
RBC # BLD AUTO: 2.96 M/UL (ref 4.7–6.1)
RBC BLD AUTO: PRESENT
SMUDGE CELLS BLD QL SMEAR: NORMAL
WBC # BLD AUTO: 4 K/UL (ref 4.8–10.8)

## 2019-08-02 PROCEDURE — 36591 DRAW BLOOD OFF VENOUS DEVICE: CPT

## 2019-08-02 PROCEDURE — A4212 NON CORING NEEDLE OR STYLET: HCPCS

## 2019-08-02 PROCEDURE — 85027 COMPLETE CBC AUTOMATED: CPT

## 2019-08-02 PROCEDURE — 85007 BL SMEAR W/DIFF WBC COUNT: CPT

## 2019-08-02 PROCEDURE — 96372 THER/PROPH/DIAG INJ SC/IM: CPT

## 2019-08-02 PROCEDURE — 700111 HCHG RX REV CODE 636 W/ 250 OVERRIDE (IP): Mod: JG | Performed by: NURSE PRACTITIONER

## 2019-08-02 PROCEDURE — 700111 HCHG RX REV CODE 636 W/ 250 OVERRIDE (IP)

## 2019-08-02 RX ADMIN — FILGRASTIM 300 MCG: 300 INJECTION, SOLUTION INTRAVENOUS; SUBCUTANEOUS at 17:45

## 2019-08-02 RX ADMIN — HEPARIN 500 UNITS: 100 SYRINGE at 17:35

## 2019-08-03 ENCOUNTER — OUTPATIENT INFUSION SERVICES (OUTPATIENT)
Dept: ONCOLOGY | Facility: MEDICAL CENTER | Age: 49
End: 2019-08-03
Attending: INTERNAL MEDICINE
Payer: MEDICARE

## 2019-08-03 VITALS
RESPIRATION RATE: 18 BRPM | SYSTOLIC BLOOD PRESSURE: 119 MMHG | HEART RATE: 82 BPM | HEIGHT: 66 IN | TEMPERATURE: 98 F | DIASTOLIC BLOOD PRESSURE: 78 MMHG | BODY MASS INDEX: 23.53 KG/M2 | WEIGHT: 146.39 LBS | OXYGEN SATURATION: 100 %

## 2019-08-03 DIAGNOSIS — R50.81 NEUTROPENIC FEVER (HCC): ICD-10-CM

## 2019-08-03 DIAGNOSIS — D70.9 NEUTROPENIC FEVER (HCC): ICD-10-CM

## 2019-08-03 LAB
BACTERIA UR CULT: NORMAL
BASOPHILS # BLD AUTO: 0 % (ref 0–1.8)
BASOPHILS # BLD: 0 K/UL (ref 0–0.12)
EOSINOPHIL # BLD AUTO: 0.07 K/UL (ref 0–0.51)
EOSINOPHIL NFR BLD: 0.9 % (ref 0–6.9)
ERYTHROCYTE [DISTWIDTH] IN BLOOD BY AUTOMATED COUNT: 43.3 FL (ref 35.9–50)
HCT VFR BLD AUTO: 30.9 % (ref 42–52)
HGB BLD-MCNC: 9.9 G/DL (ref 14–18)
LYMPHOCYTES # BLD AUTO: 3.57 K/UL (ref 1–4.8)
LYMPHOCYTES NFR BLD: 48.2 % (ref 22–41)
MANUAL DIFF BLD: NORMAL
MCH RBC QN AUTO: 29.1 PG (ref 27–33)
MCHC RBC AUTO-ENTMCNC: 32 G/DL (ref 33.7–35.3)
MCV RBC AUTO: 90.9 FL (ref 81.4–97.8)
METAMYELOCYTES NFR BLD MANUAL: 4.6 %
MONOCYTES # BLD AUTO: 1.1 K/UL (ref 0–0.85)
MONOCYTES NFR BLD AUTO: 14.8 % (ref 0–13.4)
MORPHOLOGY BLD-IMP: NORMAL
MYELOCYTES NFR BLD MANUAL: 1.9 %
NEUTROPHILS # BLD AUTO: 2.19 K/UL (ref 1.82–7.42)
NEUTROPHILS NFR BLD: 25.9 % (ref 44–72)
NEUTS BAND NFR BLD MANUAL: 3.7 % (ref 0–10)
NRBC # BLD AUTO: 0.08 K/UL
NRBC BLD-RTO: 1.1 /100 WBC
PLATELET # BLD AUTO: 222 K/UL (ref 164–446)
PLATELET BLD QL SMEAR: NORMAL
PMV BLD AUTO: 9.5 FL (ref 9–12.9)
POLYCHROMASIA BLD QL SMEAR: NORMAL
RBC # BLD AUTO: 3.4 M/UL (ref 4.7–6.1)
RBC BLD AUTO: PRESENT
SIGNIFICANT IND 70042: NORMAL
SITE SITE: NORMAL
SOURCE SOURCE: NORMAL
WBC # BLD AUTO: 7.4 K/UL (ref 4.8–10.8)

## 2019-08-03 PROCEDURE — 85007 BL SMEAR W/DIFF WBC COUNT: CPT

## 2019-08-03 PROCEDURE — 36415 COLL VENOUS BLD VENIPUNCTURE: CPT

## 2019-08-03 PROCEDURE — 96372 THER/PROPH/DIAG INJ SC/IM: CPT

## 2019-08-03 PROCEDURE — 85027 COMPLETE CBC AUTOMATED: CPT

## 2019-08-03 NOTE — PROGRESS NOTES
Patient presents ambulatory for CBC/possible neupogen.  Patient reports feeling well and denies any s/s of infection at this time.  Labs collected per MD order via 25 g butterfly, gauze, and coban applied to site.  Labs reviewed and are NOT within parameters to proceed with treatment.  Patient updated, educated on neutropenic precautions, verbalized understanding.  Patient to return 8/10/19, confirmed with patient.  Patient left ambulatory in no distress.

## 2019-08-03 NOTE — PROGRESS NOTES
Patient presents ambulatory for CBC/possible neupogen injection.  Patient discharged from hospital on 8/1/19, phone call to MD regarding possible chemo appt on 8/3/19.  Per MD patient to receive CBC/poss neupogen for three days until ANC reaches 1500 and to defer chemotherapy by one week.  Patient updated on POC.  Port accessed using sterile technique, brisk blood return noted, and lab collected per MD order.  Lab reviewed and is within parameters to proceed with treatment.  Neupogen injection per MD order with no complications or adverse reactions.  Patient to return 8/3/19, confirmed with patient.  Port flushed per protocol, brisk blood return noted, de accessed, needle intact, gauze, and tape applied to site.  Patient left ambulatory in no distress.

## 2019-08-04 ENCOUNTER — OUTPATIENT INFUSION SERVICES (OUTPATIENT)
Dept: ONCOLOGY | Facility: MEDICAL CENTER | Age: 49
End: 2019-08-04
Attending: INTERNAL MEDICINE
Payer: MEDICARE

## 2019-08-04 VITALS
TEMPERATURE: 97.6 F | BODY MASS INDEX: 22.99 KG/M2 | OXYGEN SATURATION: 98 % | DIASTOLIC BLOOD PRESSURE: 76 MMHG | SYSTOLIC BLOOD PRESSURE: 124 MMHG | HEIGHT: 66 IN | HEART RATE: 94 BPM | RESPIRATION RATE: 18 BRPM | WEIGHT: 143.08 LBS

## 2019-08-04 PROCEDURE — 306780 HCHG STAT FOR TRANSFUSION PER CASE

## 2019-08-04 NOTE — PROGRESS NOTES
"Pt arrived ambulatory to the [facility] for Neupogen. Upon arrival,  review of the treatment plan with pt. Pt reports \"wasn't sure if he needed to be here today to check blood and give a shot\". Labs and treatment plan reviewed with patient, charge RN, and pharmacy. ANC on 8/3/19 was 2190, ANC is greater than 1500, doesn't meet criteria of treatment plan, pt verbalized understanding. Pt provided handout for upcoming apt's. Pt left the [facility] ambulatory in no sign of distress.  "

## 2019-08-05 ENCOUNTER — APPOINTMENT (OUTPATIENT)
Dept: ONCOLOGY | Facility: MEDICAL CENTER | Age: 49
End: 2019-08-05
Attending: INTERNAL MEDICINE
Payer: MEDICARE

## 2019-08-05 LAB
BACTERIA BLD CULT: NORMAL
BACTERIA BLD CULT: NORMAL
SIGNIFICANT IND 70042: NORMAL
SIGNIFICANT IND 70042: NORMAL
SITE SITE: NORMAL
SITE SITE: NORMAL
SOURCE SOURCE: NORMAL
SOURCE SOURCE: NORMAL

## 2019-08-10 ENCOUNTER — OUTPATIENT INFUSION SERVICES (OUTPATIENT)
Dept: ONCOLOGY | Facility: MEDICAL CENTER | Age: 49
End: 2019-08-10
Attending: INTERNAL MEDICINE
Payer: MEDICARE

## 2019-08-10 VITALS
TEMPERATURE: 98.4 F | BODY MASS INDEX: 22.6 KG/M2 | OXYGEN SATURATION: 98 % | SYSTOLIC BLOOD PRESSURE: 134 MMHG | HEART RATE: 92 BPM | HEIGHT: 66 IN | RESPIRATION RATE: 18 BRPM | WEIGHT: 140.65 LBS | DIASTOLIC BLOOD PRESSURE: 88 MMHG

## 2019-08-10 DIAGNOSIS — C16.2 MALIGNANT NEOPLASM OF BODY OF STOMACH (HCC): ICD-10-CM

## 2019-08-10 LAB
ALBUMIN SERPL BCP-MCNC: 3.8 G/DL (ref 3.2–4.9)
ALBUMIN/GLOB SERPL: 1.2 G/DL
ALP SERPL-CCNC: 48 U/L (ref 30–99)
ALT SERPL-CCNC: 12 U/L (ref 2–50)
ANION GAP SERPL CALC-SCNC: 7 MMOL/L (ref 0–11.9)
AST SERPL-CCNC: 12 U/L (ref 12–45)
BASOPHILS # BLD AUTO: 0.3 % (ref 0–1.8)
BASOPHILS # BLD: 0.03 K/UL (ref 0–0.12)
BILIRUB SERPL-MCNC: 0.3 MG/DL (ref 0.1–1.5)
BUN SERPL-MCNC: 12 MG/DL (ref 8–22)
CALCIUM SERPL-MCNC: 9.1 MG/DL (ref 8.5–10.5)
CHLORIDE SERPL-SCNC: 103 MMOL/L (ref 96–112)
CO2 SERPL-SCNC: 25 MMOL/L (ref 20–33)
CREAT SERPL-MCNC: 0.85 MG/DL (ref 0.5–1.4)
EOSINOPHIL # BLD AUTO: 0.08 K/UL (ref 0–0.51)
EOSINOPHIL NFR BLD: 0.7 % (ref 0–6.9)
ERYTHROCYTE [DISTWIDTH] IN BLOOD BY AUTOMATED COUNT: 44.4 FL (ref 35.9–50)
GLOBULIN SER CALC-MCNC: 3.2 G/DL (ref 1.9–3.5)
GLUCOSE SERPL-MCNC: 123 MG/DL (ref 65–99)
HCT VFR BLD AUTO: 32.3 % (ref 42–52)
HGB BLD-MCNC: 10.7 G/DL (ref 14–18)
IMM GRANULOCYTES # BLD AUTO: 0.08 K/UL (ref 0–0.11)
IMM GRANULOCYTES NFR BLD AUTO: 0.7 % (ref 0–0.9)
LYMPHOCYTES # BLD AUTO: 2.69 K/UL (ref 1–4.8)
LYMPHOCYTES NFR BLD: 25 % (ref 22–41)
MCH RBC QN AUTO: 29.6 PG (ref 27–33)
MCHC RBC AUTO-ENTMCNC: 33.1 G/DL (ref 33.7–35.3)
MCV RBC AUTO: 89.5 FL (ref 81.4–97.8)
MONOCYTES # BLD AUTO: 0.8 K/UL (ref 0–0.85)
MONOCYTES NFR BLD AUTO: 7.4 % (ref 0–13.4)
NEUTROPHILS # BLD AUTO: 7.1 K/UL (ref 1.82–7.42)
NEUTROPHILS NFR BLD: 65.9 % (ref 44–72)
NRBC # BLD AUTO: 0 K/UL
NRBC BLD-RTO: 0 /100 WBC
PLATELET # BLD AUTO: 347 K/UL (ref 164–446)
PMV BLD AUTO: 8.7 FL (ref 9–12.9)
POTASSIUM SERPL-SCNC: 3.8 MMOL/L (ref 3.6–5.5)
PROT SERPL-MCNC: 7 G/DL (ref 6–8.2)
RBC # BLD AUTO: 3.61 M/UL (ref 4.7–6.1)
SODIUM SERPL-SCNC: 135 MMOL/L (ref 135–145)
WBC # BLD AUTO: 10.8 K/UL (ref 4.8–10.8)

## 2019-08-10 PROCEDURE — 700111 HCHG RX REV CODE 636 W/ 250 OVERRIDE (IP)

## 2019-08-10 PROCEDURE — 304540 HCHG NITRO SET VENT 2ND TUB

## 2019-08-10 PROCEDURE — 96413 CHEMO IV INFUSION 1 HR: CPT

## 2019-08-10 PROCEDURE — 700111 HCHG RX REV CODE 636 W/ 250 OVERRIDE (IP): Performed by: INTERNAL MEDICINE

## 2019-08-10 PROCEDURE — 700105 HCHG RX REV CODE 258

## 2019-08-10 PROCEDURE — 96367 TX/PROPH/DG ADDL SEQ IV INF: CPT

## 2019-08-10 PROCEDURE — 700105 HCHG RX REV CODE 258: Performed by: INTERNAL MEDICINE

## 2019-08-10 PROCEDURE — 96415 CHEMO IV INFUSION ADDL HR: CPT

## 2019-08-10 PROCEDURE — 96368 THER/DIAG CONCURRENT INF: CPT

## 2019-08-10 PROCEDURE — 96417 CHEMO IV INFUS EACH ADDL SEQ: CPT

## 2019-08-10 PROCEDURE — A4212 NON CORING NEEDLE OR STYLET: HCPCS

## 2019-08-10 PROCEDURE — 85025 COMPLETE CBC W/AUTO DIFF WBC: CPT

## 2019-08-10 PROCEDURE — G0498 CHEMO EXTEND IV INFUS W/PUMP: HCPCS

## 2019-08-10 PROCEDURE — 80053 COMPREHEN METABOLIC PANEL: CPT

## 2019-08-10 PROCEDURE — 96375 TX/PRO/DX INJ NEW DRUG ADDON: CPT

## 2019-08-10 RX ORDER — DEXTROSE MONOHYDRATE 50 MG/ML
INJECTION, SOLUTION INTRAVENOUS CONTINUOUS
Status: DISCONTINUED | OUTPATIENT
Start: 2019-08-10 | End: 2019-08-10 | Stop reason: HOSPADM

## 2019-08-10 RX ADMIN — ONDANSETRON HYDROCHLORIDE 16 MG: 2 SOLUTION INTRAMUSCULAR; INTRAVENOUS at 10:29

## 2019-08-10 RX ADMIN — DEXTROSE MONOHYDRATE: 50 INJECTION, SOLUTION INTRAVENOUS at 10:12

## 2019-08-10 RX ADMIN — DOCETAXEL 86.6 MG: 20 INJECTION, SOLUTION, CONCENTRATE INTRAVENOUS at 11:41

## 2019-08-10 RX ADMIN — SODIUM CHLORIDE 150 MG: 9 INJECTION, SOLUTION INTRAVENOUS at 10:48

## 2019-08-10 RX ADMIN — LEUCOVORIN CALCIUM 346 MG: 350 INJECTION, POWDER, LYOPHILIZED, FOR SUSPENSION INTRAMUSCULAR; INTRAVENOUS at 12:57

## 2019-08-10 RX ADMIN — DEXAMETHASONE SODIUM PHOSPHATE 12 MG: 4 INJECTION, SOLUTION INTRAMUSCULAR; INTRAVENOUS at 10:12

## 2019-08-10 RX ADMIN — FLUOROURACIL 4500 MG: 50 INJECTION, SOLUTION INTRAVENOUS at 15:32

## 2019-08-10 RX ADMIN — OXALIPLATIN 147.05 MG: 100 INJECTION, SOLUTION, CONCENTRATE INTRAVENOUS at 12:57

## 2019-08-10 NOTE — PROGRESS NOTES
Chemotherapy Verification - SECONDARY RN       Height = 168 cm  Weight = 63.8 kg  BSA = 1.73 m2       Medication: Docetaxel  Dose: 50 mg/m2  Calculated Dose: 86.5 mg                             (In mg/m2, AUC, mg/kg)     Medication: Oxaliplatin  Dose: 85 mg/m2  Calculated Dose: 147.05 mg                             (In mg/m2, AUC, mg/kg)    Medication: Leucovorin  Dose: 200 mg/m2  Calculated Dose: 346 mg                             (In mg/m2, AUC, mg/kg)    Medication: Fluorouracil  Dose: 2,600 mg/m2   Calculated Dose: 4,498 mg                             (In mg/m2, AUC, mg/kg)    I confirm that this process was performed independently.

## 2019-08-10 NOTE — PROGRESS NOTES
Chemotherapy Verification - PRIMARY RN      Height = 66.14in  Weight = 63.8kg  BSA = 1.72m2       Medication: Taxotere  Dose: 50mg/m2  Calculated Dose: 86.09mg                             (In mg/m2, AUC, mg/kg)     Medication: Oxaliplatin  Dose: 85mg/m2  Calculated Dose: 146.2mg                             (In mg/m2, AUC, mg/kg)    Medication: Leucovorin  Dose: 200mg/m2  Calculated Dose: 344mg                             (In mg/m2, AUC, mg/kg)    Medication: Fluorouracil  Dose: 2600mg/m2  Calculated Dose: 4472mg over 24 hours                             (In mg/m2, AUC, mg/kg)    I confirm this process was performed independently with the BSA and all final chemotherapy dosing calculations congruent.  Any discrepancies of 10% or greater have been addressed with the chemotherapy pharmacist. The resolution of the discrepancy has been documented in the EPIC progress notes.

## 2019-08-10 NOTE — PROGRESS NOTES
"Pharmacy Chemotherapy Calculation:    Patient name: Tima Rizzo  DX: Gastric cancer    Cycle: 3 (delayed for neutropenia)  Previous treatment = C2 on 7/21/19    Protocol: FLOT  Docetaxel 50 mg/m2 on Day 1  Oxaliplatin 85 mg/m2 on Day 1  Leucovorin 200 mg/m2 on Day 1  Fluorouracil 2600 mg/m2 CIVI over 24 hours on Day 1  Every 14 days for 4 cycles preoperatively and 4 cycles postoperatively   NCCN Guidelines for Gastric Cancer v.1.2019  Al-Stuart DORSEY, et al. Lancet Oncol. 2016 Dec;17(12):6940-9993.     Allergies:  Patient has no known allergies.    /88   Pulse 92   Temp 36.9 °C (98.4 °F) (Temporal)   Resp 18   Ht 1.68 m (5' 6.14\") Comment: took 1 inch off of shoes  Wt 63.8 kg (140 lb 10.5 oz)   SpO2 98%   BMI 22.60 kg/m²   Body surface area is 1.73 meters squared.     Labs 8/10/19  ANC~ 7100 Plt = 347k   Hgb = 10.7     SCr = 0.85 mg/dL CrCl ~ 94.9 mL/min   LFT's = WNLs  TBili = 0.3     Docetaxel 50 mg/m2 x 1.73 m2 = 86.5 mg   <10% difference, okay to treat with final dose = 86.6 mg    Oxaliplatin 85 mg/m2 x  1.73 m2 =  147.05 mg   <10% difference, okay to treat with final dose = 147.05 mg    Leucovorin 200 mg/m2 x 1.73 m2 = 346 mg   <10% difference, okay to treat with final dose = 346 mg    Fluorouracil 2600 mg/m2  x 1.73 m2 = 4498 mg   <10% difference, okay to treat with final dose = 4500 mg in CADD pump for home infusion at 3.8 ml/hr over 24 hours    Sukumar Flores, PharmD          "

## 2019-08-10 NOTE — PROGRESS NOTES
Patient arrived ambulatory to the [facility] for C3D1of FLOT. Reviewed vital signs, labs, and physician order. Patient denies S&S of infection, or bleeding. Right chest port accessed with sterile technique, visualized brisk blood return, labs collected per MD order. Labs within limits to receive treatment. Pre treatment medications administered. Chemotherapy administered per MD order, no adverse reaction observed. Right chest port flushed per protocol, 5FU pump settings reviewed with Shyanne RN, and the pt. Confirmed upcoming appointment date and time with patient for Neulasta ONPRO, and 5FU pump disconnecy. Patient left the [facility] ambulatory in no sign of distress.

## 2019-08-10 NOTE — PROGRESS NOTES
"Pharmacy Chemotherapy Verification    Patient Name: Tima Rizzo   Dx: Malignant neoplasm of the fundus    Protocol: Fluorouracil/Leucovorin/OXALIplatin/DOCEtaxel (FLOT)       *Dosing Reference*  DOCEtaxel 50 mg/m2 IV over 60 minutes on Day 1  OXALIplatin 85 mg/m2 IV over 2 hours on Day 1 concurrent with   Leucovorin 200 mg/m2 IV over 2 hours on Day 1 followed by  Fluorouracil 2600 mg/m2 IV continuous infusion over 24 hours on Day 1  14 day cycle for 4 cycles preoperatively and 4 cycles postoperatively for a total of 8 cycles  NCCN Guidelines for Gastric Cancer.V.1.2019  Yanira DORSEY, et al. Lancet Oncol. 2016;178(12):7522-2881    Allergies:  Patient has no known allergies.     /88   Pulse 92   Temp 36.9 °C (98.4 °F) (Temporal)   Resp 18   Ht 1.68 m (5' 6.14\") Comment: took 1 inch off of shoes  Wt 63.8 kg (140 lb 10.5 oz)   SpO2 98%   BMI 22.60 kg/m²  Body surface area is 1.73 meters squared.    Labs 8/10/19  ANC 7100 Hgb 10.7 Plt 347k  SCr 0.85 CrCl 94.9 mL/min   AST/ALT/AP = 12/12/48 Tbili = 0.3     Drug Order   (Drug name, dose, route, IV Fluid & volume, frequency, number of doses) Cycle 3 - delayed due to neutropenia  Previous treatment: C2 7/21/19     Medication = DOCEtaxel (Taxotere)  Base Dose = 50 mg/m2  Calc Dose: Base Dose x 1.73 m2 = 86.5 mg  Final Dose = 86.6 mg  Route = IV  Fluid & Volume =  mL  Admin Duration = Over 60 minutes          <10% difference, OK to treat with final dose    Medication = OXALIplatin (Eloxatin)   Base Dose = 85 mg/m2  Calc Dose: Base Dose x 1.73 m2 = 147.05 mg  Final Dose = 147.05 mg  Route = IV  Fluid & Volume = D5W 250 mL  Admin Duration = Over 120 minutes   Run concurrently with leucovorin        <10% difference, OK to treat with final dose    Medication = Leucovorin   Base Dose = 200 mg/m2  Calc Dose:Base Dose x 1.73 m2 = 346 mg  Final Dose = 346 mg  Route = IV  Fluid & Volume = D5W 250 mL  Admin Duration = Over 120 minutes   Run concurrently with " oxaliplatin       <10% difference, OK to treat with final dose    Medication = Fluorouracil (5-FU)  Base Dose = 2600 mg/m2  Calc Dose: Base Dose x 1.73 m2 = 4498 mg  Final Dose = 4500 mg  Route = IV  Fluid & Volume = 50 mg/mL; 90 mL (+ 3 mLs)  Admin Duration = Over 24 hours @ 3.8 mL/hr          <10% difference, OK to treat with final dose      By my signature below, I confirm this process was performed independently with the BSA and all final chemotherapy dosing calculations congruent. I have reviewed the above chemotherapy order and that my calculation of the final dose and BSA (when applicable) corroborate those calculations of the  pharmacist. Discrepancies of 10% or greater in the written dose have been addressed and documented within the EPIC Progress notes.    Martine Steiner, PharmD, BCOP

## 2019-08-11 ENCOUNTER — OUTPATIENT INFUSION SERVICES (OUTPATIENT)
Dept: ONCOLOGY | Facility: MEDICAL CENTER | Age: 49
End: 2019-08-11
Attending: INTERNAL MEDICINE
Payer: MEDICARE

## 2019-08-11 VITALS
TEMPERATURE: 98.6 F | OXYGEN SATURATION: 96 % | RESPIRATION RATE: 18 BRPM | DIASTOLIC BLOOD PRESSURE: 76 MMHG | HEART RATE: 100 BPM | SYSTOLIC BLOOD PRESSURE: 126 MMHG

## 2019-08-11 DIAGNOSIS — C16.2 MALIGNANT NEOPLASM OF BODY OF STOMACH (HCC): ICD-10-CM

## 2019-08-11 PROCEDURE — 96377 APPLICATON ON-BODY INJECTOR: CPT

## 2019-08-11 PROCEDURE — 96523 IRRIG DRUG DELIVERY DEVICE: CPT

## 2019-08-11 PROCEDURE — 700111 HCHG RX REV CODE 636 W/ 250 OVERRIDE (IP): Performed by: INTERNAL MEDICINE

## 2019-08-11 PROCEDURE — 700111 HCHG RX REV CODE 636 W/ 250 OVERRIDE (IP): Mod: JG | Performed by: NURSE PRACTITIONER

## 2019-08-11 RX ORDER — 0.9 % SODIUM CHLORIDE 0.9 %
20 VIAL (ML) INJECTION PRN
Status: DISCONTINUED | OUTPATIENT
Start: 2019-08-11 | End: 2019-08-11 | Stop reason: HOSPADM

## 2019-08-11 RX ADMIN — HEPARIN 500 UNITS: 100 SYRINGE at 16:17

## 2019-08-11 RX ADMIN — PEGFILGRASTIM 6 MG: KIT SUBCUTANEOUS at 16:30

## 2019-08-12 NOTE — PROGRESS NOTES
"Patient seen today for port de-access (post home infusion of 5FU).  Pump off and chemo bag empty. Positive blood return and port flushed per protocol.  Port de-accessed, needle intact, and gauze dressing applied. Teaching on Neulasta OnPro provided with good patient understanding. Discussed side effects of drug including bone pain. OnPro 6mg applied to back of left upper arm. Confirmed \"fill\" line, securement, and needle injector. Tolerated well and without complaint.  Sharps container provided. Discharged in good condition, ambulatory.   "

## 2019-08-13 RX ORDER — SODIUM CHLORIDE 9 MG/ML
1000 INJECTION, SOLUTION INTRAVENOUS ONCE
Status: CANCELLED | OUTPATIENT
Start: 2019-08-13

## 2019-08-14 ENCOUNTER — APPOINTMENT (OUTPATIENT)
Dept: RADIOLOGY | Facility: MEDICAL CENTER | Age: 49
DRG: 392 | End: 2019-08-14
Attending: EMERGENCY MEDICINE
Payer: MEDICARE

## 2019-08-14 ENCOUNTER — HOSPITAL ENCOUNTER (INPATIENT)
Facility: MEDICAL CENTER | Age: 49
LOS: 1 days | DRG: 392 | End: 2019-08-15
Attending: EMERGENCY MEDICINE | Admitting: HOSPITALIST
Payer: MEDICARE

## 2019-08-14 PROBLEM — R11.2 INTRACTABLE VOMITING WITH NAUSEA: Status: ACTIVE | Noted: 2019-08-14

## 2019-08-14 PROBLEM — D72.825 BANDEMIA: Status: ACTIVE | Noted: 2019-08-14

## 2019-08-14 LAB
ALBUMIN SERPL BCP-MCNC: 3.8 G/DL (ref 3.2–4.9)
ALBUMIN/GLOB SERPL: 1.1 G/DL
ALP SERPL-CCNC: 77 U/L (ref 30–99)
ALT SERPL-CCNC: 12 U/L (ref 2–50)
ANION GAP SERPL CALC-SCNC: 10 MMOL/L (ref 0–11.9)
APPEARANCE UR: CLEAR
AST SERPL-CCNC: 14 U/L (ref 12–45)
BASOPHILS # BLD AUTO: 0 % (ref 0–1.8)
BASOPHILS # BLD: 0 K/UL (ref 0–0.12)
BILIRUB SERPL-MCNC: 0.5 MG/DL (ref 0.1–1.5)
BILIRUB UR QL STRIP.AUTO: NEGATIVE
BUN SERPL-MCNC: 16 MG/DL (ref 8–22)
CALCIUM SERPL-MCNC: 9.5 MG/DL (ref 8.5–10.5)
CHLORIDE SERPL-SCNC: 102 MMOL/L (ref 96–112)
CO2 SERPL-SCNC: 22 MMOL/L (ref 20–33)
COLOR UR: YELLOW
CREAT SERPL-MCNC: 0.83 MG/DL (ref 0.5–1.4)
EOSINOPHIL # BLD AUTO: 0 K/UL (ref 0–0.51)
EOSINOPHIL NFR BLD: 0 % (ref 0–6.9)
ERYTHROCYTE [DISTWIDTH] IN BLOOD BY AUTOMATED COUNT: 45.1 FL (ref 35.9–50)
GLOBULIN SER CALC-MCNC: 3.5 G/DL (ref 1.9–3.5)
GLUCOSE SERPL-MCNC: 105 MG/DL (ref 65–99)
GLUCOSE UR STRIP.AUTO-MCNC: NEGATIVE MG/DL
HCT VFR BLD AUTO: 32 % (ref 42–52)
HGB BLD-MCNC: 10.9 G/DL (ref 14–18)
KETONES UR STRIP.AUTO-MCNC: 15 MG/DL
LACTATE BLD-SCNC: 1.6 MMOL/L (ref 0.5–2)
LEUKOCYTE ESTERASE UR QL STRIP.AUTO: NEGATIVE
LIPASE SERPL-CCNC: 35 U/L (ref 11–82)
LYMPHOCYTES # BLD AUTO: 1.21 K/UL (ref 1–4.8)
LYMPHOCYTES NFR BLD: 1.7 % (ref 22–41)
MAGNESIUM SERPL-MCNC: 2 MG/DL (ref 1.5–2.5)
MANUAL DIFF BLD: NORMAL
MCH RBC QN AUTO: 30.1 PG (ref 27–33)
MCHC RBC AUTO-ENTMCNC: 34.1 G/DL (ref 33.7–35.3)
MCV RBC AUTO: 88.4 FL (ref 81.4–97.8)
METAMYELOCYTES NFR BLD MANUAL: 0.9 %
MICRO URNS: ABNORMAL
MONOCYTES # BLD AUTO: 0.64 K/UL (ref 0–0.85)
MONOCYTES NFR BLD AUTO: 0.9 % (ref 0–13.4)
MORPHOLOGY BLD-IMP: NORMAL
NEUTROPHILS # BLD AUTO: 68.9 K/UL (ref 1.82–7.42)
NEUTROPHILS NFR BLD: 91.2 % (ref 44–72)
NEUTS BAND NFR BLD MANUAL: 5.3 % (ref 0–10)
NITRITE UR QL STRIP.AUTO: NEGATIVE
NRBC # BLD AUTO: 0 K/UL
NRBC BLD-RTO: 0 /100 WBC
PATH REV: NORMAL
PH UR STRIP.AUTO: 6.5 [PH] (ref 5–8)
PLATELET # BLD AUTO: 275 K/UL (ref 164–446)
PLATELET BLD QL SMEAR: NORMAL
PMV BLD AUTO: 9.3 FL (ref 9–12.9)
POTASSIUM SERPL-SCNC: 3.7 MMOL/L (ref 3.6–5.5)
PROT SERPL-MCNC: 7.3 G/DL (ref 6–8.2)
PROT UR QL STRIP: NEGATIVE MG/DL
RBC # BLD AUTO: 3.62 M/UL (ref 4.7–6.1)
RBC BLD AUTO: NORMAL
RBC UR QL AUTO: NEGATIVE
SODIUM SERPL-SCNC: 134 MMOL/L (ref 135–145)
SP GR UR STRIP.AUTO: 1.01
UROBILINOGEN UR STRIP.AUTO-MCNC: 0.2 MG/DL
WBC # BLD AUTO: 71.4 K/UL (ref 4.8–10.8)

## 2019-08-14 PROCEDURE — 700105 HCHG RX REV CODE 258: Performed by: EMERGENCY MEDICINE

## 2019-08-14 PROCEDURE — 700117 HCHG RX CONTRAST REV CODE 255: Performed by: EMERGENCY MEDICINE

## 2019-08-14 PROCEDURE — 80500 HCHG CLINICAL PATH CONSULT-LIMITED: CPT

## 2019-08-14 PROCEDURE — 83690 ASSAY OF LIPASE: CPT

## 2019-08-14 PROCEDURE — 96361 HYDRATE IV INFUSION ADD-ON: CPT

## 2019-08-14 PROCEDURE — 770001 HCHG ROOM/CARE - MED/SURG/GYN PRIV*

## 2019-08-14 PROCEDURE — 99285 EMERGENCY DEPT VISIT HI MDM: CPT

## 2019-08-14 PROCEDURE — 700111 HCHG RX REV CODE 636 W/ 250 OVERRIDE (IP)

## 2019-08-14 PROCEDURE — 83735 ASSAY OF MAGNESIUM: CPT

## 2019-08-14 PROCEDURE — 80053 COMPREHEN METABOLIC PANEL: CPT

## 2019-08-14 PROCEDURE — 96375 TX/PRO/DX INJ NEW DRUG ADDON: CPT

## 2019-08-14 PROCEDURE — 85027 COMPLETE CBC AUTOMATED: CPT

## 2019-08-14 PROCEDURE — 99221 1ST HOSP IP/OBS SF/LOW 40: CPT | Mod: AI | Performed by: HOSPITALIST

## 2019-08-14 PROCEDURE — 85007 BL SMEAR W/DIFF WBC COUNT: CPT

## 2019-08-14 PROCEDURE — 74177 CT ABD & PELVIS W/CONTRAST: CPT

## 2019-08-14 PROCEDURE — 83605 ASSAY OF LACTIC ACID: CPT

## 2019-08-14 PROCEDURE — A9270 NON-COVERED ITEM OR SERVICE: HCPCS | Performed by: HOSPITALIST

## 2019-08-14 PROCEDURE — 700111 HCHG RX REV CODE 636 W/ 250 OVERRIDE (IP): Performed by: EMERGENCY MEDICINE

## 2019-08-14 PROCEDURE — 81003 URINALYSIS AUTO W/O SCOPE: CPT

## 2019-08-14 PROCEDURE — 96374 THER/PROPH/DIAG INJ IV PUSH: CPT | Mod: XU

## 2019-08-14 PROCEDURE — 700101 HCHG RX REV CODE 250: Performed by: HOSPITALIST

## 2019-08-14 PROCEDURE — 36415 COLL VENOUS BLD VENIPUNCTURE: CPT

## 2019-08-14 PROCEDURE — 700111 HCHG RX REV CODE 636 W/ 250 OVERRIDE (IP): Performed by: HOSPITALIST

## 2019-08-14 PROCEDURE — 700102 HCHG RX REV CODE 250 W/ 637 OVERRIDE(OP): Performed by: HOSPITALIST

## 2019-08-14 PROCEDURE — 87040 BLOOD CULTURE FOR BACTERIA: CPT

## 2019-08-14 RX ORDER — PROMETHAZINE HYDROCHLORIDE 25 MG/1
12.5-25 TABLET ORAL EVERY 4 HOURS PRN
Status: DISCONTINUED | OUTPATIENT
Start: 2019-08-14 | End: 2019-08-15 | Stop reason: HOSPADM

## 2019-08-14 RX ORDER — BISACODYL 10 MG
10 SUPPOSITORY, RECTAL RECTAL
Status: DISCONTINUED | OUTPATIENT
Start: 2019-08-14 | End: 2019-08-15 | Stop reason: HOSPADM

## 2019-08-14 RX ORDER — AMOXICILLIN 250 MG
2 CAPSULE ORAL 2 TIMES DAILY
Status: DISCONTINUED | OUTPATIENT
Start: 2019-08-14 | End: 2019-08-15 | Stop reason: HOSPADM

## 2019-08-14 RX ORDER — LORAZEPAM 2 MG/ML
1 INJECTION INTRAMUSCULAR EVERY 4 HOURS PRN
Status: DISCONTINUED | OUTPATIENT
Start: 2019-08-14 | End: 2019-08-15 | Stop reason: HOSPADM

## 2019-08-14 RX ORDER — OXYCODONE HYDROCHLORIDE 5 MG/1
7.5 TABLET ORAL
Status: DISCONTINUED | OUTPATIENT
Start: 2019-08-14 | End: 2019-08-15 | Stop reason: HOSPADM

## 2019-08-14 RX ORDER — POLYETHYLENE GLYCOL 3350 17 G/17G
1 POWDER, FOR SOLUTION ORAL
Status: DISCONTINUED | OUTPATIENT
Start: 2019-08-14 | End: 2019-08-15 | Stop reason: HOSPADM

## 2019-08-14 RX ORDER — SODIUM CHLORIDE AND POTASSIUM CHLORIDE 150; 450 MG/100ML; MG/100ML
INJECTION, SOLUTION INTRAVENOUS CONTINUOUS
Status: DISCONTINUED | OUTPATIENT
Start: 2019-08-14 | End: 2019-08-15 | Stop reason: HOSPADM

## 2019-08-14 RX ORDER — OMEPRAZOLE 20 MG/1
40 CAPSULE, DELAYED RELEASE ORAL DAILY
Status: DISCONTINUED | OUTPATIENT
Start: 2019-08-14 | End: 2019-08-15 | Stop reason: HOSPADM

## 2019-08-14 RX ORDER — ONDANSETRON 4 MG/1
4 TABLET, ORALLY DISINTEGRATING ORAL EVERY 4 HOURS PRN
Status: DISCONTINUED | OUTPATIENT
Start: 2019-08-14 | End: 2019-08-15 | Stop reason: HOSPADM

## 2019-08-14 RX ORDER — DIPHENHYDRAMINE HYDROCHLORIDE 50 MG/ML
INJECTION INTRAMUSCULAR; INTRAVENOUS
Status: COMPLETED
Start: 2019-08-14 | End: 2019-08-14

## 2019-08-14 RX ORDER — LORAZEPAM 2 MG/ML
1 INJECTION INTRAMUSCULAR ONCE
Status: COMPLETED | OUTPATIENT
Start: 2019-08-14 | End: 2019-08-14

## 2019-08-14 RX ORDER — SODIUM CHLORIDE 9 MG/ML
1000 INJECTION, SOLUTION INTRAVENOUS ONCE
Status: COMPLETED | OUTPATIENT
Start: 2019-08-14 | End: 2019-08-14

## 2019-08-14 RX ORDER — AMOXICILLIN AND CLAVULANATE POTASSIUM 875; 125 MG/1; MG/1
1 TABLET, FILM COATED ORAL 2 TIMES DAILY
Status: ON HOLD | COMMUNITY
Start: 2019-08-01 | End: 2019-08-15

## 2019-08-14 RX ORDER — MORPHINE SULFATE 15 MG/1
15 TABLET, FILM COATED, EXTENDED RELEASE ORAL 2 TIMES DAILY
Status: DISCONTINUED | OUTPATIENT
Start: 2019-08-14 | End: 2019-08-15 | Stop reason: HOSPADM

## 2019-08-14 RX ORDER — DIPHENHYDRAMINE HYDROCHLORIDE 50 MG/ML
50 INJECTION INTRAMUSCULAR; INTRAVENOUS ONCE
Status: COMPLETED | OUTPATIENT
Start: 2019-08-14 | End: 2019-08-14

## 2019-08-14 RX ORDER — LISINOPRIL 10 MG/1
10 TABLET ORAL DAILY
Status: DISCONTINUED | OUTPATIENT
Start: 2019-08-14 | End: 2019-08-15 | Stop reason: HOSPADM

## 2019-08-14 RX ORDER — PROMETHAZINE HYDROCHLORIDE 25 MG/1
12.5-25 SUPPOSITORY RECTAL EVERY 4 HOURS PRN
Status: DISCONTINUED | OUTPATIENT
Start: 2019-08-14 | End: 2019-08-15 | Stop reason: HOSPADM

## 2019-08-14 RX ORDER — ONDANSETRON 2 MG/ML
4 INJECTION INTRAMUSCULAR; INTRAVENOUS EVERY 4 HOURS PRN
Status: DISCONTINUED | OUTPATIENT
Start: 2019-08-14 | End: 2019-08-15 | Stop reason: HOSPADM

## 2019-08-14 RX ORDER — DIPHENHYDRAMINE HYDROCHLORIDE 50 MG/ML
50 INJECTION INTRAMUSCULAR; INTRAVENOUS EVERY 6 HOURS PRN
Status: DISCONTINUED | OUTPATIENT
Start: 2019-08-14 | End: 2019-08-15 | Stop reason: HOSPADM

## 2019-08-14 RX ADMIN — MORPHINE SULFATE 15 MG: 15 TABLET, EXTENDED RELEASE ORAL at 14:27

## 2019-08-14 RX ADMIN — OMEPRAZOLE 40 MG: 20 CAPSULE, DELAYED RELEASE ORAL at 14:27

## 2019-08-14 RX ADMIN — DIPHENHYDRAMINE HYDROCHLORIDE 50 MG: 50 INJECTION INTRAMUSCULAR; INTRAVENOUS at 08:00

## 2019-08-14 RX ADMIN — ONDANSETRON 4 MG: 2 INJECTION INTRAMUSCULAR; INTRAVENOUS at 14:28

## 2019-08-14 RX ADMIN — POTASSIUM CHLORIDE AND SODIUM CHLORIDE: 450; 150 INJECTION, SOLUTION INTRAVENOUS at 16:29

## 2019-08-14 RX ADMIN — LISINOPRIL 10 MG: 10 TABLET ORAL at 14:27

## 2019-08-14 RX ADMIN — SENNOSIDES, DOCUSATE SODIUM 2 TABLET: 50; 8.6 TABLET, FILM COATED ORAL at 18:55

## 2019-08-14 RX ADMIN — MORPHINE SULFATE 15 MG: 15 TABLET, EXTENDED RELEASE ORAL at 18:55

## 2019-08-14 RX ADMIN — PROCHLORPERAZINE EDISYLATE 10 MG: 5 INJECTION INTRAMUSCULAR; INTRAVENOUS at 06:30

## 2019-08-14 RX ADMIN — ONDANSETRON 4 MG: 4 TABLET, ORALLY DISINTEGRATING ORAL at 20:11

## 2019-08-14 RX ADMIN — LORAZEPAM 1 MG: 2 INJECTION INTRAMUSCULAR; INTRAVENOUS at 10:08

## 2019-08-14 RX ADMIN — SODIUM CHLORIDE 1000 ML: 9 INJECTION, SOLUTION INTRAVENOUS at 06:33

## 2019-08-14 RX ADMIN — IOHEXOL 100 ML: 350 INJECTION, SOLUTION INTRAVENOUS at 11:42

## 2019-08-14 ASSESSMENT — ENCOUNTER SYMPTOMS
BLOOD IN STOOL: 0
DIARRHEA: 0
DOUBLE VISION: 0
BACK PAIN: 0
SHORTNESS OF BREATH: 0
NAUSEA: 1
WEIGHT LOSS: 1
LOSS OF CONSCIOUSNESS: 0
BLURRED VISION: 0
ABDOMINAL PAIN: 0
HEADACHES: 0
CONSTIPATION: 0
PALPITATIONS: 0
VOMITING: 0
DIZZINESS: 0
SORE THROAT: 0
COUGH: 0
FEVER: 0
CHILLS: 0

## 2019-08-14 ASSESSMENT — LIFESTYLE VARIABLES
EVER FELT BAD OR GUILTY ABOUT YOUR DRINKING: NO
TOTAL SCORE: 0
HOW MANY TIMES IN THE PAST YEAR HAVE YOU HAD 5 OR MORE DRINKS IN A DAY: 0
ON A TYPICAL DAY WHEN YOU DRINK ALCOHOL HOW MANY DRINKS DO YOU HAVE: 0
HAVE PEOPLE ANNOYED YOU BY CRITICIZING YOUR DRINKING: NO
TOTAL SCORE: 0
EVER_SMOKED: YES
CONSUMPTION TOTAL: NEGATIVE
EVER HAD A DRINK FIRST THING IN THE MORNING TO STEADY YOUR NERVES TO GET RID OF A HANGOVER: NO
AVERAGE NUMBER OF DAYS PER WEEK YOU HAVE A DRINK CONTAINING ALCOHOL: 0
TOTAL SCORE: 0
DOES PATIENT WANT TO STOP DRINKING: NO
ALCOHOL_USE: NO
HAVE YOU EVER FELT YOU SHOULD CUT DOWN ON YOUR DRINKING: NO

## 2019-08-14 ASSESSMENT — COGNITIVE AND FUNCTIONAL STATUS - GENERAL
SUGGESTED CMS G CODE MODIFIER DAILY ACTIVITY: CH
SUGGESTED CMS G CODE MODIFIER MOBILITY: CH
DAILY ACTIVITIY SCORE: 24
MOBILITY SCORE: 24

## 2019-08-14 ASSESSMENT — PATIENT HEALTH QUESTIONNAIRE - PHQ9
5. POOR APPETITE OR OVEREATING: MORE THAN HALF THE DAYS
7. TROUBLE CONCENTRATING ON THINGS, SUCH AS READING THE NEWSPAPER OR WATCHING TELEVISION: SEVERAL DAYS
8. MOVING OR SPEAKING SO SLOWLY THAT OTHER PEOPLE COULD HAVE NOTICED. OR THE OPPOSITE, BEING SO FIGETY OR RESTLESS THAT YOU HAVE BEEN MOVING AROUND A LOT MORE THAN USUAL: NOT AT ALL
3. TROUBLE FALLING OR STAYING ASLEEP OR SLEEPING TOO MUCH: NOT AT ALL
9. THOUGHTS THAT YOU WOULD BE BETTER OFF DEAD, OR OF HURTING YOURSELF: NOT AT ALL
1. LITTLE INTEREST OR PLEASURE IN DOING THINGS: NOT AT ALL
SUM OF ALL RESPONSES TO PHQ9 QUESTIONS 1 AND 2: 1
2. FEELING DOWN, DEPRESSED, IRRITABLE, OR HOPELESS: SEVERAL DAYS
SUM OF ALL RESPONSES TO PHQ QUESTIONS 1-9: 6
6. FEELING BAD ABOUT YOURSELF - OR THAT YOU ARE A FAILURE OR HAVE LET YOURSELF OR YOUR FAMILY DOWN: NOT AL ALL
4. FEELING TIRED OR HAVING LITTLE ENERGY: MORE THAN HALF THE DAYS

## 2019-08-14 NOTE — ED TRIAGE NOTES
"Tima Rizzo  48 y.o. male  Chief Complaint   Patient presents with   • N/V     Family reports hx of stomach cancer. Since Monday, pt has been unable to keep any fluids or solids down.      Pt wheeled to triage for above complaint.      Last chemo was Saturday. Pt not responding to RN's questions in triage and intermittently moaning/requesting water. Family reports this is abnormal and he is generally more alert. Family states he usually takes his pain medication at 4am and thinks this may be why he is not responding to questions appropriately.     Charge RN notified of patient.     Pt wheeled to RED 7.     Blood Pressure: 126/87, Pulse: 89, Respiration: 20, Temperature: 36.8 °C (98.2 °F), Height: 167.6 cm (5' 6\"), Weight: 59.9 kg (132 lb), Pulse Oximetry: 98 %, O2 Delivery: None (Room Air)    "

## 2019-08-14 NOTE — H&P
Hospital Medicine History & Physical Note    Date of Service  8/14/2019    Primary Care Physician  Jenae Steward M.D.    Consultants  Oncology    Code Status  Full    Chief Complaint  Nausea    History of Presenting Illness  48 y.o. male who presented 8/14/2019 with a history of gastric carcinoma T4b N3A MX followed by Dr. Keyes and currently on FLOT chemotherapy with his last session this Saturday.  He has had nausea following his chemotherapy on the 2 previous cycles however this episode has been more intense.  He is been unable to keep anything down now for about 2 to 3 days other than some electrolyte fluids.  He however does not not complain of any abdominal pain.  He has not had any fever or chills.  He denies diarrhea, dysuria, abdominal flank or chest pain, vomiting.    Review of Systems  Review of Systems   Constitutional: Positive for malaise/fatigue and weight loss. Negative for chills and fever.   HENT: Negative for nosebleeds and sore throat.    Eyes: Negative for blurred vision and double vision.   Respiratory: Negative for cough and shortness of breath.    Cardiovascular: Negative for chest pain, palpitations and leg swelling.   Gastrointestinal: Positive for nausea. Negative for abdominal pain, blood in stool, constipation, diarrhea, melena and vomiting.   Genitourinary: Negative for dysuria and urgency.   Musculoskeletal: Negative for back pain.   Skin: Negative for rash.   Neurological: Negative for dizziness, loss of consciousness and headaches.       Past Medical History   has a past medical history of Anesthesia, Anxiety, Bell palsy (2000; 2010), Cancer ([facility]) (2019), Enlarged prostate, History of positive PPD, untreated, Hypertension, Numbness of arm, Pain, Prediabetes, Psychiatric problem, Snoring, and Urinary bladder disorder.    Surgical History   has a past surgical history that includes other surgical procedure (2014); pr upper gi endoscopy,biopsy (6/12/2019); pr upper gi  endoscopy,w/dilat,gastric out (6/12/2019); gastroscopy (6/12/2019); egd w/endoscopic ultrasound (6/12/2019); egd with asp/bx (6/12/2019); and cath placement (Right, 6/13/2019).     Family History  Reviewed but noncontributory    Social History   reports that he has been smoking cigarettes. He has a 15.00 pack-year smoking history. He has never used smokeless tobacco. He reports that he drinks alcohol. He reports that he has current or past drug history. Drug: Marijuana.    Allergies  No Known Allergies    Medications  Prior to Admission Medications   Prescriptions Last Dose Informant Patient Reported? Taking?   Morphine Sulfate ER 15 MG Tablet Extended Release 12 hour Abuse-Deterrent 8/10/2019 at PM Family Member Yes Yes   Sig: Take 15 mg by mouth 2 Times a Day.   amoxicillin-clavulanate (AUGMENTIN) 875-125 MG Tab 8/7/2019 at Complete Family Member Yes Yes   Sig: Take 1 Tab by mouth 2 times a day. Pt completed a 7 day course of AUGMENTIN on 8/07   docusate sodium (COLACE) 50 MG Cap 8/10/2019 at AM Family Member Yes No   Sig: Take 50 mg by mouth every day.   lisinopril (PRINIVIL) 10 MG Tab 8/10/2019 at AM Family Member Yes No   Sig: Take 10 mg by mouth every day.   omeprazole (PRILOSEC) 40 MG delayed-release capsule 8/10/2019 at AM Family Member No No   Sig: Take 1 Cap by mouth every day.   ondansetron (ZOFRAN ODT) 8 MG TABLET DISPERSIBLE 8/14/2019 at 0545 Family Member Yes No   Sig: Take 8 mg by mouth every 8 hours as needed for Nausea.   oxycodone (OXY-IR) 15 MG immediate release tablet 8/13/2019 at PM Family Member Yes No   Sig: Take 7.5 mg by mouth every 3 hours as needed for Severe Pain.   promethazine (PHENERGAN) 25 MG Tab 8/13/2019 at PM Family Member No No   Sig: Take 0.5 Tabs by mouth every 6 hours as needed.      Facility-Administered Medications: None       Physical Exam  Temp:  [36.8 °C (98.2 °F)] 36.8 °C (98.2 °F)  Pulse:  [68-89] 72  Resp:  [11-20] 16  BP: (114-127)/(67-87) 127/73  SpO2:  [97 %-100 %]  100 %    Physical Exam   Constitutional: He is oriented to person, place, and time. He appears well-developed and well-nourished. No distress.   HENT:   Head: Normocephalic and atraumatic.   Nose: Nose normal.   Mouth/Throat: Oropharynx is clear and moist.   Eyes: Conjunctivae are normal.   Neck: No JVD present.   Cardiovascular: Normal rate. Exam reveals no gallop.   No murmur heard.  Pulmonary/Chest: Effort normal. No stridor. No respiratory distress. He has no wheezes. He has no rales.   Abdominal: Soft. There is no tenderness. There is no rebound and no guarding.   Musculoskeletal: He exhibits no edema.   Neurological: He is alert and oriented to person, place, and time.   Skin: Skin is warm and dry. No rash noted. He is not diaphoretic. There is pallor.   Psychiatric: He has a normal mood and affect. Thought content normal.   Nursing note and vitals reviewed.      Laboratory:  Recent Labs     08/14/19  0636   WBC 71.4*   RBC 3.62*   HEMOGLOBIN 10.9*   HEMATOCRIT 32.0*   MCV 88.4   MCH 30.1   MCHC 34.1   RDW 45.1   PLATELETCT 275   MPV 9.3     Recent Labs     08/14/19  0636   SODIUM 134*   POTASSIUM 3.7   CHLORIDE 102   CO2 22   GLUCOSE 105*   BUN 16   CREATININE 0.83   CALCIUM 9.5     Recent Labs     08/14/19  0636   ALTSGPT 12   ASTSGOT 14   ALKPHOSPHAT 77   TBILIRUBIN 0.5   LIPASE 35   GLUCOSE 105*         No results for input(s): NTPROBNP in the last 72 hours.      No results for input(s): TROPONINT in the last 72 hours.    Urinalysis:    Recent Labs     08/14/19  0850   SPECGRAVITY 1.010   GLUCOSEUR Negative   KETONES 15*   NITRITE Negative   LEUKESTERAS Negative        Imaging:  CT-ABDOMEN-PELVIS WITH    (Results Pending)         Assessment/Plan:  I anticipate this patient will require at least two midnights for appropriate medical management, necessitating inpatient admission.    Intractable vomiting with nausea  Assessment & Plan  Likely chemotherapy-induced though the patient could be having a gastric  obstruction as well.  His abdominal exam is benign however given his history and the need for follow-up CT by oncology regardless, we will check a CT now to rule out any obstructive etiology.  Start IV fluids containing dextrose and potassium  Follow daily BMP  Patient has had some anxiety and agitation following administration of promethazine: Question extraparametal side effect  Zofran has been ineffective  Trial Ativan and Benadryl for nausea    Bandemia  Assessment & Plan  Patient has a significant leukocytosis in the 70s.  He has recently been given Granix which would certainly explain it.  He does not appear to be infected at this time.  We will follow his white count and his clinical exam.  We will also check a CT for any other acute intra-abdominal process    Marijuana use- (present on admission)  Assessment & Plan  Patient does not appear to be using significant amounts I doubt this is cyclical vomiting.    HTN (hypertension)- (present on admission)  Assessment & Plan  Maintained on lisinopril.  We will continue this with parameters.    Gastric cancer (HCC)- (present on admission)  Assessment & Plan  On FLOT therapy per Dr. Keyes.  Case has been discussed with him today.  Status post Granix  Check CT for further staging purposes.  Consult oncology      VTE prophylaxis: enoxaparin

## 2019-08-14 NOTE — ED NOTES
"Pts family informed staff that he disconnected all the monitoring equipment and walked out. Security found pt and he was escorted back to room. Pt states \"I want to go, I need to eat.\" ERP notified. Pt agrees to stay. Medicated per MAR. Warm blankets provided. Family instructed to call RN with any pt needs.  "

## 2019-08-14 NOTE — ASSESSMENT & PLAN NOTE
On FLOT therapy per Dr. Keyes.  Case has been discussed with him today.  Status post Granix  Check CT for further staging purposes.  Consult oncology

## 2019-08-14 NOTE — ED NOTES
from Lab called with critical result of lactic acid 71.4 at 0706. Critical lab result read back to .   Dr. Wellington notified of critical lab result at 0706.  Critical lab result read back by Dr. Wellington.

## 2019-08-14 NOTE — ED NOTES
Diet tray called for patient. Pt and mother updated on POC and room assignment.  RN apologizes for wait.

## 2019-08-14 NOTE — ED PROVIDER NOTES
ED Provider Note    CHIEF COMPLAINT  Chief Complaint   Patient presents with   • N/V     Family reports hx of stomach cancer. Since Monday, pt has been unable to keep any fluids or solids down.        HPI  Tima Rizzo is a 48 y.o. male who presents to the emergency department with nausea and inability to eat.  Patient has a history of gastric cancer.  He is currently undergoing chemotherapy.  He is cared for by Dr. Keyes.  Over the last 4 days he has had persistent nausea.  He has not been able to eat.  He has been able to drink small amounts of liquid.  Had IV infusion at the office yesterday.  Reports still feeling poorly.  He has not had a bowel movement in 1 day.  He continues to pass gas.  He has chronic abdominal pain related to his cancer.  He reports this is unchanged.  He has not noticed any abdominal swelling.  He has not had a fever.  No pain with urination.  No chest pain or shortness of breath.  He has tried taking his Zofran has not had any relief.  His symptoms are severe.    REVIEW OF SYSTEMS  As per HPI, otherwise a 10 point review of systems is negative    PAST MEDICAL HISTORY  Past Medical History:   Diagnosis Date   • Anesthesia     slow to wake up after EGD   • Anxiety    • Bell palsy 2000; 2010    x2; mild left facial droop   • Cancer (HCC) 2019    Gastric   • Enlarged prostate    • History of positive PPD, untreated    • Hypertension    • Numbness of arm     and hands, bilateral   • Pain     neck and arms   • Prediabetes    • Psychiatric problem     depression    • Snoring    • Urinary bladder disorder     frequent urination (enlarged prostate)       SOCIAL HISTORY  Social History     Tobacco Use   • Smoking status: Current Every Day Smoker     Packs/day: 0.50     Years: 30.00     Pack years: 15.00     Types: Cigarettes   • Smokeless tobacco: Never Used   Substance Use Topics   • Alcohol use: Yes     Comment: 2 per year   • Drug use: Yes     Types: Marijuana     Comment: Cannabis, daily        SURGICAL HISTORY  Past Surgical History:   Procedure Laterality Date   • CATH PLACEMENT Right 6/13/2019    Procedure: INSERTION, CATHETER- CEPHALIC POWER PORT  ;  Surgeon: Dickson Simpson M.D.;  Location: [facility];  Service: General   • PB UPPER GI ENDOSCOPY,BIOPSY  6/12/2019    Procedure: GASTROSCOPY, WITH BIOPSY - POSSIBLE;  Surgeon: Bjorn Claudio M.D.;  Location: [facility];  Service: Gastroenterology   • PB UPPER GI ENDOSCOPY,W/DILAT,GASTRIC OUT  6/12/2019    Procedure: GASTROSCOPY, WITH BALLOON DILATION;  Surgeon: Bjorn Claudio M.D.;  Location: [facility];  Service: Gastroenterology   • GASTROSCOPY  6/12/2019    Procedure: GASTROSCOPY;  Surgeon: Bjorn Claudio M.D.;  Location: [facility];  Service: Gastroenterology   • EGD W/ENDOSCOPIC ULTRASOUND  6/12/2019    Procedure: EGD, WITH ENDOSCOPIC US - UPPER, RADIAL;  Surgeon: Bjorn Claudio M.D.;  Location: [facility];  Service: Gastroenterology   • EGD WITH ASP/BX  6/12/2019    Procedure: EGD, WITH ASPIRATION BIOPSY - POSSIBLE;  Surgeon: Bjorn Claudio M.D.;  Location: [facility];  Service: Gastroenterology   • OTHER SURGICAL PROCEDURE  2014    cervical discectomy with plates       CURRENT MEDICATIONS  Home Medications     Reviewed by Ritesh Guido R.N. (Registered Nurse) on 08/14/19 at 0600  Med List Status: Partial   Medication Last Dose Status   acetaminophen (TYLENOL) 325 MG Tab  Active   docusate sodium (COLACE) 100 MG Cap  Active   lisinopril (PRINIVIL) 10 MG Tab  Active   morphine (MS IR) 15 MG tablet  Active   omeprazole (PRILOSEC) 40 MG delayed-release capsule  Active   ondansetron (ZOFRAN ODT) 8 MG TABLET DISPERSIBLE  Active   oxyCODONE immediate release (ROXICODONE) 10 MG immediate release tablet  Active   promethazine (PHENERGAN) 25 MG Tab  Active                ALLERGIES  No Known Allergies    PHYSICAL EXAM  VITAL SIGNS: /87   Pulse 71  "  Temp 36.8 °C (98.2 °F) (Temporal)   Resp 13   Ht 1.676 m (5' 6\")   Wt 59.9 kg (132 lb)   SpO2 99%   BMI 21.31 kg/m²    Constitutional: Awake and alert.  Moaning and uncomfortable.  Alopecia.  Chronically ill-appearing.  Cachectic  HENT:  Atraumatic, Normocephalic.Oropharynx dry mucus membranes, Nose normal inspection.   Eyes: Normal inspection  Neck: Supple  Cardiovascular: Normal heart rate, Normal rhythm.  Symmetric peripheral pulses.   Thorax & Lungs: No respiratory distress, No wheezing, No rales, No rhonchi, No chest tenderness.   Abdomen: Bowel sounds normal, soft, non-distended, nontender, no mass  Skin: Warm, Dry, No rash.   Back: No tenderness, No CVA tenderness.   Extremities: No clubbing, cyanosis, edema, no Homans or cords   Neurologic: Grossly normal   Psychiatric: Anxious appearing      Labs:  Results for orders placed or performed during the hospital encounter of 08/14/19   CBC WITH DIFFERENTIAL   Result Value Ref Range    WBC 71.4 (HH) 4.8 - 10.8 K/uL    RBC 3.62 (L) 4.70 - 6.10 M/uL    Hemoglobin 10.9 (L) 14.0 - 18.0 g/dL    Hematocrit 32.0 (L) 42.0 - 52.0 %    MCV 88.4 81.4 - 97.8 fL    MCH 30.1 27.0 - 33.0 pg    MCHC 34.1 33.7 - 35.3 g/dL    RDW 45.1 35.9 - 50.0 fL    Platelet Count 275 164 - 446 K/uL    MPV 9.3 9.0 - 12.9 fL    Neutrophils-Polys 91.20 (H) 44.00 - 72.00 %    Lymphocytes 1.70 (L) 22.00 - 41.00 %    Monocytes 0.90 0.00 - 13.40 %    Eosinophils 0.00 0.00 - 6.90 %    Basophils 0.00 0.00 - 1.80 %    Nucleated RBC 0.00 /100 WBC    Neutrophils (Absolute) 68.90 (H) 1.82 - 7.42 K/uL    Lymphs (Absolute) 1.21 1.00 - 4.80 K/uL    Monos (Absolute) 0.64 0.00 - 0.85 K/uL    Eos (Absolute) 0.00 0.00 - 0.51 K/uL    Baso (Absolute) 0.00 0.00 - 0.12 K/uL    NRBC (Absolute) 0.00 K/uL   COMP METABOLIC PANEL   Result Value Ref Range    Sodium 134 (L) 135 - 145 mmol/L    Potassium 3.7 3.6 - 5.5 mmol/L    Chloride 102 96 - 112 mmol/L    Co2 22 20 - 33 mmol/L    Anion Gap 10.0 0.0 - 11.9    " Glucose 105 (H) 65 - 99 mg/dL    Bun 16 8 - 22 mg/dL    Creatinine 0.83 0.50 - 1.40 mg/dL    Calcium 9.5 8.5 - 10.5 mg/dL    AST(SGOT) 14 12 - 45 U/L    ALT(SGPT) 12 2 - 50 U/L    Alkaline Phosphatase 77 30 - 99 U/L    Total Bilirubin 0.5 0.1 - 1.5 mg/dL    Albumin 3.8 3.2 - 4.9 g/dL    Total Protein 7.3 6.0 - 8.2 g/dL    Globulin 3.5 1.9 - 3.5 g/dL    A-G Ratio 1.1 g/dL   URINALYSIS CULTURE, IF INDICATED   Result Value Ref Range    Color Yellow     Character Clear     Specific Gravity 1.010 <1.035    Ph 6.5 5.0 - 8.0    Glucose Negative Negative mg/dL    Ketones 15 (A) Negative mg/dL    Protein Negative Negative mg/dL    Bilirubin Negative Negative    Urobilinogen, Urine 0.2 Negative    Nitrite Negative Negative    Leukocyte Esterase Negative Negative    Occult Blood Negative Negative    Micro Urine Req see below    LIPASE   Result Value Ref Range    Lipase 35 11 - 82 U/L   ESTIMATED GFR   Result Value Ref Range    GFR If African American >60 >60 mL/min/1.73 m 2    GFR If Non African American >60 >60 mL/min/1.73 m 2   LACTIC ACID   Result Value Ref Range    Lactic Acid 1.6 0.5 - 2.0 mmol/L   DIFFERENTIAL MANUAL   Result Value Ref Range    Bands-Stabs 5.30 0.00 - 10.00 %    Metamyelocytes 0.90 %    Manual Diff Status PERFORMED    PERIPHERAL SMEAR REVIEW   Result Value Ref Range    Peripheral Smear Review see below    PATH INTERP HEME   Result Value Ref Range    Interpretation see below     Interpretation B - Hematology See comment     Reviewed By Hematology see below    PLATELET ESTIMATE   Result Value Ref Range    Plt Estimation Normal    MORPHOLOGY   Result Value Ref Range    RBC Morphology Normal        Medications   NS infusion 1,000 mL (0 mL Intravenous Stopped 8/14/19 0800)   prochlorperazine (COMPAZINE) injection 10 mg (10 mg Intravenous Given 8/14/19 0630)   diphenhydrAMINE (BENADRYL) injection 50 mg (50 mg Intravenous Given 8/14/19 0800)   LORazepam (ATIVAN) injection 1 mg (1 mg Intravenous Given 8/14/19  1008)       HYDRATION: Based on the patient's presentation of Acute Vomiting the patient was given IV fluids. IV Hydration was used because oral hydration was not adequate alone. Upon recheck following hydration, the patient was Stable.    COURSE & MEDICAL DECISION MAKING  Patient presents with nausea and inability to tolerate orals.  History of cancer as noted above.  An IV was established.  He was given 1 L of normal saline and Compazine.  He is very anxious given Benadryl.  Subsequent given an additional 1 mg of lorazepam.  Laboratory data was collected and was not terribly remarkable aside from significant leukocytosis which is likely secondary to neutrophil stimulating agent that patient reports he was given recently.    I consulted Dr. Keyes.  Reviewed the case.  Given patient was just at his office for IV fluids and is here now with persistent symptoms and abdominal pain hospital admission for further treatment was recommended.  We will obtain a CT scan of the abdomen and pelvis although he does not appear to have an emergent surgical problem at this time.    Hospitalist paged for admission.    FINAL IMPRESSION  1.  Intractable nausea  2.  Abdominal pain      This dictation was created using voice recognition software. The accuracy of the dictation is limited to the abilities of the software.  The nursing notes were reviewed and certain aspects of this information were incorporated into this note.      Electronically signed by: Andry Wellington, 8/14/2019 6:25 AM

## 2019-08-14 NOTE — ASSESSMENT & PLAN NOTE
Likely chemotherapy-induced though the patient could be having a gastric obstruction as well.  His abdominal exam is benign however given his history and the need for follow-up CT by oncology regardless, we will check a CT now to rule out any obstructive etiology.  Start IV fluids containing dextrose and potassium  Follow daily BMP  Patient has had some anxiety and agitation following administration of promethazine: Question extraparametal side effect  Zofran has been ineffective  Trial Ativan and Benadryl for nausea

## 2019-08-14 NOTE — ASSESSMENT & PLAN NOTE
Patient has a significant leukocytosis in the 70s.  He has recently been given Granix which would certainly explain it.  He does not appear to be infected at this time.  We will follow his white count and his clinical exam.  We will also check a CT for any other acute intra-abdominal process

## 2019-08-14 NOTE — ED NOTES
Med Rec complete per Pt and Pt's family at bedside  Ok per Pt to discuss medications with visitor/s present  Allergies Reviewed    Pt completed a 7 day course of AUGMENTIN on 8/07

## 2019-08-15 VITALS
HEIGHT: 66 IN | HEART RATE: 69 BPM | BODY MASS INDEX: 21.21 KG/M2 | RESPIRATION RATE: 18 BRPM | WEIGHT: 132 LBS | SYSTOLIC BLOOD PRESSURE: 116 MMHG | DIASTOLIC BLOOD PRESSURE: 76 MMHG | TEMPERATURE: 97.9 F | OXYGEN SATURATION: 100 %

## 2019-08-15 LAB
ANION GAP SERPL CALC-SCNC: 7 MMOL/L (ref 0–11.9)
ANISOCYTOSIS BLD QL SMEAR: ABNORMAL
BASOPHILS # BLD AUTO: 0.9 % (ref 0–1.8)
BASOPHILS # BLD: 0.42 K/UL (ref 0–0.12)
BUN SERPL-MCNC: 14 MG/DL (ref 8–22)
CALCIUM SERPL-MCNC: 8.4 MG/DL (ref 8.5–10.5)
CHLORIDE SERPL-SCNC: 104 MMOL/L (ref 96–112)
CO2 SERPL-SCNC: 25 MMOL/L (ref 20–33)
CREAT SERPL-MCNC: 0.91 MG/DL (ref 0.5–1.4)
EOSINOPHIL # BLD AUTO: 0 K/UL (ref 0–0.51)
EOSINOPHIL NFR BLD: 0 % (ref 0–6.9)
ERYTHROCYTE [DISTWIDTH] IN BLOOD BY AUTOMATED COUNT: 48.6 FL (ref 35.9–50)
GLUCOSE SERPL-MCNC: 110 MG/DL (ref 65–99)
HCT VFR BLD AUTO: 28.1 % (ref 42–52)
HGB BLD-MCNC: 8.9 G/DL (ref 14–18)
LYMPHOCYTES # BLD AUTO: 2.41 K/UL (ref 1–4.8)
LYMPHOCYTES NFR BLD: 5.2 % (ref 22–41)
MACROCYTES BLD QL SMEAR: ABNORMAL
MANUAL DIFF BLD: NORMAL
MCH RBC QN AUTO: 29.3 PG (ref 27–33)
MCHC RBC AUTO-ENTMCNC: 31.7 G/DL (ref 33.7–35.3)
MCV RBC AUTO: 92.4 FL (ref 81.4–97.8)
MONOCYTES # BLD AUTO: 0.79 K/UL (ref 0–0.85)
MONOCYTES NFR BLD AUTO: 1.7 % (ref 0–13.4)
MORPHOLOGY BLD-IMP: NORMAL
NEUTROPHILS # BLD AUTO: 42.69 K/UL (ref 1.82–7.42)
NEUTROPHILS NFR BLD: 87.9 % (ref 44–72)
NEUTS BAND NFR BLD MANUAL: 4.3 % (ref 0–10)
NRBC # BLD AUTO: 0 K/UL
NRBC BLD-RTO: 0 /100 WBC
PLATELET # BLD AUTO: 266 K/UL (ref 164–446)
PLATELET BLD QL SMEAR: NORMAL
PMV BLD AUTO: 9.3 FL (ref 9–12.9)
POTASSIUM SERPL-SCNC: 4.1 MMOL/L (ref 3.6–5.5)
RBC # BLD AUTO: 3.04 M/UL (ref 4.7–6.1)
RBC BLD AUTO: PRESENT
SODIUM SERPL-SCNC: 136 MMOL/L (ref 135–145)
WBC # BLD AUTO: 46.3 K/UL (ref 4.8–10.8)

## 2019-08-15 PROCEDURE — 36415 COLL VENOUS BLD VENIPUNCTURE: CPT

## 2019-08-15 PROCEDURE — 700102 HCHG RX REV CODE 250 W/ 637 OVERRIDE(OP): Performed by: HOSPITALIST

## 2019-08-15 PROCEDURE — 85007 BL SMEAR W/DIFF WBC COUNT: CPT

## 2019-08-15 PROCEDURE — 80048 BASIC METABOLIC PNL TOTAL CA: CPT

## 2019-08-15 PROCEDURE — 85027 COMPLETE CBC AUTOMATED: CPT

## 2019-08-15 PROCEDURE — 700101 HCHG RX REV CODE 250: Performed by: HOSPITALIST

## 2019-08-15 PROCEDURE — 99239 HOSP IP/OBS DSCHRG MGMT >30: CPT | Performed by: INTERNAL MEDICINE

## 2019-08-15 PROCEDURE — A9270 NON-COVERED ITEM OR SERVICE: HCPCS | Performed by: HOSPITALIST

## 2019-08-15 PROCEDURE — 700111 HCHG RX REV CODE 636 W/ 250 OVERRIDE (IP): Performed by: HOSPITALIST

## 2019-08-15 RX ADMIN — ONDANSETRON 4 MG: 4 TABLET, ORALLY DISINTEGRATING ORAL at 04:25

## 2019-08-15 RX ADMIN — OMEPRAZOLE 40 MG: 20 CAPSULE, DELAYED RELEASE ORAL at 04:24

## 2019-08-15 RX ADMIN — LISINOPRIL 10 MG: 10 TABLET ORAL at 04:24

## 2019-08-15 RX ADMIN — SENNOSIDES, DOCUSATE SODIUM 2 TABLET: 50; 8.6 TABLET, FILM COATED ORAL at 04:25

## 2019-08-15 RX ADMIN — POTASSIUM CHLORIDE AND SODIUM CHLORIDE: 450; 150 INJECTION, SOLUTION INTRAVENOUS at 00:00

## 2019-08-15 RX ADMIN — ONDANSETRON 4 MG: 4 TABLET, ORALLY DISINTEGRATING ORAL at 11:48

## 2019-08-15 RX ADMIN — OXYCODONE HYDROCHLORIDE 7.5 MG: 5 TABLET ORAL at 07:38

## 2019-08-15 RX ADMIN — OXYCODONE HYDROCHLORIDE 7.5 MG: 5 TABLET ORAL at 11:40

## 2019-08-15 RX ADMIN — MORPHINE SULFATE 15 MG: 15 TABLET, EXTENDED RELEASE ORAL at 04:24

## 2019-08-15 NOTE — DISCHARGE PLANNING
Met with pt. He lives with mom and nephew who can help him at home.  He normally drives but unable to lately. He feels weaker at this time.   Pt would prefer to go home.     Care Transition Team Assessment    Information Source  Orientation : Oriented x 4  Information Given By: Patient  Who is responsible for making decisions for patient? : Patient         Elopement Risk  Legal Hold: No  Ambulatory or Self Mobile in Wheelchair: Yes  Disoriented: No  Psychiatric Symptoms: None  History of Wandering: No  Elopement this Admit: No  Vocalizing Wanting to Leave: No  Displays Behaviors, Body Language Wanting to Leave: No-Not at Risk for Elopement  Elopement Risk: Not at Risk for Elopement    Interdisciplinary Discharge Planning  Does Admitting Nurse Feel This Could be a Complex Discharge?: No  Primary Care Physician: Dr. La Saleem  Lives with - Patient's Self Care Capacity: Parents, Other (Comments)(nephew)  Patient or legal guardian wants to designate a caregiver (see row info): No  Support Systems: Family Member(s), Parent  Housing / Facility: [address] [place] [address]  Do You Take your Prescribed Medications Regularly: Yes  Able to Return to Previous ADL's: Yes  Mobility Issues: Yes  Prior Services: Home-Independent  Patient Expects to be Discharged to:: Home  Assistance Needed: Yes  Durable Medical Equipment: Not Applicable    Discharge Preparedness  What is your plan after discharge?: Home with help  What are your discharge supports?: Parent, Other (comment)(nephew)  Prior Functional Level: Ambulatory, Independent with Activities of Daily Living, Independent with Medication Management  Difficulity with ADLs: None  Difficulity with IADLs: Driving    Functional Assesment  Prior Functional Level: Ambulatory, Independent with Activities of Daily Living, Independent with Medication Management    Finances  Financial Barriers to Discharge: No  Prescription Coverage: Yes    Vision / Hearing Impairment  Vision Impairment : No  Hearing  Impairment : No              Domestic Abuse  Have you ever been the victim of abuse or violence?: No  Physical Abuse or Sexual Abuse: No  Verbal Abuse or Emotional Abuse: No  Possible Abuse Reported to:: Not Applicable              Anticipated Discharge Information  Anticipated discharge disposition: Home

## 2019-08-15 NOTE — PROGRESS NOTES
RN MOBILITY NOTE     Surgery patient?: No  Date of surgery: n/a  Ambulated 50 ft on day of surgery? (N/A if today is not date of surgery): n/a  Number of times ambulated 50 feet or greater today: 2  Patient has been up to chair, edge of bed or HOB 90 degrees for all meals?: yes  Goal met? (goal is ambulating at least 50 feet 2 times on day shift, one time on night shift): yes  If patient did not meet mobility goal, why?: n/a

## 2019-08-15 NOTE — DISCHARGE INSTRUCTIONS
Discharge Instructions    Discharged to home by car with relative. Discharged via wheelchair, hospital escort: Yes.  Special equipment needed: Not Applicable    Be sure to schedule a follow-up appointment with your primary care doctor or any specialists as instructed.     Discharge Plan:   Diet Plan: Discussed  Activity Level: Discussed  Smoking Cessation Offered: Patient Refused  Confirmed Follow up Appointment: Patient to Call and Schedule Appointment  Confirmed Symptoms Management: Discussed  Influenza Vaccine Indication: Indicated: Not available from distributor/    I understand that a diet low in cholesterol, fat, and sodium is recommended for good health. Unless I have been given specific instructions below for another diet, I accept this instruction as my diet prescription.   Other diet: Regular    Special Instructions: None    · Is patient discharged on Warfarin / Coumadin?   No     Depression / Suicide Risk    As you are discharged from this [facility] facility, it is important to learn how to keep safe from harming yourself.    Recognize the warning signs:  · Abrupt changes in personality, positive or negative- including increase in energy   · Giving away possessions  · Change in eating patterns- significant weight changes-  positive or negative  · Change in sleeping patterns- unable to sleep or sleeping all the time   · Unwillingness or inability to communicate  · Depression  · Unusual sadness, discouragement and loneliness  · Talk of wanting to die  · Neglect of personal appearance   · Rebelliousness- reckless behavior  · Withdrawal from people/activities they love  · Confusion- inability to concentrate     If you or a loved one observes any of these behaviors or has concerns about self-harm, here's what you can do:  · Talk about it- your feelings and reasons for harming yourself  · Remove any means that you might use to hurt yourself (examples: pills, rope, extension cords,  firearm)  · Get professional help from the community (Mental Health, Substance Abuse, psychological counseling)  · Do not be alone:Call your Safe Contact- someone whom you trust who will be there for you.  · Call your local CRISIS HOTLINE 792-5644 or 752-456-9023  · Call your local Children's Mobile Crisis Response Team [place] (771) 611-5979 or www.[employer]  · Call the toll free National Suicide Prevention Hotlines   · National Suicide Prevention Lifeline 753-533-GQGF (4398)  · [place] [facility] Network 800-SUICIDE (136-8468)      Discharge Instructions per Nestor Pratt M.D.    Follow up with oncology as outpatient    DIET: regular diet    ACTIVITY: as tolerated    DIAGNOSIS: nausea and vomiting due to chemotherapy    Return to ER if if symptoms worsen

## 2019-08-15 NOTE — CARE PLAN
Problem: Communication  Goal: The ability to communicate needs accurately and effectively will improve  Outcome: PROGRESSING AS EXPECTED  Intervention: [place] patient and significant other/support system to call light to alert staff of needs  Flowsheets (Taken 8/14/2019 1753)  Oriented to:: All of the Following : Location of Bathroom, Visiting Policy, Unit Routine, Call Light and Bedside Controls, Bedside Rail Policy, Smoking Policy, Rights and Responsibilities, Bedside Report, and Patient Education Notebook; Visiting Policy; Smoking Policy; Call Light & Bedside Controls; Patient Education Notebook; Bedside Report; Patient Rights and Responsibilities; Bedside Rail Policy; Unit Routine; Location of bathroom  Note:   Pt educated on policies, call light and given floor welcome book.      Problem: Safety  Goal: Will remain free from falls  Outcome: PROGRESSING AS EXPECTED  Intervention: Assess risk factors for falls  Flowsheets  Taken 8/14/2019 1630  Pt Calls for Assistance: Yes  Taken 8/14/2019 1753  Fall Risk: Risk to Fall -  0 - 1 point  History of fall: 0  Mobility Status Assessment: 0-Ambulates & Transfers Independently. No Assistance Required  Risk for Injury-Any positive answers results in the pt being at high risk for fall related injury: Not Applicable  Note:   Bed in lowest and locked position. Yellow wrist band on. Belongings near patient. Pt uses call light appropriately.      Problem: Discharge Barriers/Planning  Goal: Patient's continuum of care needs will be met  Outcome: PROGRESSING AS EXPECTED     Problem: Safety  Goal: Will remain free from falls  Outcome: PROGRESSING AS EXPECTED  Intervention: Assess risk factors for falls  Flowsheets  Taken 8/14/2019 1630  Pt Calls for Assistance: Yes  Taken 8/14/2019 1753  Fall Risk: Risk to Fall -  0 - 1 point  History of fall: 0  Mobility Status Assessment: 0-Ambulates & Transfers Independently. No Assistance Required  Risk for Injury-Any positive answers  results in the pt being at high risk for fall related injury: Not Applicable  Note:   Bed in lowest and locked position. Yellow wrist band on. Belongings near patient. Pt uses call light appropriately.      Problem: Discharge Barriers/Planning  Goal: Patient's continuum of care needs will be met  Outcome: PROGRESSING AS EXPECTED

## 2019-08-15 NOTE — DIETARY
"Nutrition services: Day 1 of admit.  Tima Rizzo is a 48 y.o. male with admitting DX of chemotherapy-induced nausea and vomiting, gastric cancer    Consult received for MST score 3 (poor appetite, unsure of weight loss)    RD able to visit pt at bedside. Pt relays was unable to eat much 5 days ago as a result of chemo, though appetite has significantly improved since 8/14. States had [employer] brought in by his mother this morning and has been feeling good. Is unsure of weight loss. States when first started chemotherapy this year was 165 lbs, does not elaborate on this. Denies needs at this time, would like fruit as a snack.     Assessment:  Height: 167.6 cm (5' 6\")  Weight: 59.9 kg (132 lb)  Body mass index is 21.31 kg/m²., BMI classification: WNL  Diet/Intake: Regular     Evaluation:   1. Hx of vomiting/nausea, gastric cancer. Noted per H&P, pt is followed by Dr. Keyes and currently on FLOT chemotherapy with his last session this past Saturday (likely 8/10). Noted pt has had nausea following his chemotherapy on the 2 previous cycles however this episode has been more intense.  2. Pt appetite and intake have improved since yesterday. Able to tolerate and keep foods down. Would like fruit as snack. Per ADls, 3x meals charted thus far, all >50%  3. Pt verbally consented to nutrition-focused physical assessment. Pt appears adequately nourished at temporal, orbital, clavicle, and shoulder region. No signs of muscle or fat wasting at this time. Per computer hx, pt weighed 166 lbs (75.3 kg), indicating potential 34 lb weight loss in > 6 months. This is a 20% wt loss, which is severe.   4. Labs: Na 134, Glucose 105  5. Meds: senokot, 0.45% NaCI with KCI continuous infusion  6. Last BM: PTA-pt unsure    Malnutrition Risk: Does not meet criteria at this time for malnutrition per ASPEN guidelines. To reevaluate as indicated, at risk with severe 20% wt loss > 6 months.     Recommendations/Plan:  1. RD to add fruit as " snack per pt request  2. Encourage intake of meals/ snacks  3. Document intake of all meals  as % taken in ADL's to provide interdisciplinary communication across all shifts.   4. Monitor weight.  5. Nutrition rep will continue to see patient for ongoing meal and snack preferences.     RD following

## 2019-08-15 NOTE — PROGRESS NOTES
Received report from ED.  · Patient a & o x 4.   · Patient ambulated 50 ft with standby assistance. Gait steady.  · Pt denies nausea  · Pain 2/10 to abd  · Denies n/t  · Clear liquid diet; pt requesting food. Page sent to MD  · Last BM unknown   · Voiding without difficulty. Patient oriented to room, surroundings and call bell.  · Bed in lowest position, locked and upper side rails up. Patient demonstrated correct use of call bell. Call bell/belongings within reach. Patient educated on hourly rounding.

## 2019-08-15 NOTE — CARE PLAN
Problem: Nutritional:  Goal: Achieve adequate nutritional intake  Description  Patient will consume >50% of meals   Outcome: PROGRESSING AS EXPECTED  Note:   See RD note

## 2019-08-15 NOTE — DISCHARGE SUMMARY
Discharge Summary    CHIEF COMPLAINT ON ADMISSION  Chief Complaint   Patient presents with   • N/V     Family reports hx of stomach cancer. Since Monday, pt has been unable to keep any fluids or solids down.        Reason for Admission  Weakness; N/V     Admission Date  8/14/2019    CODE STATUS  Full Code    HPI & HOSPITAL COURSE  This is a 48 y.o. male with a history of gastric carcinoma T4b N3A MX followed by Dr. Keyes and currently on FLOT chemotherapy with his last session this past Saturday, who was admitted on 8/15/19 after presenting to ER complaining of nausea or vomiting. He was also found to have an elevated wbc which is also related to him taking neulasta after chemo. Pt denies any fever or chills. This morning patient states he feels better, and able to tolerate food, no more nausea or vomiting.  His WBC trended down.  Pt will be discharged home today.     The patient recovered much more quickly than anticipated on admission.    Discharge Date  8/15/19    FOLLOW UP ITEMS POST DISCHARGE  Oncology     DISCHARGE DIAGNOSES  Active Problems:    Gastric cancer (HCC) POA: Yes    HTN (hypertension) POA: Yes    Tobacco abuse POA: Yes    Marijuana use POA: Yes    Bandemia POA: Unknown    Intractable vomiting with nausea POA: Unknown  Resolved Problems:    * No resolved hospital problems. *      FOLLOW UP  Future Appointments   Date Time Provider Department Center   8/24/2019  8:45 AM [facility]   8/25/2019  3:00 PM [facility]   9/7/2019  9:45 AM [facility]   9/8/2019  3:15 PM [facility]     No follow-up provider specified.    MEDICATIONS ON DISCHARGE     Medication List      CONTINUE taking these medications      Instructions   lisinopril 10 MG Tabs  Commonly known as:  PRINIVIL   Take 10 mg by mouth every day.  Dose:  10 mg     Morphine Sulfate ER 15 MG T12a   Take 15 mg by mouth 2 Times a Day.  Dose:  15 mg      omeprazole 40 MG delayed-release capsule  Commonly known as:  PRILOSEC   Take 1 Cap by mouth every day.  Dose:  40 mg     ondansetron 8 MG Tbdp  Commonly known as:  ZOFRAN ODT   Take 8 mg by mouth every 8 hours as needed for Nausea.  Dose:  8 mg     oxycodone 15 MG immediate release tablet  Commonly known as:  OXY-IR   Take 7.5 mg by mouth every 3 hours as needed for Severe Pain.  Dose:  7.5 mg     promethazine 25 MG Tabs  Commonly known as:  PHENERGAN   Take 0.5 Tabs by mouth every 6 hours as needed.  Dose:  12.5 mg        STOP taking these medications    amoxicillin-clavulanate 875-125 MG Tabs  Commonly known as:  AUGMENTIN     COLACE 50 MG Caps  Generic drug:  docusate sodium            Allergies  No Known Allergies    DIET  Orders Placed This Encounter   Procedures   • Diet Order Regular     Standing Status:   Standing     Number of Occurrences:   1     Order Specific Question:   Diet:     Answer:   Regular [1]       ACTIVITY  As tolerated.  Weight bearing as tolerated    CONSULTATIONS  None     PROCEDURES  None     LABORATORY  Lab Results   Component Value Date    SODIUM 136 08/15/2019    POTASSIUM 4.1 08/15/2019    CHLORIDE 104 08/15/2019    CO2 25 08/15/2019    GLUCOSE 110 (H) 08/15/2019    BUN 14 08/15/2019    CREATININE 0.91 08/15/2019        Lab Results   Component Value Date    WBC 46.3 (HH) 08/15/2019    HEMOGLOBIN 8.9 (L) 08/15/2019    HEMATOCRIT 28.1 (L) 08/15/2019    PLATELETCT 266 08/15/2019        Total time of the discharge process exceeds 39 minutes.

## 2019-08-15 NOTE — CARE PLAN
Problem: Safety  Goal: Will remain free from falls  Outcome: PROGRESSING AS EXPECTED  Intervention: Assess risk factors for falls  Note:   Fall risk assessed (see flow sheet). Bed in low position.      Problem: Infection  Goal: Will remain free from infection  Outcome: PROGRESSING AS EXPECTED  Intervention: Implement standard precautions and perform hand washing before and after patient contact  Note:     Hand hygiene and standard precautions implemented

## 2019-08-16 NOTE — PROGRESS NOTES
Pt given discharge instructions, verbal understanding given. Pt left with friend and ambulated out. All belongings taken.

## 2019-08-19 ENCOUNTER — APPOINTMENT (OUTPATIENT)
Dept: ONCOLOGY | Facility: MEDICAL CENTER | Age: 49
End: 2019-08-19
Attending: INTERNAL MEDICINE
Payer: MEDICARE

## 2019-08-31 ENCOUNTER — OUTPATIENT INFUSION SERVICES (OUTPATIENT)
Dept: ONCOLOGY | Facility: MEDICAL CENTER | Age: 49
End: 2019-08-31
Attending: INTERNAL MEDICINE
Payer: MEDICARE

## 2019-08-31 VITALS
RESPIRATION RATE: 18 BRPM | HEART RATE: 94 BPM | DIASTOLIC BLOOD PRESSURE: 86 MMHG | TEMPERATURE: 97.8 F | WEIGHT: 132.94 LBS | OXYGEN SATURATION: 99 % | SYSTOLIC BLOOD PRESSURE: 125 MMHG | BODY MASS INDEX: 21.36 KG/M2 | HEIGHT: 66 IN

## 2019-08-31 DIAGNOSIS — C16.2 MALIGNANT NEOPLASM OF BODY OF STOMACH (HCC): ICD-10-CM

## 2019-08-31 LAB
ALBUMIN SERPL BCP-MCNC: 3.8 G/DL (ref 3.2–4.9)
ALBUMIN/GLOB SERPL: 1.2 G/DL
ALP SERPL-CCNC: 55 U/L (ref 30–99)
ALT SERPL-CCNC: 13 U/L (ref 2–50)
ANION GAP SERPL CALC-SCNC: 8 MMOL/L (ref 0–11.9)
AST SERPL-CCNC: 14 U/L (ref 12–45)
BASOPHILS # BLD AUTO: 0.4 % (ref 0–1.8)
BASOPHILS # BLD: 0.04 K/UL (ref 0–0.12)
BILIRUB SERPL-MCNC: 0.4 MG/DL (ref 0.1–1.5)
BUN SERPL-MCNC: 18 MG/DL (ref 8–22)
CALCIUM SERPL-MCNC: 9.2 MG/DL (ref 8.5–10.5)
CHLORIDE SERPL-SCNC: 101 MMOL/L (ref 96–112)
CO2 SERPL-SCNC: 24 MMOL/L (ref 20–33)
CREAT SERPL-MCNC: 0.88 MG/DL (ref 0.5–1.4)
EOSINOPHIL # BLD AUTO: 0.13 K/UL (ref 0–0.51)
EOSINOPHIL NFR BLD: 1.4 % (ref 0–6.9)
ERYTHROCYTE [DISTWIDTH] IN BLOOD BY AUTOMATED COUNT: 56 FL (ref 35.9–50)
GLOBULIN SER CALC-MCNC: 3.2 G/DL (ref 1.9–3.5)
GLUCOSE SERPL-MCNC: 118 MG/DL (ref 65–99)
HCT VFR BLD AUTO: 34.2 % (ref 42–52)
HGB BLD-MCNC: 11.1 G/DL (ref 14–18)
IMM GRANULOCYTES # BLD AUTO: 0.03 K/UL (ref 0–0.11)
IMM GRANULOCYTES NFR BLD AUTO: 0.3 % (ref 0–0.9)
LYMPHOCYTES # BLD AUTO: 2.41 K/UL (ref 1–4.8)
LYMPHOCYTES NFR BLD: 25.3 % (ref 22–41)
MCH RBC QN AUTO: 29.4 PG (ref 27–33)
MCHC RBC AUTO-ENTMCNC: 32.5 G/DL (ref 33.7–35.3)
MCV RBC AUTO: 90.7 FL (ref 81.4–97.8)
MONOCYTES # BLD AUTO: 0.77 K/UL (ref 0–0.85)
MONOCYTES NFR BLD AUTO: 8.1 % (ref 0–13.4)
NEUTROPHILS # BLD AUTO: 6.14 K/UL (ref 1.82–7.42)
NEUTROPHILS NFR BLD: 64.5 % (ref 44–72)
NRBC # BLD AUTO: 0 K/UL
NRBC BLD-RTO: 0 /100 WBC
PLATELET # BLD AUTO: 387 K/UL (ref 164–446)
PMV BLD AUTO: 9.3 FL (ref 9–12.9)
POTASSIUM SERPL-SCNC: 4 MMOL/L (ref 3.6–5.5)
PROT SERPL-MCNC: 7 G/DL (ref 6–8.2)
RBC # BLD AUTO: 3.77 M/UL (ref 4.7–6.1)
SODIUM SERPL-SCNC: 133 MMOL/L (ref 135–145)
WBC # BLD AUTO: 9.5 K/UL (ref 4.8–10.8)

## 2019-08-31 PROCEDURE — 700111 HCHG RX REV CODE 636 W/ 250 OVERRIDE (IP): Performed by: INTERNAL MEDICINE

## 2019-08-31 PROCEDURE — G0498 CHEMO EXTEND IV INFUS W/PUMP: HCPCS

## 2019-08-31 PROCEDURE — 96367 TX/PROPH/DG ADDL SEQ IV INF: CPT

## 2019-08-31 PROCEDURE — 96413 CHEMO IV INFUSION 1 HR: CPT

## 2019-08-31 PROCEDURE — 96417 CHEMO IV INFUS EACH ADDL SEQ: CPT

## 2019-08-31 PROCEDURE — 700105 HCHG RX REV CODE 258: Performed by: INTERNAL MEDICINE

## 2019-08-31 PROCEDURE — A4212 NON CORING NEEDLE OR STYLET: HCPCS

## 2019-08-31 PROCEDURE — 304540 HCHG NITRO SET VENT 2ND TUB

## 2019-08-31 PROCEDURE — 96415 CHEMO IV INFUSION ADDL HR: CPT

## 2019-08-31 PROCEDURE — 96375 TX/PRO/DX INJ NEW DRUG ADDON: CPT

## 2019-08-31 PROCEDURE — 80053 COMPREHEN METABOLIC PANEL: CPT

## 2019-08-31 PROCEDURE — 700111 HCHG RX REV CODE 636 W/ 250 OVERRIDE (IP): Mod: JG

## 2019-08-31 PROCEDURE — 85025 COMPLETE CBC W/AUTO DIFF WBC: CPT

## 2019-08-31 PROCEDURE — 96368 THER/DIAG CONCURRENT INF: CPT

## 2019-08-31 PROCEDURE — 700105 HCHG RX REV CODE 258

## 2019-08-31 RX ORDER — 0.9 % SODIUM CHLORIDE 0.9 %
VIAL (ML) INJECTION PRN
Status: CANCELLED | OUTPATIENT
Start: 2019-08-31

## 2019-08-31 RX ORDER — ONDANSETRON 8 MG/1
8 TABLET, ORALLY DISINTEGRATING ORAL PRN
Status: CANCELLED | OUTPATIENT
Start: 2019-08-31

## 2019-08-31 RX ORDER — 0.9 % SODIUM CHLORIDE 0.9 %
20 VIAL (ML) INJECTION PRN
Status: CANCELLED | OUTPATIENT
Start: 2019-09-01

## 2019-08-31 RX ORDER — DEXTROSE MONOHYDRATE 50 MG/ML
INJECTION, SOLUTION INTRAVENOUS CONTINUOUS
Status: DISCONTINUED | OUTPATIENT
Start: 2019-08-31 | End: 2019-08-31 | Stop reason: HOSPADM

## 2019-08-31 RX ORDER — PROCHLORPERAZINE MALEATE 10 MG
10 TABLET ORAL EVERY 6 HOURS PRN
Status: CANCELLED | OUTPATIENT
Start: 2019-08-31

## 2019-08-31 RX ORDER — 0.9 % SODIUM CHLORIDE 0.9 %
5 VIAL (ML) INJECTION PRN
Status: CANCELLED | OUTPATIENT
Start: 2019-08-31

## 2019-08-31 RX ORDER — DEXTROSE MONOHYDRATE 50 MG/ML
INJECTION, SOLUTION INTRAVENOUS CONTINUOUS
Status: CANCELLED | OUTPATIENT
Start: 2019-08-31

## 2019-08-31 RX ORDER — ONDANSETRON 2 MG/ML
4 INJECTION INTRAMUSCULAR; INTRAVENOUS PRN
Status: CANCELLED | OUTPATIENT
Start: 2019-08-31

## 2019-08-31 RX ADMIN — SODIUM CHLORIDE 150 MG: 9 INJECTION, SOLUTION INTRAVENOUS at 11:43

## 2019-08-31 RX ADMIN — DEXAMETHASONE SODIUM PHOSPHATE 12 MG: 4 INJECTION, SOLUTION INTRAMUSCULAR; INTRAVENOUS at 11:19

## 2019-08-31 RX ADMIN — DOCETAXEL 84 MG: 20 INJECTION, SOLUTION, CONCENTRATE INTRAVENOUS at 15:18

## 2019-08-31 RX ADMIN — DEXTROSE MONOHYDRATE: 50 INJECTION, SOLUTION INTRAVENOUS at 10:59

## 2019-08-31 RX ADMIN — ONDANSETRON HYDROCHLORIDE 16 MG: 2 SOLUTION INTRAMUSCULAR; INTRAVENOUS at 10:59

## 2019-08-31 RX ADMIN — LEUCOVORIN CALCIUM 336 MG: 350 INJECTION, POWDER, LYOPHILIZED, FOR SUSPENSION INTRAMUSCULAR; INTRAVENOUS at 12:41

## 2019-08-31 RX ADMIN — FLUOROURACIL 4370 MG: 50 INJECTION, SOLUTION INTRAVENOUS at 16:41

## 2019-08-31 RX ADMIN — OXALIPLATIN 142.8 MG: 100 INJECTION, SOLUTION, CONCENTRATE INTRAVENOUS at 12:41

## 2019-08-31 NOTE — PROGRESS NOTES
Chemotherapy Verification - PRIMARY RN      Height = 66.34in  Weight = 60.3kg  BSA = 1.67m2       Medication: Taxotere  Dose: 50mg/m2  Calculated Dose: 83.8mg                             (In mg/m2, AUC, mg/kg)     Medication: Oxaliplatin  Dose: 85mg/m2  Calculated Dose: 141.9mg                             (In mg/m2, AUC, mg/kg)    Medication: Leucovorin  Dose: 200mg/m2  Calculated Dose: 334mg                             (In mg/m2, AUC, mg/kg)    Medication: Fluorouracil  Dose: 2600mg/m2  Calculated Dose: 4342mg over 24 hours                             (In mg/m2, AUC, mg/kg)    I confirm this process was performed independently with the BSA and all final chemotherapy dosing calculations congruent.  Any discrepancies of 10% or greater have been addressed with the chemotherapy pharmacist. The resolution of the discrepancy has been documented in the EPIC progress notes.

## 2019-08-31 NOTE — PROGRESS NOTES
"Pharmacy Chemotherapy Verification    Patient Name: Tima Rizzo   Dx: Malignant neoplasm of the fundus    Protocol: Fluorouracil/Leucovorin/OXALIplatin/DOCEtaxel (FLOT)       *Dosing Reference*  DOCEtaxel 50 mg/m2 IV over 60 minutes on Day 1  OXALIplatin 85 mg/m2 IV over 2 hours on Day 1 concurrent with   Leucovorin 200 mg/m2 IV over 2 hours on Day 1 followed by  Fluorouracil 2600 mg/m2 IV continuous infusion over 24 hours on Day 1  14 day cycle for 4 cycles preoperatively and 4 cycles postoperatively for a total of 8 cycles  NCCN Guidelines for Gastric Cancer.V.1.2019  Yanira DORSEY, et al. Lancet Oncol. 2016;178(12):3656-9886    Allergies:  Patient has no known allergies.     /86   Pulse 94   Temp 36.6 °C (97.8 °F) (Temporal)   Resp 18   Ht 1.685 m (5' 6.34\")   Wt 60.3 kg (132 lb 15 oz)   SpO2 99%   BMI 21.24 kg/m²  Body surface area is 1.68 meters squared.    Labs 8/31/19  ANC~ 6140 Plt = 387k   Hgb = 11.1     SCr = 0.88 mg/dL CrCl ~ 86.6 mL/min   LFT's = WNLs  TBili = 0.4      Drug Order   (Drug name, dose, route, IV Fluid & volume, frequency, number of doses) Cycle 4  Previous treatment: C3 on 8/10/19     Medication = DOCEtaxel (Taxotere)  Base Dose = 50 mg/m2  Calc Dose: Base Dose x 1.68 m2 = 84 mg  Final Dose = 84 mg  Route = IV  Fluid & Volume =  mL  Admin Duration = Over 60 minutes          <10% difference, OK to treat with final dose    Medication = OXALIplatin (Eloxatin)   Base Dose = 85 mg/m2  Calc Dose: Base Dose x 1.68 m2 = 142.8 mg  Final Dose = 142.8 mg  Route = IV  Fluid & Volume = D5W 250 mL  Admin Duration = Over 120 minutes   Run concurrently with leucovorin        <10% difference, OK to treat with final dose    Medication = Leucovorin   Base Dose = 200 mg/m2  Calc Dose:Base Dose x 1.68 m2 = 336 mg  Final Dose = 336 mg  Route = IV  Fluid & Volume = D5W 250 mL  Admin Duration = Over 120 minutes   Run concurrently with oxaliplatin       <10% difference, OK to treat with " final dose    Medication = Fluorouracil (5-FU)  Base Dose = 2600 mg/m2  Calc Dose: Base Dose x 1.68 m2 = 4368 mg  Final Dose = 4370 mg  Route = IV  Conc. = 50 mg/mL  Fluid & Volume = 87.4 mL (+ 3 mL overfill = 90.4 mL)  Admin Duration = Over 24 hours @ 3.6 mL/hr          <10% difference, OK to treat with final dose      By my signature below, I confirm this process was performed independently with the BSA and all final chemotherapy dosing calculations congruent. I have reviewed the above chemotherapy order and that my calculation of the final dose and BSA (when applicable) corroborate those calculations of the  pharmacist. Discrepancies of 10% or greater in the written dose have been addressed and documented within the EPIC Progress notes.    Sukumar Flores, MelaD

## 2019-08-31 NOTE — PROGRESS NOTES
"Pharmacy Chemotherapy Calculation:    Patient name: Tima Rizzo  DX: Gastric cancer    Cycle: 4 (delayed 1 week)  Previous treatment = 8/10/19    Protocol: FLOT  Docetaxel 50 mg/m2 on Day 1  Oxaliplatin 85 mg/m2 on Day 1  Leucovorin 200 mg/m2 on Day 1  Fluorouracil 2600 mg/m2 CIVI over 24 hours on Day 1  Every 14 days for 4 cycles preoperatively and 4 cycles postoperatively   NCCN Guidelines for Gastric Cancer v.1.2019  Al-Stuart SE, et al. Lancet Oncol. 2016 Dec;17(12):4014-3098.     Allergies:  Patient has no known allergies.    /86   Pulse 94   Temp 36.6 °C (97.8 °F) (Temporal)   Resp 18   Ht 1.685 m (5' 6.34\")   Wt 60.3 kg (132 lb 15 oz)   SpO2 99%   BMI 21.24 kg/m²   Body surface area is 1.68 meters squared.     Labs from 8/31/19 reviewed - all within treatment parameters.      Docetaxel 50 mg/m2 x 1.68 m2 = 84 mg   <10% difference, okay to treat with final dose = 84 mg    Oxaliplatin 85 mg/m2 x  1.68 m2 =  142.8 mg   <10% difference, okay to treat with final dose = 142.8 mg    Leucovorin 200 mg/m2 x 1.68 m2 = 336 mg   <10% difference, okay to treat with final dose = 336 mg    Fluorouracil 2600 mg/m2  x 1.68 m2 = 4368 mg   <10% difference, okay to treat with final dose = 4370 mg CIVI over 24 hours    Gail Pedraza, PharmD, BCOP          "

## 2019-08-31 NOTE — PROGRESS NOTES
Chemotherapy Verification - SECONDARY RN       Height = 168.5 cm  Weight = 60.3 kg  BSA = 1.679 m2       Medication: Taxotere  Dose: 50 mg/m2  Calculated Dose: 83.99 mg                             (In mg/m2, AUC, mg/kg)     Medication: Oxaliplatin  Dose: 85 mg/m2  Calculated Dose: 142.7 mg                             (In mg/m2, AUC, mg/kg)    Medication: Home infusion 5FU  Dose: 2600 mg/m2  Calculated Dose: 4365.4 mg over 24 hours                             (In mg/m2, AUC, mg/kg)      I confirm that this process was performed independently.

## 2019-09-01 ENCOUNTER — OUTPATIENT INFUSION SERVICES (OUTPATIENT)
Dept: ONCOLOGY | Facility: MEDICAL CENTER | Age: 49
End: 2019-09-01
Attending: INTERNAL MEDICINE
Payer: MEDICARE

## 2019-09-01 VITALS
OXYGEN SATURATION: 99 % | SYSTOLIC BLOOD PRESSURE: 109 MMHG | DIASTOLIC BLOOD PRESSURE: 69 MMHG | HEART RATE: 100 BPM | TEMPERATURE: 97.7 F | RESPIRATION RATE: 18 BRPM

## 2019-09-01 DIAGNOSIS — C16.2 MALIGNANT NEOPLASM OF BODY OF STOMACH (HCC): ICD-10-CM

## 2019-09-01 PROCEDURE — 700111 HCHG RX REV CODE 636 W/ 250 OVERRIDE (IP)

## 2019-09-01 PROCEDURE — 96523 IRRIG DRUG DELIVERY DEVICE: CPT

## 2019-09-01 PROCEDURE — 96377 APPLICATON ON-BODY INJECTOR: CPT

## 2019-09-01 RX ADMIN — Medication 500 UNITS: at 17:40

## 2019-09-01 NOTE — PROGRESS NOTES
Patient arrived ambulatory to the [facility] for C4D1 of FLOT. Reviewed vital signs, labs, and physician order. Patient denies S&S of infection, or bleeding. Pt reports recent hospitalization r/t inability to eat, and nausea. Reports seeing Dr Keyes since hospitalization.RIght chest port accessed with sterile technique, visualized brisk blood return, labs collected per MD order. Labs within limits to receive treatment. Telephone contact made with Dr Keyse re: need for signed order, and weight loss, per MD pt is to return to [facility] on Tuesday for hydration, and make the office will make contact with the pt re: upcoming PET scan. Message sent to central scheduling for apt on 9/3/19. Pre-treatment medications administered. Chemotherapy administered, no adverse reaction observed. Port flushed per protocol, 5FU pump settings and blood return confirmed with Rossy BENITEZ. Confirmed upcoming appointment date and time with patient, for 5FU pump removal, and Neulasta ONPRO placement. Patient left the [facility] ambulatory in no sign of distress.

## 2019-09-02 ENCOUNTER — OUTPATIENT INFUSION SERVICES (OUTPATIENT)
Dept: ONCOLOGY | Facility: MEDICAL CENTER | Age: 49
End: 2019-09-02
Attending: INTERNAL MEDICINE
Payer: MEDICARE

## 2019-09-02 VITALS
OXYGEN SATURATION: 99 % | WEIGHT: 134.26 LBS | HEART RATE: 84 BPM | DIASTOLIC BLOOD PRESSURE: 66 MMHG | BODY MASS INDEX: 21.07 KG/M2 | HEIGHT: 67 IN | TEMPERATURE: 97.4 F | SYSTOLIC BLOOD PRESSURE: 103 MMHG | RESPIRATION RATE: 18 BRPM

## 2019-09-02 DIAGNOSIS — C16.2 MALIGNANT NEOPLASM OF BODY OF STOMACH (HCC): ICD-10-CM

## 2019-09-02 PROCEDURE — 700111 HCHG RX REV CODE 636 W/ 250 OVERRIDE (IP): Mod: JG | Performed by: INTERNAL MEDICINE

## 2019-09-02 PROCEDURE — 96372 THER/PROPH/DIAG INJ SC/IM: CPT

## 2019-09-02 RX ADMIN — FILGRASTIM 300 MCG: 300 INJECTION, SOLUTION INTRAVENOUS; SUBCUTANEOUS at 17:10

## 2019-09-02 NOTE — PROGRESS NOTES
Pt to [facility] for 24hr 5FU pump disconnection and Neulasta OnPro.  Pt stated his pump alarmed completed about 1710.  Reservoir volume 0ml.  Pump disconnected, port flushed with NS and Heparin per protocol. Brisk blood return noted.  Port de-accessed, gauze to site. Pt refused Neulasta OnPro, stating it made him ill the last time he received it. Dr. Bullock notified, order obtained to d/c Neulasta OnPro, and to give Neupogen Day 3, and possibly days 4 & 5 depending on ANC level.  Pt verbalized an understanding of plan of care.  Follow up appointments scheduled.  Pt left [facility] in NAD.

## 2019-09-03 RX ORDER — SODIUM CHLORIDE 9 MG/ML
500 INJECTION, SOLUTION INTRAVENOUS ONCE
Status: CANCELLED | OUTPATIENT
Start: 2019-09-03

## 2019-09-03 NOTE — PROGRESS NOTES
Pt to [facility] for Neupogen injection. Pt stated he is feeling well.  Received Neupogen as ordered. Left [facility] in NAD, confirmed tomorrow's appointment time.

## 2019-09-04 ENCOUNTER — TELEPHONE (OUTPATIENT)
Dept: ONCOLOGY | Facility: MEDICAL CENTER | Age: 49
End: 2019-09-04

## 2019-09-11 ENCOUNTER — HOSPITAL ENCOUNTER (OUTPATIENT)
Dept: RADIOLOGY | Facility: MEDICAL CENTER | Age: 49
End: 2019-09-11
Attending: INTERNAL MEDICINE
Payer: MEDICARE

## 2019-09-11 DIAGNOSIS — C16.1 MALIGNANT NEOPLASM OF FUNDUS OF STOMACH (HCC): ICD-10-CM

## 2019-09-15 ENCOUNTER — APPOINTMENT (OUTPATIENT)
Dept: ONCOLOGY | Facility: MEDICAL CENTER | Age: 49
End: 2019-09-15
Attending: INTERNAL MEDICINE
Payer: MEDICARE

## 2019-09-16 ENCOUNTER — DOCUMENTATION (OUTPATIENT)
Dept: HEMATOLOGY ONCOLOGY | Facility: MEDICAL CENTER | Age: 49
End: 2019-09-16

## 2019-09-16 NOTE — PROGRESS NOTES
Nutrition Services: Consult  Consult received for nutrition assessment, pt with gastric cancer undergoing chemotherapy. Received call from pt mother requesting appointment with RD. Mother states pt has been having poor appetite and losing weight.    Appointment scheduled for 9/23/19 at 8:30am to see JACQUELIN.

## 2019-09-17 RX ORDER — 0.9 % SODIUM CHLORIDE 0.9 %
20 VIAL (ML) INJECTION PRN
Status: CANCELLED | OUTPATIENT
Start: 2019-09-18

## 2019-09-18 ENCOUNTER — OUTPATIENT INFUSION SERVICES (OUTPATIENT)
Dept: ONCOLOGY | Facility: MEDICAL CENTER | Age: 49
End: 2019-09-18
Attending: INTERNAL MEDICINE
Payer: MEDICARE

## 2019-09-18 ENCOUNTER — HOSPITAL ENCOUNTER (OUTPATIENT)
Dept: RADIOLOGY | Facility: MEDICAL CENTER | Age: 49
End: 2019-09-18
Attending: INTERNAL MEDICINE
Payer: MEDICARE

## 2019-09-18 VITALS
HEART RATE: 72 BPM | OXYGEN SATURATION: 98 % | SYSTOLIC BLOOD PRESSURE: 142 MMHG | WEIGHT: 136.02 LBS | BODY MASS INDEX: 21.86 KG/M2 | TEMPERATURE: 98.7 F | RESPIRATION RATE: 18 BRPM | HEIGHT: 66 IN | DIASTOLIC BLOOD PRESSURE: 103 MMHG

## 2019-09-18 DIAGNOSIS — D70.1 CHEMOTHERAPY-INDUCED NEUTROPENIA (HCC): ICD-10-CM

## 2019-09-18 DIAGNOSIS — C16.2 MALIGNANT NEOPLASM OF BODY OF STOMACH (HCC): ICD-10-CM

## 2019-09-18 DIAGNOSIS — T45.1X5A CHEMOTHERAPY-INDUCED NEUTROPENIA (HCC): ICD-10-CM

## 2019-09-18 PROCEDURE — 96523 IRRIG DRUG DELIVERY DEVICE: CPT

## 2019-09-18 PROCEDURE — 700111 HCHG RX REV CODE 636 W/ 250 OVERRIDE (IP)

## 2019-09-18 PROCEDURE — A4212 NON CORING NEEDLE OR STYLET: HCPCS

## 2019-09-18 PROCEDURE — A9552 F18 FDG: HCPCS

## 2019-09-18 RX ADMIN — HEPARIN 500 UNITS: 100 SYRINGE at 10:03

## 2019-09-18 RX ADMIN — HEPARIN 500 UNITS: 100 SYRINGE at 12:14

## 2019-09-18 NOTE — PROGRESS NOTES
Pt to infusion services ambulatory per self and accompanied by mother.  Pt here for port access prior to PET scan.  Plan of care reviewed.  Pt verbalizes understanding.  Pt reports increased discomfort to abdomen.  Pt to have PET scan today and then plans to f/u with Dr. Keyes regarding PET scan results.  Pt reports increased stress today; emotional support provided.  Pt with right chest port.  Port site cleansed and port accessed with #20G, non-coring aviles Power Loc needle.  Port flushes easily; +brisk blood return verified.  Port flushed with normal saline and heparin per policy.  Port remains accessed for PET scan tomorrow.  Pt aware to return for port flush/de-access if imaging facility unable to do.  Pt left infusion services in no apparent distress after completion of visit, ambulatory.

## 2019-09-18 NOTE — PROGRESS NOTES
Pt returns to infusion services for port de-access post PET scan.  Port flushed with normal saline and heparin per policy; continued + brisk blood return verified.  Port de-accessed; needle intact.  Pressure dressing applied.  Pt left infusion services in no apparent distress after completion of treatment, ambulatory.  Pt tells me he has f/u appointment with Dr. Keyes on 09/25/19.  Next appointment scheduled for 09/28/19.  Pt aware to contact infusion services if appointment not needed or needs to be changed.

## 2019-09-23 ENCOUNTER — DOCUMENTATION (OUTPATIENT)
Dept: HEMATOLOGY ONCOLOGY | Facility: MEDICAL CENTER | Age: 49
End: 2019-09-23

## 2019-09-23 NOTE — PROGRESS NOTES
Nutrition Services: Update    RD attempted to visit with pt as scheduled for appointment with RD on 9/23/19 at 0830. Per Infusion Services, pt checked in briefly and left after RD could not be reached at that time via phone. RD attempted to follow up shortly after, though pt had already left. RD unable to make contact via mobile phone and left voicemsg on home phone with contact information.     RD to follow-up    RD [phone]

## 2019-09-27 RX ORDER — PROCHLORPERAZINE MALEATE 10 MG
10 TABLET ORAL EVERY 6 HOURS PRN
Status: CANCELLED | OUTPATIENT
Start: 2019-10-13

## 2019-09-27 RX ORDER — 0.9 % SODIUM CHLORIDE 0.9 %
VIAL (ML) INJECTION PRN
Status: CANCELLED | OUTPATIENT
Start: 2019-10-12

## 2019-09-27 RX ORDER — 0.9 % SODIUM CHLORIDE 0.9 %
VIAL (ML) INJECTION PRN
Status: CANCELLED | OUTPATIENT
Start: 2019-10-13

## 2019-09-27 RX ORDER — DEXTROSE MONOHYDRATE 50 MG/ML
INJECTION, SOLUTION INTRAVENOUS CONTINUOUS
Status: CANCELLED | OUTPATIENT
Start: 2019-10-13

## 2019-09-27 RX ORDER — 0.9 % SODIUM CHLORIDE 0.9 %
5 VIAL (ML) INJECTION PRN
Status: CANCELLED | OUTPATIENT
Start: 2019-10-12

## 2019-09-27 RX ORDER — ONDANSETRON 8 MG/1
8 TABLET, ORALLY DISINTEGRATING ORAL PRN
Status: CANCELLED | OUTPATIENT
Start: 2019-10-13

## 2019-09-27 RX ORDER — 0.9 % SODIUM CHLORIDE 0.9 %
20 VIAL (ML) INJECTION PRN
Status: CANCELLED | OUTPATIENT
Start: 2019-10-14

## 2019-09-27 RX ORDER — 0.9 % SODIUM CHLORIDE 0.9 %
5 VIAL (ML) INJECTION PRN
Status: CANCELLED | OUTPATIENT
Start: 2019-10-13

## 2019-09-27 RX ORDER — ONDANSETRON 2 MG/ML
4 INJECTION INTRAMUSCULAR; INTRAVENOUS PRN
Status: CANCELLED | OUTPATIENT
Start: 2019-10-13

## 2019-10-07 ENCOUNTER — TELEPHONE (OUTPATIENT)
Dept: HEMATOLOGY ONCOLOGY | Facility: MEDICAL CENTER | Age: 49
End: 2019-10-07

## 2019-10-07 NOTE — TELEPHONE ENCOUNTER
Nutrition Services: Telephone encounter  RD second attempt to reach pt via phone call for nutrition consultation. Called patient to set up appointment but did not answer. RD left voicemsg with contact information.      Please have patient contact RD: [phone]    RD available PRN

## 2019-10-12 ENCOUNTER — OUTPATIENT INFUSION SERVICES (OUTPATIENT)
Dept: ONCOLOGY | Facility: MEDICAL CENTER | Age: 49
End: 2019-10-12
Attending: INTERNAL MEDICINE
Payer: MEDICARE

## 2019-10-12 VITALS
DIASTOLIC BLOOD PRESSURE: 92 MMHG | HEART RATE: 86 BPM | BODY MASS INDEX: 21.35 KG/M2 | HEIGHT: 67 IN | OXYGEN SATURATION: 100 % | TEMPERATURE: 97.1 F | SYSTOLIC BLOOD PRESSURE: 146 MMHG | WEIGHT: 136.02 LBS | RESPIRATION RATE: 18 BRPM

## 2019-10-12 DIAGNOSIS — C16.2 MALIGNANT NEOPLASM OF BODY OF STOMACH (HCC): ICD-10-CM

## 2019-10-12 LAB
ALBUMIN SERPL BCP-MCNC: 3.6 G/DL (ref 3.2–4.9)
ALBUMIN/GLOB SERPL: 1.3 G/DL
ALP SERPL-CCNC: 41 U/L (ref 30–99)
ALT SERPL-CCNC: 9 U/L (ref 2–50)
ANION GAP SERPL CALC-SCNC: 6 MMOL/L (ref 0–11.9)
AST SERPL-CCNC: 16 U/L (ref 12–45)
BASOPHILS # BLD AUTO: 0.5 % (ref 0–1.8)
BASOPHILS # BLD: 0.03 K/UL (ref 0–0.12)
BILIRUB SERPL-MCNC: 0.3 MG/DL (ref 0.1–1.5)
BUN SERPL-MCNC: 11 MG/DL (ref 8–22)
CALCIUM SERPL-MCNC: 8.5 MG/DL (ref 8.5–10.5)
CHLORIDE SERPL-SCNC: 104 MMOL/L (ref 96–112)
CO2 SERPL-SCNC: 27 MMOL/L (ref 20–33)
CREAT SERPL-MCNC: 1.04 MG/DL (ref 0.5–1.4)
EOSINOPHIL # BLD AUTO: 0.3 K/UL (ref 0–0.51)
EOSINOPHIL NFR BLD: 4.9 % (ref 0–6.9)
ERYTHROCYTE [DISTWIDTH] IN BLOOD BY AUTOMATED COUNT: 62.1 FL (ref 35.9–50)
GLOBULIN SER CALC-MCNC: 2.7 G/DL (ref 1.9–3.5)
GLUCOSE SERPL-MCNC: 109 MG/DL (ref 65–99)
HCT VFR BLD AUTO: 32.2 % (ref 42–52)
HGB BLD-MCNC: 10.5 G/DL (ref 14–18)
IMM GRANULOCYTES # BLD AUTO: 0.01 K/UL (ref 0–0.11)
IMM GRANULOCYTES NFR BLD AUTO: 0.2 % (ref 0–0.9)
LYMPHOCYTES # BLD AUTO: 1.86 K/UL (ref 1–4.8)
LYMPHOCYTES NFR BLD: 30.5 % (ref 22–41)
MCH RBC QN AUTO: 29.2 PG (ref 27–33)
MCHC RBC AUTO-ENTMCNC: 32.6 G/DL (ref 33.7–35.3)
MCV RBC AUTO: 89.7 FL (ref 81.4–97.8)
MONOCYTES # BLD AUTO: 0.49 K/UL (ref 0–0.85)
MONOCYTES NFR BLD AUTO: 8 % (ref 0–13.4)
NEUTROPHILS # BLD AUTO: 3.41 K/UL (ref 1.82–7.42)
NEUTROPHILS NFR BLD: 55.9 % (ref 44–72)
NRBC # BLD AUTO: 0 K/UL
NRBC BLD-RTO: 0 /100 WBC
PLATELET # BLD AUTO: 253 K/UL (ref 164–446)
PMV BLD AUTO: 9.5 FL (ref 9–12.9)
POTASSIUM SERPL-SCNC: 3.8 MMOL/L (ref 3.6–5.5)
PROT SERPL-MCNC: 6.3 G/DL (ref 6–8.2)
RBC # BLD AUTO: 3.59 M/UL (ref 4.7–6.1)
SODIUM SERPL-SCNC: 137 MMOL/L (ref 135–145)
WBC # BLD AUTO: 6.1 K/UL (ref 4.8–10.8)

## 2019-10-12 PROCEDURE — 96417 CHEMO IV INFUS EACH ADDL SEQ: CPT

## 2019-10-12 PROCEDURE — 80053 COMPREHEN METABOLIC PANEL: CPT

## 2019-10-12 PROCEDURE — 96367 TX/PROPH/DG ADDL SEQ IV INF: CPT

## 2019-10-12 PROCEDURE — G0498 CHEMO EXTEND IV INFUS W/PUMP: HCPCS

## 2019-10-12 PROCEDURE — A4212 NON CORING NEEDLE OR STYLET: HCPCS

## 2019-10-12 PROCEDURE — 85025 COMPLETE CBC W/AUTO DIFF WBC: CPT

## 2019-10-12 PROCEDURE — 304540 HCHG NITRO SET VENT 2ND TUB

## 2019-10-12 PROCEDURE — 700105 HCHG RX REV CODE 258

## 2019-10-12 PROCEDURE — 96415 CHEMO IV INFUSION ADDL HR: CPT

## 2019-10-12 PROCEDURE — 96368 THER/DIAG CONCURRENT INF: CPT

## 2019-10-12 PROCEDURE — 96375 TX/PRO/DX INJ NEW DRUG ADDON: CPT

## 2019-10-12 PROCEDURE — 700111 HCHG RX REV CODE 636 W/ 250 OVERRIDE (IP): Mod: JG | Performed by: INTERNAL MEDICINE

## 2019-10-12 PROCEDURE — 700105 HCHG RX REV CODE 258: Performed by: INTERNAL MEDICINE

## 2019-10-12 PROCEDURE — 700111 HCHG RX REV CODE 636 W/ 250 OVERRIDE (IP): Mod: JG

## 2019-10-12 PROCEDURE — 96413 CHEMO IV INFUSION 1 HR: CPT

## 2019-10-12 RX ORDER — PROCHLORPERAZINE MALEATE 10 MG
10 TABLET ORAL EVERY 6 HOURS PRN
Refills: 0 | Status: ON HOLD | COMMUNITY
Start: 2019-10-09 | End: 2019-11-09

## 2019-10-12 RX ORDER — LORAZEPAM 0.5 MG/1
1 TABLET ORAL 2 TIMES DAILY PRN
Refills: 0 | COMMUNITY
Start: 2019-08-27 | End: 2019-12-10

## 2019-10-12 RX ADMIN — DOCETAXEL 85 MG: 20 INJECTION, SOLUTION, CONCENTRATE INTRAVENOUS at 11:43

## 2019-10-12 RX ADMIN — LEUCOVORIN CALCIUM 340 MG: 350 INJECTION, POWDER, LYOPHILIZED, FOR SUSPENSION INTRAMUSCULAR; INTRAVENOUS at 13:10

## 2019-10-12 RX ADMIN — FLUOROURACIL 4420 MG: 50 INJECTION, SOLUTION INTRAVENOUS at 15:33

## 2019-10-12 RX ADMIN — SODIUM CHLORIDE 150 MG: 9 INJECTION, SOLUTION INTRAVENOUS at 10:55

## 2019-10-12 RX ADMIN — OXALIPLATIN 144.5 MG: 100 INJECTION, SOLUTION, CONCENTRATE INTRAVENOUS at 13:11

## 2019-10-12 RX ADMIN — ONDANSETRON HYDROCHLORIDE 16 MG: 2 SOLUTION INTRAMUSCULAR; INTRAVENOUS at 10:37

## 2019-10-12 RX ADMIN — DEXAMETHASONE SODIUM PHOSPHATE 12 MG: 4 INJECTION, SOLUTION INTRAMUSCULAR; INTRAVENOUS at 10:26

## 2019-10-12 NOTE — PROGRESS NOTES
Chemotherapy Verification - PRIMARY RN      Height = 169 cm  Weight = 61.7 kg  BSA = 1.7 m^2       Medication: Oxaliplatin  Dose: 85 mg/m^2  Calculated Dose: 144.5 mg                             (In mg/m2, AUC, mg/kg)     Medication: Docetaxel  Dose: 50 mg/m^2  Calculated Dose: 85 mg                             (In mg/m2, AUC, mg/kg)    Medication: Fluorouracil  Dose: 2600 mg/m^2  Calculated Dose: 4420 mg                             (In mg/m2, AUC, mg/kg)        I confirm this process was performed independently with the BSA and all final chemotherapy dosing calculations congruent.  Any discrepancies of 10% or greater have been addressed with the chemotherapy pharmacist. The resolution of the discrepancy has been documented in the EPIC progress notes.

## 2019-10-12 NOTE — PROGRESS NOTES
Chemotherapy Verification - SECONDARY RN       Height = 169 cm  Weight = 61.7 kg  BSA = 1.7 m2       Medication: Taxotere  Dose: 50 mg/m2  Calculated Dose: 85 mg                             (In mg/m2, AUC, mg/kg)     Medication: Oxaliplatin  Dose: 85 mg/m2  Calculated Dose: 144.5 mg                             (In mg/m2, AUC, mg/kg)    Medication: 5FU  Dose: 2600 mg/m2  Calculated Dose: 4420 mg over 24 hrs                            (In mg/m2, AUC, mg/kg)      I confirm that this process was performed independently.

## 2019-10-12 NOTE — PROGRESS NOTES
"Pharmacy Chemotherapy Verification    Patient Name: Tima Rizzo   Dx: Malignant neoplasm of the fundus    Protocol: Fluorouracil/Leucovorin/OXALIplatin/DOCEtaxel (FLOT)       *Dosing Reference*  DOCEtaxel 50 mg/m2 IV over 60 minutes on Day 1  OXALIplatin 85 mg/m2 IV over 2 hours on Day 1 concurrent with   Leucovorin 200 mg/m2 IV over 2 hours on Day 1 followed by  Fluorouracil 2600 mg/m2 IV continuous infusion over 24 hours on Day 1  14 day cycle for 4 cycles preoperatively and 4 cycles postoperatively for a total of 8 cycles  NCCN Guidelines for Gastric Cancer.V.1.2019  Yanira DORSEY, et al. Lancet Oncol. 2016;178(12):4456-0136    Allergies:  Patient has no known allergies.     /92   Pulse 86   Temp 36.2 °C (97.1 °F) (Temporal)   Resp 18   Ht 1.69 m (5' 6.54\") Comment: took 1 inch off for shoes  Wt 61.7 kg (136 lb 0.4 oz)   SpO2 100%   BMI 21.60 kg/m²  Body surface area is 1.7 meters squared.    Labs 10/12/19:  ANC~ 3410 Plt = 253k   Hgb = 10.5     SCr = 1.04mg/dL CrCl ~ 75 mL/min   AST/ALT/AP = WNL TBili = 0.3  K+ = 3.8       Drug Order   (Drug name, dose, route, IV Fluid & volume, frequency, number of doses) Cycle 5 (delayed for personal reasons)   Previous treatment: C4 on 8/31/19     Medication = DOCEtaxel (Taxotere)  Base Dose = 50 mg/m2  Calc Dose: Base Dose x 1.7 m2 = 85 mg  Final Dose = 85 mg  Route = IV  Fluid & Volume =  mL  Admin Duration = Over 60 minutes          <10% difference, OK to treat with final dose    Medication = OXALIplatin (Eloxatin)   Base Dose = 85 mg/m2  Calc Dose: Base Dose x 1.7 m2 = 144.5 mg  Final Dose = 144.5 mg  Route = IV  Fluid & Volume = D5W 250 mL  Admin Duration = Over 120 minutes   Run concurrently with leucovorin        <10% difference, OK to treat with final dose    Medication = Leucovorin   Base Dose = 200 mg/m2  Calc Dose:Base Dose x 1.7 m2 = 340 mg  Final Dose = 340 mg  Route = IV  Fluid & Volume = D5W 250 mL  Admin Duration = Over 120 minutes "   Run concurrently with oxaliplatin       <10% difference, OK to treat with final dose    Medication = Fluorouracil (5-FU)  Base Dose = 2600 mg/m2  Calc Dose: Base Dose x 1.7 m2 = 4420 mg  Final Dose = 4420 mg  Route = IV  Conc. = 50 mg/mL  Fluid & Volume = 88.4 mL (+ 3 mL overfill = 91.4 mL)  Admin Duration = Over 24 hours @ 3.7 mL/hr          <10% difference, OK to treat with final dose      By my signature below, I confirm this process was performed independently with the BSA and all final chemotherapy dosing calculations congruent. I have reviewed the above chemotherapy order and that my calculation of the final dose and BSA (when applicable) corroborate those calculations of the  pharmacist. Discrepancies of 10% or greater in the written dose have been addressed and documented within the EPIC Progress notes.    Krystina Love, PharmD, BCOP

## 2019-10-12 NOTE — PROGRESS NOTES
"Pharmacy Chemotherapy Calculation:    Patient name: Tima Rizzo  DX: Gastric cancer    Cycle: 5 (delayed 4 weeks due to missed appointments & reevaluation)  Previous treatment = 8/31/19    Protocol: FLOT  Docetaxel 50 mg/m2 on Day 1  Oxaliplatin 85 mg/m2 on Day 1  Leucovorin 200 mg/m2 on Day 1  Fluorouracil 2600 mg/m2 CIVI over 24 hours on Day 1  Every 14 days for 4 cycles preoperatively and 4 cycles postoperatively   NCCN Guidelines for Gastric Cancer v.1.2019  Al-Stuart DORSEY, et al. Lancet Oncol. 2016 Dec;17(12):7932-3660.     Allergies:  Patient has no known allergies.    /92   Pulse 86   Temp 36.2 °C (97.1 °F) (Temporal)   Resp 18   Ht 1.69 m (5' 6.54\") Comment: took 1 inch off for shoes  Wt 61.7 kg (136 lb 0.4 oz)   SpO2 100%   BMI 21.60 kg/m²   Body surface area is 1.7 meters squared.     Labs from 10/12/19 reviewed - all within treatment parameters.      Docetaxel 50 mg/m2 x 1.70 m2 = 85 mg   <10% difference, okay to treat with final dose = 85 mg IV    Oxaliplatin 85 mg/m2 x  1.70 m2 =  144 mg   <10% difference, okay to treat with final dose = 144.5 mg IV    Leucovorin 200 mg/m2 x 1.70 m2 = 340 mg   <10% difference, okay to treat with final dose = 340 mg IV     Fluorouracil 2600 mg/m2  x 1.70 m2 = 4420 mg   <10% difference, okay to treat with final dose = 4420 mg CIVI over 24 hours at 3.7 ml/hr    Hari Armendariz, PharmD,           "

## 2019-10-13 ENCOUNTER — OUTPATIENT INFUSION SERVICES (OUTPATIENT)
Dept: ONCOLOGY | Facility: MEDICAL CENTER | Age: 49
End: 2019-10-13
Attending: INTERNAL MEDICINE
Payer: MEDICARE

## 2019-10-13 VITALS
BODY MASS INDEX: 21.31 KG/M2 | RESPIRATION RATE: 18 BRPM | WEIGHT: 135.8 LBS | OXYGEN SATURATION: 98 % | DIASTOLIC BLOOD PRESSURE: 71 MMHG | SYSTOLIC BLOOD PRESSURE: 125 MMHG | HEART RATE: 79 BPM | HEIGHT: 67 IN | TEMPERATURE: 98.1 F

## 2019-10-13 DIAGNOSIS — C16.2 MALIGNANT NEOPLASM OF BODY OF STOMACH (HCC): ICD-10-CM

## 2019-10-13 PROCEDURE — 96523 IRRIG DRUG DELIVERY DEVICE: CPT

## 2019-10-13 PROCEDURE — 700111 HCHG RX REV CODE 636 W/ 250 OVERRIDE (IP)

## 2019-10-13 RX ADMIN — HEPARIN 500 UNITS: 100 SYRINGE at 15:42

## 2019-10-13 NOTE — PROGRESS NOTES
Pt arrived ambulatory to IS for pump disconnect after 24 hour 5FU infusion.  Ordered dose delivered without complication.  Pump disconnected and port flushed, de-accessed, and site covered with gauze and tape.  Pt prefers to return tomorrow for Neulasta injection, instead on having on-pro placed today.  Appointment confirmed.  Pt discharged from IS in NAD under self care.

## 2019-10-13 NOTE — PROGRESS NOTES
Pt presented to infusion for cycle 5 FLOT for gastric cancer. He denied any s/s of infection or open wounds. Pt is having very hard time eating due to tumor pain and nausea and has been losing weight. Discussed feeding tube with pt and though he had been very resistant in past, he is now willing to consider it. He has an appt with Dr. WRIGHT on Wed and will discuss it then. Port accessed in sterile fashion, brisk blood return observed. Labs drawn and reviewed, OK for treatment. Pre-meds given. Taxotere followed by Oxaliplatin/Leucovorin infused without issue. 5 FU pump connected after blood return verified, in RUN mode, placed in pt's sabina pack. Returns tomorrow for disconnect and Neulasta on-Pro. Left on foot in good spirits.

## 2019-10-14 ENCOUNTER — OUTPATIENT INFUSION SERVICES (OUTPATIENT)
Dept: ONCOLOGY | Facility: MEDICAL CENTER | Age: 49
End: 2019-10-14
Attending: INTERNAL MEDICINE
Payer: MEDICARE

## 2019-10-14 VITALS
DIASTOLIC BLOOD PRESSURE: 90 MMHG | SYSTOLIC BLOOD PRESSURE: 150 MMHG | HEART RATE: 71 BPM | WEIGHT: 130.51 LBS | OXYGEN SATURATION: 98 % | RESPIRATION RATE: 18 BRPM | TEMPERATURE: 98.8 F | BODY MASS INDEX: 20.48 KG/M2 | HEIGHT: 67 IN

## 2019-10-14 DIAGNOSIS — C16.0 MALIGNANT NEOPLASM OF CARDIA OF STOMACH (HCC): ICD-10-CM

## 2019-10-14 DIAGNOSIS — C16.2 MALIGNANT NEOPLASM OF BODY OF STOMACH (HCC): ICD-10-CM

## 2019-10-14 PROCEDURE — 96374 THER/PROPH/DIAG INJ IV PUSH: CPT

## 2019-10-14 PROCEDURE — 96361 HYDRATE IV INFUSION ADD-ON: CPT

## 2019-10-14 PROCEDURE — 700111 HCHG RX REV CODE 636 W/ 250 OVERRIDE (IP): Performed by: INTERNAL MEDICINE

## 2019-10-14 PROCEDURE — 96372 THER/PROPH/DIAG INJ SC/IM: CPT | Mod: XU

## 2019-10-14 PROCEDURE — A4212 NON CORING NEEDLE OR STYLET: HCPCS

## 2019-10-14 PROCEDURE — 96375 TX/PRO/DX INJ NEW DRUG ADDON: CPT

## 2019-10-14 PROCEDURE — 700105 HCHG RX REV CODE 258: Performed by: INTERNAL MEDICINE

## 2019-10-14 RX ORDER — SODIUM CHLORIDE 9 MG/ML
500 INJECTION, SOLUTION INTRAVENOUS ONCE
Status: CANCELLED | OUTPATIENT
Start: 2019-10-14

## 2019-10-14 RX ORDER — 0.9 % SODIUM CHLORIDE 0.9 %
VIAL (ML) INJECTION PRN
Status: CANCELLED | OUTPATIENT
Start: 2019-10-14

## 2019-10-14 RX ORDER — SODIUM CHLORIDE 9 MG/ML
1000 INJECTION, SOLUTION INTRAVENOUS ONCE
Status: CANCELLED | OUTPATIENT
Start: 2019-10-14

## 2019-10-14 RX ORDER — 0.9 % SODIUM CHLORIDE 0.9 %
5 VIAL (ML) INJECTION PRN
Status: CANCELLED | OUTPATIENT
Start: 2019-10-14

## 2019-10-14 RX ORDER — DEXAMETHASONE SODIUM PHOSPHATE 4 MG/ML
8 INJECTION, SOLUTION INTRA-ARTICULAR; INTRALESIONAL; INTRAMUSCULAR; INTRAVENOUS; SOFT TISSUE ONCE
Status: COMPLETED | OUTPATIENT
Start: 2019-10-14 | End: 2019-10-14

## 2019-10-14 RX ORDER — LORAZEPAM 2 MG/ML
1 INJECTION INTRAMUSCULAR ONCE
Status: COMPLETED | OUTPATIENT
Start: 2019-10-14 | End: 2019-10-14

## 2019-10-14 RX ORDER — SODIUM CHLORIDE 9 MG/ML
1000 INJECTION, SOLUTION INTRAVENOUS ONCE
Status: COMPLETED | OUTPATIENT
Start: 2019-10-14 | End: 2019-10-14

## 2019-10-14 RX ORDER — SODIUM CHLORIDE 9 MG/ML
500 INJECTION, SOLUTION INTRAVENOUS ONCE
Status: COMPLETED | OUTPATIENT
Start: 2019-10-14 | End: 2019-10-14

## 2019-10-14 RX ADMIN — DEXAMETHASONE SODIUM PHOSPHATE 8 MG: 4 INJECTION, SOLUTION INTRA-ARTICULAR; INTRALESIONAL; INTRAMUSCULAR; INTRAVENOUS; SOFT TISSUE at 16:02

## 2019-10-14 RX ADMIN — SODIUM CHLORIDE 500 ML: 9 INJECTION, SOLUTION INTRAVENOUS at 16:56

## 2019-10-14 RX ADMIN — LORAZEPAM 1 MG: 2 INJECTION INTRAMUSCULAR; INTRAVENOUS at 15:59

## 2019-10-14 RX ADMIN — HEPARIN 500 UNITS: 100 SYRINGE at 17:36

## 2019-10-14 RX ADMIN — SODIUM CHLORIDE 1000 ML: 9 INJECTION, SOLUTION INTRAVENOUS at 15:46

## 2019-10-14 RX ADMIN — PEGFILGRASTIM 6 MG: 6 INJECTION SUBCUTANEOUS at 16:36

## 2019-10-14 NOTE — PROGRESS NOTES
Pt's mom Monica called and reported that the pt has had issues controlling nausea and taking in adequate PO hydration since pump disconnect over the weekend. She reports that pt has not vomited or that pt is having loose BM. She requests that pt receive hydration with Neulasta appt. Dr. Keyes notified of pt's s/s and order received for hydration today, supportive plan placed in beacon.

## 2019-10-15 NOTE — PROGRESS NOTES
Subjective:      Primary care physician:Jenae Steward M.D.  Referring Provider: Lynnette Marie MD  Medical Oncologist: Olimpia Keyes MD     Chief Complaint: No chief complaint on file.    Diagnosis:   1. 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 Abdominal pain, epigastric     3. Loss of weight       I, Dr. Simpson have entered, reviewed and confirmed the above diagnoses related to this patient on this date of service,  .    History of presenting illness:  Tima Rizzo  is a pleasant 49 y.o. year old male who presented with ***      Past Medical History:   Diagnosis Date   • Anesthesia     slow to wake up after EGD   • Anxiety    • Bell palsy 2000; 2010    x2; mild left facial droop   • Cancer (HCC) 2019    Gastric   • Enlarged prostate    • History of positive PPD, untreated    • Hypertension    • Numbness of arm     and hands, bilateral   • Pain     neck and arms   • Prediabetes    • Psychiatric problem     depression    • Snoring    • Urinary bladder disorder     frequent urination (enlarged prostate)     Past Surgical History:   Procedure Laterality Date   • CATH PLACEMENT Right 6/13/2019    Procedure: INSERTION, CATHETER- CEPHALIC POWER PORT  ;  Surgeon: Dickson Simpson M.D.;  Location: [facility];  Service: General   • PB UPPER GI ENDOSCOPY,BIOPSY  6/12/2019    Procedure: GASTROSCOPY, WITH BIOPSY - POSSIBLE;  Surgeon: Bjorn Claudio M.D.;  Location: [facility];  Service: Gastroenterology   • PB UPPER GI ENDOSCOPY,W/DILAT,GASTRIC OUT  6/12/2019    Procedure: GASTROSCOPY, WITH BALLOON DILATION;  Surgeon: Bjorn Claudio M.D.;  Location: [facility];  Service: Gastroenterology   • GASTROSCOPY  6/12/2019    Procedure: GASTROSCOPY;  Surgeon: Bjorn Claudio M.D.;  Location: [facility];  Service: Gastroenterology   • EGD W/ENDOSCOPIC ULTRASOUND  6/12/2019    Procedure: EGD, WITH ENDOSCOPIC US - UPPER, RADIAL;  Surgeon: Bjorn Claudio  M.D.;  Location: SURGERY [facility];  Service: Gastroenterology   • EGD WITH ASP/BX  6/12/2019    Procedure: EGD, WITH ASPIRATION BIOPSY - POSSIBLE;  Surgeon: Bjorn Claudio M.D.;  Location: SURGERY [facility];  Service: Gastroenterology   • OTHER SURGICAL PROCEDURE  2014    cervical discectomy with plates     No Known Allergies  Outpatient Encounter Medications as of 10/16/2019   Medication Sig Dispense Refill   • LORazepam (ATIVAN) 0.5 MG Tab Take 1 Tab by mouth 2 times a day as needed.  0   • prochlorperazine (COMPAZINE) 10 MG Tab Take 10 mg by mouth every 6 hours as needed. AS NEEDED FOR NAUSEA  0   • Morphine Sulfate ER 15 MG Tablet Extended Release 12 hour Abuse-Deterrent Take 30 mg by mouth 2 Times a Day.     • ondansetron (ZOFRAN ODT) 8 MG TABLET DISPERSIBLE Take 8 mg by mouth every 8 hours as needed for Nausea.     • oxycodone (OXY-IR) 15 MG immediate release tablet Take 7.5 mg by mouth every 3 hours as needed for Severe Pain.     • promethazine (PHENERGAN) 25 MG Tab Take 0.5 Tabs by mouth every 6 hours as needed. 30 Tab 0   • omeprazole (PRILOSEC) 40 MG delayed-release capsule Take 1 Cap by mouth every day. 30 Cap 11     No facility-administered encounter medications on file as of 10/16/2019.      Social History     Socioeconomic History   • Marital status: Single     Spouse name: Not on file   • Number of children: Not on file   • Years of education: Not on file   • Highest education level: Not on file   Occupational History   • Not on file   Social Needs   • Financial resource strain: Not on file   • Food insecurity:     Worry: Not on file     Inability: Not on file   • Transportation needs:     Medical: Not on file     Non-medical: Not on file   Tobacco Use   • Smoking status: Current Every Day Smoker     Packs/day: 0.50     Years: 30.00     Pack years: 15.00     Types: Cigarettes   • Smokeless tobacco: Never Used   Substance and Sexual Activity   • Alcohol use: Yes     Comment: 2 per year    • Drug use: Yes     Types: Marijuana     Comment: Cannabis, daily   • Sexual activity: Not on file   Lifestyle   • Physical activity:     Days per week: Not on file     Minutes per session: Not on file   • Stress: Not on file   Relationships   • Social connections:     Talks on phone: Not on file     Gets together: Not on file     Attends Tenri[employer] service: Not on file     Active member of club or organization: Not on file     Attends meetings of clubs or organizations: Not on file     Relationship status: Not on file   • Intimate partner violence:     Fear of current or ex partner: Not on file     Emotionally abused: Not on file     Physically abused: Not on file     Forced sexual activity: Not on file   Other Topics Concern   • Not on file   Social History Narrative   • Not on file      Social History     Tobacco Use   Smoking Status Current Every Day Smoker   • Packs/day: 0.50   • Years: 30.00   • Pack years: 15.00   • Types: Cigarettes   Smokeless Tobacco Never Used     Social History     Substance and Sexual Activity   Alcohol Use Yes    Comment: 2 per year     Social History     Substance and Sexual Activity   Drug Use Yes   • Types: Marijuana    Comment: Cannabis, daily        No family history on file.  No pertinent family history on file    ROS     Objective:   There were no vitals taken for this visit.    Physical Exam    Labs:  Results for JOSELINE SANTIAGO (MRN [id number]) as of 10/15/2019 13:57   Ref. Range 10/12/2019 09:11   WBC Latest Ref Range: 4.8 - 10.8 K/uL 6.1   RBC Latest Ref Range: 4.70 - 6.10 M/uL 3.59 (L)   Hemoglobin Latest Ref Range: 14.0 - 18.0 g/dL 10.5 (L)   Hematocrit Latest Ref Range: 42.0 - 52.0 % 32.2 (L)   MCV Latest Ref Range: 81.4 - 97.8 fL 89.7   MCH Latest Ref Range: 27.0 - 33.0 pg 29.2   MCHC Latest Ref Range: 33.7 - 35.3 g/dL 32.6 (L)   RDW Latest Ref Range: 35.9 - 50.0 fL 62.1 (H)   Platelet Count Latest Ref Range: 164 - 446 K/uL 253   MPV Latest Ref Range: 9.0 - 12.9 fL 9.5    Neutrophils-Polys Latest Ref Range: 44.00 - 72.00 % 55.90   Neutrophils (Absolute) Latest Ref Range: 1.82 - 7.42 K/uL 3.41   Lymphocytes Latest Ref Range: 22.00 - 41.00 % 30.50   Lymphs (Absolute) Latest Ref Range: 1.00 - 4.80 K/uL 1.86   Monocytes Latest Ref Range: 0.00 - 13.40 % 8.00   Monos (Absolute) Latest Ref Range: 0.00 - 0.85 K/uL 0.49   Eosinophils Latest Ref Range: 0.00 - 6.90 % 4.90   Eos (Absolute) Latest Ref Range: 0.00 - 0.51 K/uL 0.30   Basophils Latest Ref Range: 0.00 - 1.80 % 0.50   Baso (Absolute) Latest Ref Range: 0.00 - 0.12 K/uL 0.03   Immature Granulocytes Latest Ref Range: 0.00 - 0.90 % 0.20   Immature Granulocytes (abs) Latest Ref Range: 0.00 - 0.11 K/uL 0.01   Nucleated RBC Latest Units: /100 WBC 0.00   NRBC (Absolute) Latest Units: K/uL 0.00   Sodium Latest Ref Range: 135 - 145 mmol/L 137   Potassium Latest Ref Range: 3.6 - 5.5 mmol/L 3.8   Chloride Latest Ref Range: 96 - 112 mmol/L 104   Co2 Latest Ref Range: 20 - 33 mmol/L 27   Anion Gap Latest Ref Range: 0.0 - 11.9  6.0   Glucose Latest Ref Range: 65 - 99 mg/dL 109 (H)   Bun Latest Ref Range: 8 - 22 mg/dL 11   Creatinine Latest Ref Range: 0.50 - 1.40 mg/dL 1.04   GFR If  Latest Ref Range: >60 mL/min/1.73 m 2 >60   GFR If Non  Latest Ref Range: >60 mL/min/1.73 m 2 >60   Calcium Latest Ref Range: 8.5 - 10.5 mg/dL 8.5   AST(SGOT) Latest Ref Range: 12 - 45 U/L 16   ALT(SGPT) Latest Ref Range: 2 - 50 U/L 9   Alkaline Phosphatase Latest Ref Range: 30 - 99 U/L 41   Total Bilirubin Latest Ref Range: 0.1 - 1.5 mg/dL 0.3   Albumin Latest Ref Range: 3.2 - 4.9 g/dL 3.6   Total Protein Latest Ref Range: 6.0 - 8.2 g/dL 6.3   Globulin Latest Ref Range: 1.9 - 3.5 g/dL 2.7   A-G Ratio Latest Units: g/dL 1.3       Imaging:  Per my read,  9/11/19 PET/CT     Impression         1. Known gastric mass is smaller and with slightly decreased hypermetabolism, suggestive of treatment response.  2. No FDG avid metastatic disease.               8/14/19 CT   Impression       Wall thickening fundus and body the stomach. Margins poorly defined and adjacent fat stranding consistent with malignant neoplasm.  No evidence of bowel obstruction or acute appendicitis. No free fluid. No abscess identified.  No hydronephrosis.  Mild splenomegaly.         Pathology: 5/8/19          Procedures: 5/18/19                         Diagnosis:     1. 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 Abdominal pain, epigastric     3. Loss of weight         I, Dr. Simpson have entered, reviewed and confirmed the above diagnoses related to this patient on this date of service,  .    Medical Decision Making:  Today's Assessment / Status / Plan:     ***

## 2019-10-15 NOTE — PROGRESS NOTES
Patient seen today for Neulasta and hydration. Arrives with emesis bag, very nauseated, and pale. Port accessed using sterile technique with positive blood return. NS started at 999ml/h. Call placed to MD and spoke with Dr. Keyes, patient condition of severe nausea relayed. Orders received. Dexamethasone and Ativan given IV with good relief. Neulast subcutaneous given in back of left arm. Total IV fluids of 1.5L given. Feeling MUCH better at time of discharged.

## 2019-10-16 ENCOUNTER — APPOINTMENT (OUTPATIENT)
Dept: SURGERY | Facility: MEDICAL CENTER | Age: 49
End: 2019-10-16
Payer: MEDICARE

## 2019-10-24 RX ORDER — PROCHLORPERAZINE MALEATE 10 MG
10 TABLET ORAL EVERY 6 HOURS PRN
Status: CANCELLED | OUTPATIENT
Start: 2019-10-27

## 2019-10-24 RX ORDER — 0.9 % SODIUM CHLORIDE 0.9 %
VIAL (ML) INJECTION PRN
Status: CANCELLED | OUTPATIENT
Start: 2019-10-26

## 2019-10-24 RX ORDER — DEXTROSE MONOHYDRATE 50 MG/ML
INJECTION, SOLUTION INTRAVENOUS CONTINUOUS
Status: CANCELLED | OUTPATIENT
Start: 2019-10-27

## 2019-10-24 RX ORDER — 0.9 % SODIUM CHLORIDE 0.9 %
VIAL (ML) INJECTION PRN
Status: CANCELLED | OUTPATIENT
Start: 2019-10-27

## 2019-10-24 RX ORDER — ONDANSETRON 2 MG/ML
4 INJECTION INTRAMUSCULAR; INTRAVENOUS PRN
Status: CANCELLED | OUTPATIENT
Start: 2019-10-27

## 2019-10-24 RX ORDER — 0.9 % SODIUM CHLORIDE 0.9 %
5 VIAL (ML) INJECTION PRN
Status: CANCELLED | OUTPATIENT
Start: 2019-10-26

## 2019-10-24 RX ORDER — ONDANSETRON 8 MG/1
8 TABLET, ORALLY DISINTEGRATING ORAL PRN
Status: CANCELLED | OUTPATIENT
Start: 2019-10-27

## 2019-10-24 RX ORDER — HEPARIN SODIUM (PORCINE) LOCK FLUSH IV SOLN 100 UNIT/ML 100 UNIT/ML
500 SOLUTION INTRAVENOUS PRN
Status: CANCELLED | OUTPATIENT
Start: 2019-10-28

## 2019-10-24 RX ORDER — HEPARIN SODIUM (PORCINE) LOCK FLUSH IV SOLN 100 UNIT/ML 100 UNIT/ML
500 SOLUTION INTRAVENOUS PRN
Status: CANCELLED | OUTPATIENT
Start: 2019-10-27

## 2019-10-24 RX ORDER — 0.9 % SODIUM CHLORIDE 0.9 %
20 VIAL (ML) INJECTION PRN
Status: CANCELLED | OUTPATIENT
Start: 2019-10-28

## 2019-10-24 RX ORDER — HEPARIN SODIUM (PORCINE) LOCK FLUSH IV SOLN 100 UNIT/ML 100 UNIT/ML
500 SOLUTION INTRAVENOUS PRN
Status: CANCELLED | OUTPATIENT
Start: 2019-10-26

## 2019-10-24 RX ORDER — 0.9 % SODIUM CHLORIDE 0.9 %
5 VIAL (ML) INJECTION PRN
Status: CANCELLED | OUTPATIENT
Start: 2019-10-27

## 2019-10-27 ENCOUNTER — APPOINTMENT (OUTPATIENT)
Dept: ONCOLOGY | Facility: MEDICAL CENTER | Age: 49
End: 2019-10-27
Attending: INTERNAL MEDICINE
Payer: MEDICARE

## 2019-10-28 ENCOUNTER — APPOINTMENT (OUTPATIENT)
Dept: ONCOLOGY | Facility: MEDICAL CENTER | Age: 49
End: 2019-10-28
Attending: INTERNAL MEDICINE
Payer: MEDICARE

## 2019-10-29 ENCOUNTER — OFFICE VISIT (OUTPATIENT)
Dept: SURGERY | Facility: MEDICAL CENTER | Age: 49
End: 2019-10-29
Payer: MEDICARE

## 2019-10-29 ENCOUNTER — HOSPITAL ENCOUNTER (INPATIENT)
Facility: MEDICAL CENTER | Age: 49
LOS: 11 days | DRG: 374 | End: 2019-11-09
Attending: INTERNAL MEDICINE | Admitting: INTERNAL MEDICINE
Payer: MEDICARE

## 2019-10-29 ENCOUNTER — HOSPITAL ENCOUNTER (OUTPATIENT)
Facility: MEDICAL CENTER | Age: 49
DRG: 374 | End: 2019-10-29
Admitting: INTERNAL MEDICINE
Payer: MEDICARE

## 2019-10-29 VITALS
BODY MASS INDEX: 19.76 KG/M2 | HEART RATE: 87 BPM | DIASTOLIC BLOOD PRESSURE: 82 MMHG | WEIGHT: 125.88 LBS | TEMPERATURE: 98.1 F | SYSTOLIC BLOOD PRESSURE: 128 MMHG | HEIGHT: 67 IN | OXYGEN SATURATION: 97 %

## 2019-10-29 DIAGNOSIS — R63.4 LOSS OF WEIGHT: ICD-10-CM

## 2019-10-29 DIAGNOSIS — R10.13 ABDOMINAL PAIN, EPIGASTRIC: ICD-10-CM

## 2019-10-29 DIAGNOSIS — R13.10 DYSPHAGIA, UNSPECIFIED TYPE: ICD-10-CM

## 2019-10-29 DIAGNOSIS — C16.8 MALIGNANT NEOPLASM OF OVERLAPPING SITES OF STOMACH (HCC): ICD-10-CM

## 2019-10-29 DIAGNOSIS — E86.0 DEHYDRATION, MODERATE: ICD-10-CM

## 2019-10-29 DIAGNOSIS — E43 SEVERE PROTEIN-CALORIE MALNUTRITION (HCC): ICD-10-CM

## 2019-10-29 DIAGNOSIS — R62.7 FAILURE TO THRIVE IN ADULT: ICD-10-CM

## 2019-10-29 DIAGNOSIS — C16.1 MALIGNANT NEOPLASM OF FUNDUS OF STOMACH (HCC): ICD-10-CM

## 2019-10-29 LAB — EKG IMPRESSION: NORMAL

## 2019-10-29 PROCEDURE — 93005 ELECTROCARDIOGRAM TRACING: CPT | Performed by: INTERNAL MEDICINE

## 2019-10-29 PROCEDURE — 770001 HCHG ROOM/CARE - MED/SURG/GYN PRIV*

## 2019-10-29 PROCEDURE — 700105 HCHG RX REV CODE 258: Performed by: INTERNAL MEDICINE

## 2019-10-29 PROCEDURE — A9270 NON-COVERED ITEM OR SERVICE: HCPCS | Performed by: INTERNAL MEDICINE

## 2019-10-29 PROCEDURE — 700102 HCHG RX REV CODE 250 W/ 637 OVERRIDE(OP): Performed by: INTERNAL MEDICINE

## 2019-10-29 PROCEDURE — A9270 NON-COVERED ITEM OR SERVICE: HCPCS | Performed by: STUDENT IN AN ORGANIZED HEALTH CARE EDUCATION/TRAINING PROGRAM

## 2019-10-29 PROCEDURE — 700111 HCHG RX REV CODE 636 W/ 250 OVERRIDE (IP): Performed by: INTERNAL MEDICINE

## 2019-10-29 PROCEDURE — 99223 1ST HOSP IP/OBS HIGH 75: CPT | Mod: AI | Performed by: INTERNAL MEDICINE

## 2019-10-29 PROCEDURE — 93010 ELECTROCARDIOGRAM REPORT: CPT | Performed by: INTERNAL MEDICINE

## 2019-10-29 PROCEDURE — 700102 HCHG RX REV CODE 250 W/ 637 OVERRIDE(OP): Performed by: STUDENT IN AN ORGANIZED HEALTH CARE EDUCATION/TRAINING PROGRAM

## 2019-10-29 PROCEDURE — 99214 OFFICE O/P EST MOD 30 MIN: CPT | Performed by: SURGERY

## 2019-10-29 RX ORDER — PROCHLORPERAZINE EDISYLATE 5 MG/ML
10 INJECTION INTRAMUSCULAR; INTRAVENOUS EVERY 6 HOURS PRN
Status: DISCONTINUED | OUTPATIENT
Start: 2019-10-29 | End: 2019-11-09 | Stop reason: HOSPADM

## 2019-10-29 RX ORDER — ZOLPIDEM TARTRATE 5 MG/1
2.5 TABLET ORAL ONCE
Status: COMPLETED | OUTPATIENT
Start: 2019-10-29 | End: 2019-10-29

## 2019-10-29 RX ORDER — TAMSULOSIN HYDROCHLORIDE 0.4 MG/1
0.4 CAPSULE ORAL
Status: DISCONTINUED | OUTPATIENT
Start: 2019-10-29 | End: 2019-10-29

## 2019-10-29 RX ORDER — OXYCODONE HYDROCHLORIDE 5 MG/1
7.5 TABLET ORAL
Status: DISCONTINUED | OUTPATIENT
Start: 2019-10-29 | End: 2019-10-30

## 2019-10-29 RX ORDER — SODIUM CHLORIDE 9 MG/ML
INJECTION, SOLUTION INTRAVENOUS CONTINUOUS
Status: DISCONTINUED | OUTPATIENT
Start: 2019-10-29 | End: 2019-11-03

## 2019-10-29 RX ORDER — ONDANSETRON 4 MG/1
8 TABLET, ORALLY DISINTEGRATING ORAL EVERY 8 HOURS PRN
Status: DISCONTINUED | OUTPATIENT
Start: 2019-10-29 | End: 2019-10-30

## 2019-10-29 RX ORDER — MORPHINE SULFATE 15 MG/1
15 TABLET, FILM COATED, EXTENDED RELEASE ORAL 2 TIMES DAILY
Status: DISCONTINUED | OUTPATIENT
Start: 2019-10-29 | End: 2019-10-30

## 2019-10-29 RX ORDER — ACETAMINOPHEN 325 MG/1
650 TABLET ORAL EVERY 6 HOURS PRN
Status: DISCONTINUED | OUTPATIENT
Start: 2019-10-29 | End: 2019-10-30

## 2019-10-29 RX ORDER — TAMSULOSIN HYDROCHLORIDE 0.4 MG/1
0.4 CAPSULE ORAL
Status: DISCONTINUED | OUTPATIENT
Start: 2019-10-29 | End: 2019-10-30

## 2019-10-29 RX ORDER — PROCHLORPERAZINE MALEATE 10 MG
10 TABLET ORAL EVERY 6 HOURS PRN
Status: DISCONTINUED | OUTPATIENT
Start: 2019-10-29 | End: 2019-10-30

## 2019-10-29 RX ORDER — PROMETHAZINE HYDROCHLORIDE 25 MG/1
12.5 TABLET ORAL EVERY 6 HOURS PRN
Status: DISCONTINUED | OUTPATIENT
Start: 2019-10-29 | End: 2019-10-30

## 2019-10-29 RX ORDER — POLYETHYLENE GLYCOL 3350 17 G/17G
1 POWDER, FOR SOLUTION ORAL
Status: DISCONTINUED | OUTPATIENT
Start: 2019-10-29 | End: 2019-10-30

## 2019-10-29 RX ORDER — MORPHINE SULFATE 4 MG/ML
2 INJECTION, SOLUTION INTRAMUSCULAR; INTRAVENOUS EVERY 6 HOURS PRN
Status: DISCONTINUED | OUTPATIENT
Start: 2019-10-29 | End: 2019-11-02

## 2019-10-29 RX ORDER — LORAZEPAM 1 MG/1
0.5 TABLET ORAL 2 TIMES DAILY PRN
Status: DISCONTINUED | OUTPATIENT
Start: 2019-10-29 | End: 2019-10-30

## 2019-10-29 RX ORDER — AMOXICILLIN 250 MG
2 CAPSULE ORAL 2 TIMES DAILY
Status: DISCONTINUED | OUTPATIENT
Start: 2019-10-29 | End: 2019-10-30

## 2019-10-29 RX ORDER — BISACODYL 10 MG
10 SUPPOSITORY, RECTAL RECTAL
Status: DISCONTINUED | OUTPATIENT
Start: 2019-10-29 | End: 2019-10-30

## 2019-10-29 RX ORDER — OMEPRAZOLE 20 MG/1
40 CAPSULE, DELAYED RELEASE ORAL DAILY
Status: DISCONTINUED | OUTPATIENT
Start: 2019-10-30 | End: 2019-10-30

## 2019-10-29 RX ADMIN — OXYCODONE HYDROCHLORIDE 7.5 MG: 5 TABLET ORAL at 20:16

## 2019-10-29 RX ADMIN — ACETAMINOPHEN 650 MG: 325 TABLET, FILM COATED ORAL at 20:16

## 2019-10-29 RX ADMIN — LORAZEPAM 0.5 MG: 1 TABLET ORAL at 23:28

## 2019-10-29 RX ADMIN — TAMSULOSIN HYDROCHLORIDE 0.4 MG: 0.4 CAPSULE ORAL at 18:45

## 2019-10-29 RX ADMIN — PROCHLORPERAZINE MALEATE 10 MG: 10 TABLET ORAL at 18:51

## 2019-10-29 RX ADMIN — ONDANSETRON 8 MG: 4 TABLET, ORALLY DISINTEGRATING ORAL at 20:16

## 2019-10-29 RX ADMIN — ZOLPIDEM TARTRATE 2.5 MG: 5 TABLET ORAL at 21:40

## 2019-10-29 RX ADMIN — SODIUM CHLORIDE: 9 INJECTION, SOLUTION INTRAVENOUS at 18:45

## 2019-10-29 RX ADMIN — MORPHINE SULFATE 15 MG: 15 TABLET, EXTENDED RELEASE ORAL at 18:45

## 2019-10-29 RX ADMIN — SENNOSIDES AND DOCUSATE SODIUM 2 TABLET: 8.6; 5 TABLET ORAL at 18:45

## 2019-10-29 ASSESSMENT — COGNITIVE AND FUNCTIONAL STATUS - GENERAL
SUGGESTED CMS G CODE MODIFIER DAILY ACTIVITY: CH
SUGGESTED CMS G CODE MODIFIER MOBILITY: CH
MOBILITY SCORE: 24
DAILY ACTIVITIY SCORE: 24

## 2019-10-29 ASSESSMENT — ENCOUNTER SYMPTOMS
ABDOMINAL PAIN: 1
SPUTUM PRODUCTION: 0
NECK PAIN: 0
MYALGIAS: 0
VOMITING: 0
CHILLS: 0
EYE PAIN: 0
NAUSEA: 1
DOUBLE VISION: 0
DIARRHEA: 0
PHOTOPHOBIA: 0
DEPRESSION: 1
TREMORS: 0
SPEECH CHANGE: 0
TINGLING: 0
HEADACHES: 0
SENSORY CHANGE: 0
VOMITING: 1
SHORTNESS OF BREATH: 0
PALPITATIONS: 0
HALLUCINATIONS: 0
COUGH: 0
BACK PAIN: 1
WEIGHT LOSS: 1
FOCAL WEAKNESS: 0
DIARRHEA: 1
WEIGHT LOSS: 1
FEVER: 0
DIZZINESS: 0
CONSTIPATION: 0
ABDOMINAL PAIN: 1
BLURRED VISION: 0
ORTHOPNEA: 0
ROS GI COMMENTS: CHANGE IN APPETITE

## 2019-10-29 ASSESSMENT — LIFESTYLE VARIABLES
ALCOHOL_USE: NO
HAVE PEOPLE ANNOYED YOU BY CRITICIZING YOUR DRINKING: NO
EVER FELT BAD OR GUILTY ABOUT YOUR DRINKING: NO
HAVE YOU EVER FELT YOU SHOULD CUT DOWN ON YOUR DRINKING: NO
AVERAGE NUMBER OF DAYS PER WEEK YOU HAVE A DRINK CONTAINING ALCOHOL: 0
DOES PATIENT WANT TO STOP DRINKING: NO
CONSUMPTION TOTAL: NEGATIVE
TOTAL SCORE: 0
HOW MANY TIMES IN THE PAST YEAR HAVE YOU HAD 5 OR MORE DRINKS IN A DAY: 0
EVER HAD A DRINK FIRST THING IN THE MORNING TO STEADY YOUR NERVES TO GET RID OF A HANGOVER: NO
SUBSTANCE_ABUSE: 0
TOTAL SCORE: 0
EVER_SMOKED: YES
TOTAL SCORE: 0
ON A TYPICAL DAY WHEN YOU DRINK ALCOHOL HOW MANY DRINKS DO YOU HAVE: 0

## 2019-10-29 ASSESSMENT — PATIENT HEALTH QUESTIONNAIRE - PHQ9
1. LITTLE INTEREST OR PLEASURE IN DOING THINGS: NOT AT ALL
2. FEELING DOWN, DEPRESSED, IRRITABLE, OR HOPELESS: NOT AT ALL
SUM OF ALL RESPONSES TO PHQ9 QUESTIONS 1 AND 2: 0
1. LITTLE INTEREST OR PLEASURE IN DOING THINGS: NOT AT ALL
2. FEELING DOWN, DEPRESSED, IRRITABLE, OR HOPELESS: NOT AT ALL
SUM OF ALL RESPONSES TO PHQ9 QUESTIONS 1 AND 2: 0

## 2019-10-29 NOTE — PATIENT INSTRUCTIONS
The patient will be directly admitted to medicine in order to rehydrate the patient and improve his performance status.

## 2019-10-29 NOTE — PROGRESS NOTES
Patient is a direct admit from MD office.   Dr Rosas is accepting the patient.   Dr Gamble is consulting for Surgery.   ADT signed and held, needs to be released when the patient arrives on the unit.

## 2019-10-29 NOTE — PROGRESS NOTES
Subjective:   10/29/2019  7:56 AM  Primary care physician:Jenae Steward M.D.  Referring Provider: Lynnette Marie MD  Medical Oncologist: Olimpia Keyes MD     Chief Complaint:   Chief Complaint   Patient presents with   • Follow-Up     [facility]      Diagnosis:   1. 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 Abdominal pain, epigastric     3. Loss of weight     4. Dysphagia, unspecified type     5. Failure to thrive in adult     6. Dehydration, moderate     7. Severe protein-calorie malnutrition (HCC)         History of presenting illness:  Tima Rizzo  is a pleasant 49 y.o. year old male who presented with gastric cancer status post chemotherapy 3 weeks ago.  The patient has had a long chronic difficulty with keeping anything down.  He states over the last several days he has not been able to even keep water down.  He appears to be in significant distress.  He has lost significant amount of weight.  He has severe abdominal pain.  He appears significantly dehydrated has difficulty standing.  He is here with his mother to discuss what to do next.  I have personally reviewed the patient's CT scan from August and my concern is that the tumor may have gotten worse and that is why he is getting worse.  His last chemotherapy was 3 weeks ago.  He does not appear to be stable to go home.      Past Medical History:   Diagnosis Date   • Anesthesia     slow to wake up after EGD   • Anxiety    • Bell palsy 2000; 2010    x2; mild left facial droop   • Cancer (HCC) 2019    Gastric   • Enlarged prostate    • History of positive PPD, untreated    • Hypertension    • Numbness of arm     and hands, bilateral   • Pain     neck and arms   • Prediabetes    • Psychiatric problem     depression    • Snoring    • Urinary bladder disorder     frequent urination (enlarged prostate)     Past Surgical History:   Procedure Laterality Date   • CATH PLACEMENT Right 6/13/2019    Procedure: INSERTION, CATHETER- CEPHALIC POWER PORT   ;  Surgeon: Dickson Simpson M.D.;  Location: [facility];  Service: General   • PB UPPER GI ENDOSCOPY,BIOPSY  6/12/2019    Procedure: GASTROSCOPY, WITH BIOPSY - POSSIBLE;  Surgeon: Bjorn Claudio M.D.;  Location: [facility];  Service: Gastroenterology   • PB UPPER GI ENDOSCOPY,W/DILAT,GASTRIC OUT  6/12/2019    Procedure: GASTROSCOPY, WITH BALLOON DILATION;  Surgeon: Bjorn Claudio M.D.;  Location: [facility];  Service: Gastroenterology   • GASTROSCOPY  6/12/2019    Procedure: GASTROSCOPY;  Surgeon: Bjorn Claudio M.D.;  Location: [facility];  Service: Gastroenterology   • EGD W/ENDOSCOPIC ULTRASOUND  6/12/2019    Procedure: EGD, WITH ENDOSCOPIC US - UPPER, RADIAL;  Surgeon: Bjorn Claudio M.D.;  Location: [facility];  Service: Gastroenterology   • EGD WITH ASP/BX  6/12/2019    Procedure: EGD, WITH ASPIRATION BIOPSY - POSSIBLE;  Surgeon: Bjorn Claudio M.D.;  Location: [facility];  Service: Gastroenterology   • OTHER SURGICAL PROCEDURE  2014    cervical discectomy with plates     No Known Allergies  Outpatient Encounter Medications as of 10/29/2019   Medication Sig Dispense Refill   • LORazepam (ATIVAN) 0.5 MG Tab Take 1 Tab by mouth 2 times a day as needed.  0   • prochlorperazine (COMPAZINE) 10 MG Tab Take 10 mg by mouth every 6 hours as needed. AS NEEDED FOR NAUSEA  0   • Morphine Sulfate ER 15 MG Tablet Extended Release 12 hour Abuse-Deterrent Take 30 mg by mouth 2 Times a Day.     • ondansetron (ZOFRAN ODT) 8 MG TABLET DISPERSIBLE Take 8 mg by mouth every 8 hours as needed for Nausea.     • oxycodone (OXY-IR) 15 MG immediate release tablet Take 7.5 mg by mouth every 3 hours as needed for Severe Pain.     • promethazine (PHENERGAN) 25 MG Tab Take 0.5 Tabs by mouth every 6 hours as needed. 30 Tab 0   • omeprazole (PRILOSEC) 40 MG delayed-release capsule Take 1 Cap by mouth every day. 30 Cap 11     No  facility-administered encounter medications on file as of 10/29/2019.      Social History     Socioeconomic History   • Marital status: Single     Spouse name: Not on file   • Number of children: Not on file   • Years of education: Not on file   • Highest education level: Not on file   Occupational History   • Not on file   Social Needs   • Financial resource strain: Not on file   • Food insecurity:     Worry: Not on file     Inability: Not on file   • Transportation needs:     Medical: Not on file     Non-medical: Not on file   Tobacco Use   • Smoking status: Current Every Day Smoker     Packs/day: 0.50     Years: 30.00     Pack years: 15.00     Types: Cigarettes   • Smokeless tobacco: Never Used   Substance and Sexual Activity   • Alcohol use: Yes     Comment: 2 per year   • Drug use: Yes     Types: Marijuana     Comment: Cannabis, daily   • Sexual activity: Not on file   Lifestyle   • Physical activity:     Days per week: Not on file     Minutes per session: Not on file   • Stress: Not on file   Relationships   • Social connections:     Talks on phone: Not on file     Gets together: Not on file     Attends Restorationism service: Not on file     Active member of club or organization: Not on file     Attends meetings of clubs or organizations: Not on file     Relationship status: Not on file   • Intimate partner violence:     Fear of current or ex partner: Not on file     Emotionally abused: Not on file     Physically abused: Not on file     Forced sexual activity: Not on file   Other Topics Concern   • Not on file   Social History Narrative   • Not on file      Social History     Tobacco Use   Smoking Status Current Every Day Smoker   • Packs/day: 0.50   • Years: 30.00   • Pack years: 15.00   • Types: Cigarettes   Smokeless Tobacco Never Used     Social History     Substance and Sexual Activity   Alcohol Use Yes    Comment: 2 per year     Social History     Substance and Sexual Activity   Drug Use Yes   • Types:  "Marijuana    Comment: Cannabis, daily        History reviewed. No pertinent family history.  No pertinent family history on file    Review of Systems   Constitutional: Positive for malaise/fatigue and weight loss (50lbs).   Gastrointestinal: Positive for abdominal pain, diarrhea and vomiting.        Change in appetite   Genitourinary: Positive for urgency.   Psychiatric/Behavioral: Positive for depression.        Restless sleep  Sleep disturbances    All other systems reviewed and are negative.       Objective:   /82 (BP Location: Right arm, Patient Position: Sitting, BP Cuff Size: Adult)   Pulse 87   Temp 36.7 °C (98.1 °F) (Temporal)   Ht 1.702 m (5' 7\")   Wt 57.1 kg (125 lb 14.1 oz)   SpO2 97%   BMI 19.72 kg/m²     Physical Exam   Constitutional: He is oriented to person, place, and time. He appears distressed.   HENT:   Head: Normocephalic and atraumatic.   Eyes: Pupils are equal, round, and reactive to light. Conjunctivae are normal.   Neck: Normal range of motion. Neck supple.   Cardiovascular: Normal rate and regular rhythm.   Pulmonary/Chest: Effort normal and breath sounds normal.   Abdominal: Soft. There is tenderness.   Musculoskeletal: Normal range of motion.   Neurological: He is alert and oriented to person, place, and time.   Skin: Skin is warm and dry.   Psychiatric:   Severe depression and anxiety   Nursing note and vitals reviewed.      Labs:  Results for JOSELINE SANTIAGO (MRN [id number]) as of 10/29/2019 07:57   Ref. Range 10/12/2019 09:11   WBC Latest Ref Range: 4.8 - 10.8 K/uL 6.1   RBC Latest Ref Range: 4.70 - 6.10 M/uL 3.59 (L)   Hemoglobin Latest Ref Range: 14.0 - 18.0 g/dL 10.5 (L)   Hematocrit Latest Ref Range: 42.0 - 52.0 % 32.2 (L)   MCV Latest Ref Range: 81.4 - 97.8 fL 89.7   MCH Latest Ref Range: 27.0 - 33.0 pg 29.2   MCHC Latest Ref Range: 33.7 - 35.3 g/dL 32.6 (L)   RDW Latest Ref Range: 35.9 - 50.0 fL 62.1 (H)   Platelet Count Latest Ref Range: 164 - 446 K/uL 253   MPV " Latest Ref Range: 9.0 - 12.9 fL 9.5   Neutrophils-Polys Latest Ref Range: 44.00 - 72.00 % 55.90   Neutrophils (Absolute) Latest Ref Range: 1.82 - 7.42 K/uL 3.41   Lymphocytes Latest Ref Range: 22.00 - 41.00 % 30.50   Lymphs (Absolute) Latest Ref Range: 1.00 - 4.80 K/uL 1.86   Monocytes Latest Ref Range: 0.00 - 13.40 % 8.00   Monos (Absolute) Latest Ref Range: 0.00 - 0.85 K/uL 0.49   Eosinophils Latest Ref Range: 0.00 - 6.90 % 4.90   Eos (Absolute) Latest Ref Range: 0.00 - 0.51 K/uL 0.30   Basophils Latest Ref Range: 0.00 - 1.80 % 0.50   Baso (Absolute) Latest Ref Range: 0.00 - 0.12 K/uL 0.03   Immature Granulocytes Latest Ref Range: 0.00 - 0.90 % 0.20   Immature Granulocytes (abs) Latest Ref Range: 0.00 - 0.11 K/uL 0.01   Nucleated RBC Latest Units: /100 WBC 0.00   NRBC (Absolute) Latest Units: K/uL 0.00   Sodium Latest Ref Range: 135 - 145 mmol/L 137   Potassium Latest Ref Range: 3.6 - 5.5 mmol/L 3.8   Chloride Latest Ref Range: 96 - 112 mmol/L 104   Co2 Latest Ref Range: 20 - 33 mmol/L 27   Anion Gap Latest Ref Range: 0.0 - 11.9  6.0   Glucose Latest Ref Range: 65 - 99 mg/dL 109 (H)   Bun Latest Ref Range: 8 - 22 mg/dL 11   Creatinine Latest Ref Range: 0.50 - 1.40 mg/dL 1.04   GFR If  Latest Ref Range: >60 mL/min/1.73 m 2 >60   GFR If Non  Latest Ref Range: >60 mL/min/1.73 m 2 >60   Calcium Latest Ref Range: 8.5 - 10.5 mg/dL 8.5   AST(SGOT) Latest Ref Range: 12 - 45 U/L 16   ALT(SGPT) Latest Ref Range: 2 - 50 U/L 9   Alkaline Phosphatase Latest Ref Range: 30 - 99 U/L 41   Total Bilirubin Latest Ref Range: 0.1 - 1.5 mg/dL 0.3   Albumin Latest Ref Range: 3.2 - 4.9 g/dL 3.6   Total Protein Latest Ref Range: 6.0 - 8.2 g/dL 6.3   Globulin Latest Ref Range: 1.9 - 3.5 g/dL 2.7   A-G Ratio Latest Units: g/dL 1.3       Imaging:  Per my read, PET 9/11/19    Impression         1. Known gastric mass is smaller and with slightly decreased hypermetabolism, suggestive of treatment response.  2. No  FDG avid metastatic disease.       8/14/19 CT   Impression       Wall thickening fundus and body the stomach. Margins poorly defined and adjacent fat stranding consistent with malignant neoplasm.  No evidence of bowel obstruction or acute appendicitis. No free fluid. No abscess identified.  No hydronephrosis.  Mild splenomegaly.         Pathology:  5/8/19          Procedures: 5/18/19                         Diagnosis:     1. 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 Abdominal pain, epigastric     3. Loss of weight     4. Dysphagia, unspecified type     5. Failure to thrive in adult     6. Dehydration, moderate     7. Severe protein-calorie malnutrition (HCC)             Medical Decision Making:  Today's Assessment / Status / Plan:     In light of the present findings, the patient's performance status has decreased dramatically.  He is unable to even keep water down and has been throwing up over the last several days.  He has lost significant weight and is in significant pain.  His nutrition is poor thus the patient cannot function at home alone.  My recommendation is to admit the patient to the hospital service to rehydrate him, have a feeding tube placed by interventional radiology, start him on tube feeds, get another CT scan of the abdomen to stage his tumor because I am concerned that it may have gotten worse.  I am contacting the transfer center and will arrange for direct admit.    I, Dr. Simpson have entered, reviewed and confirmed the above diagnoses related to this patient on this date of service, 10/29/2019  7:56 AM.    He agreed and verbalized his agreement and understanding with the current plan. I answered all questions and concerns he has at this time and advised him to call at any time in the interim with questions or concerns in regards to his care.    Thank you for allowing me to participate in his care, I will continue to follow closely.       Please note that this dictation was  created using voice recognition software. I have made every reasonable attempt to correct obvious errors, but I expect that there are errors of grammar and possibly content that I did not discover before finalizing the note.     Thank you for this consultation and allowing me to participate in your patient's care. If I can be of further service please contact my office.

## 2019-10-29 NOTE — PROGRESS NOTES
Dr. Gamble, requesting direct admission for this patient.  Patient has underlying history of gastric cancer, managed by Dr. Keyes from oncology status post chemotherapy who was referred to Dr. WRIGHT.  Seen by Dr. WRIGHT in the clinic today, patient very weak, unable to stand and severely dehydrated.  Unable to eat anything.  I have discussed the case with Dr. WRIGHT, Dr. WRIGHT believes that this is secondary to progression of the tumor.  Dr. WRIGHT recommends admission to the hospital, hydration.  Once patient is adequately hydrated to proceed with a J-tube, no G-tube or GJ, just a plain J-tube with IR.  If IR is unable to do this, Dr. WRIGHT will do this himself.  Also he recommends once patient is adequately hydrated to proceed with another CT scan of the chest abdomen, pelvis for staging.  Optimization of nutritional status, initiation of tube feeding.  Potential oncology consultation during the hospitalization.  I have accepted this patient is a direct admission to oncology unit, [facility] based on bed availability.    Jennifer Rosas M.D.  10/29/19  11:44 AM

## 2019-10-30 LAB
ALBUMIN SERPL BCP-MCNC: 3.6 G/DL (ref 3.2–4.9)
ALBUMIN/GLOB SERPL: 1.2 G/DL
ALP SERPL-CCNC: 63 U/L (ref 30–99)
ALT SERPL-CCNC: 13 U/L (ref 2–50)
ANION GAP SERPL CALC-SCNC: 7 MMOL/L (ref 0–11.9)
AST SERPL-CCNC: 15 U/L (ref 12–45)
BASOPHILS # BLD AUTO: 0.3 % (ref 0–1.8)
BASOPHILS # BLD: 0.03 K/UL (ref 0–0.12)
BILIRUB SERPL-MCNC: 0.4 MG/DL (ref 0.1–1.5)
BUN SERPL-MCNC: 14 MG/DL (ref 8–22)
CALCIUM SERPL-MCNC: 8.5 MG/DL (ref 8.5–10.5)
CHLORIDE SERPL-SCNC: 104 MMOL/L (ref 96–112)
CO2 SERPL-SCNC: 26 MMOL/L (ref 20–33)
CREAT SERPL-MCNC: 0.75 MG/DL (ref 0.5–1.4)
EOSINOPHIL # BLD AUTO: 0.03 K/UL (ref 0–0.51)
EOSINOPHIL NFR BLD: 0.3 % (ref 0–6.9)
ERYTHROCYTE [DISTWIDTH] IN BLOOD BY AUTOMATED COUNT: 64.8 FL (ref 35.9–50)
GLOBULIN SER CALC-MCNC: 2.9 G/DL (ref 1.9–3.5)
GLUCOSE SERPL-MCNC: 88 MG/DL (ref 65–99)
HCT VFR BLD AUTO: 35.9 % (ref 42–52)
HGB BLD-MCNC: 11.5 G/DL (ref 14–18)
IMM GRANULOCYTES # BLD AUTO: 0.04 K/UL (ref 0–0.11)
IMM GRANULOCYTES NFR BLD AUTO: 0.4 % (ref 0–0.9)
INR PPP: 1.02 (ref 0.87–1.13)
LYMPHOCYTES # BLD AUTO: 2.86 K/UL (ref 1–4.8)
LYMPHOCYTES NFR BLD: 30.4 % (ref 22–41)
MAGNESIUM SERPL-MCNC: 2 MG/DL (ref 1.5–2.5)
MCH RBC QN AUTO: 29.1 PG (ref 27–33)
MCHC RBC AUTO-ENTMCNC: 32 G/DL (ref 33.7–35.3)
MCV RBC AUTO: 90.9 FL (ref 81.4–97.8)
MONOCYTES # BLD AUTO: 0.7 K/UL (ref 0–0.85)
MONOCYTES NFR BLD AUTO: 7.4 % (ref 0–13.4)
NEUTROPHILS # BLD AUTO: 5.74 K/UL (ref 1.82–7.42)
NEUTROPHILS NFR BLD: 61.2 % (ref 44–72)
NRBC # BLD AUTO: 0 K/UL
NRBC BLD-RTO: 0 /100 WBC
PHOSPHATE SERPL-MCNC: 3.3 MG/DL (ref 2.5–4.5)
PLATELET # BLD AUTO: 275 K/UL (ref 164–446)
PMV BLD AUTO: 11.3 FL (ref 9–12.9)
POTASSIUM SERPL-SCNC: 3.9 MMOL/L (ref 3.6–5.5)
PROT SERPL-MCNC: 6.5 G/DL (ref 6–8.2)
PROTHROMBIN TIME: 13.6 SEC (ref 12–14.6)
RBC # BLD AUTO: 3.95 M/UL (ref 4.7–6.1)
SODIUM SERPL-SCNC: 137 MMOL/L (ref 135–145)
WBC # BLD AUTO: 9.4 K/UL (ref 4.8–10.8)

## 2019-10-30 PROCEDURE — 85610 PROTHROMBIN TIME: CPT

## 2019-10-30 PROCEDURE — 84100 ASSAY OF PHOSPHORUS: CPT

## 2019-10-30 PROCEDURE — A9270 NON-COVERED ITEM OR SERVICE: HCPCS | Performed by: INTERNAL MEDICINE

## 2019-10-30 PROCEDURE — 700102 HCHG RX REV CODE 250 W/ 637 OVERRIDE(OP): Performed by: STUDENT IN AN ORGANIZED HEALTH CARE EDUCATION/TRAINING PROGRAM

## 2019-10-30 PROCEDURE — 83735 ASSAY OF MAGNESIUM: CPT

## 2019-10-30 PROCEDURE — 36415 COLL VENOUS BLD VENIPUNCTURE: CPT

## 2019-10-30 PROCEDURE — A9270 NON-COVERED ITEM OR SERVICE: HCPCS | Performed by: STUDENT IN AN ORGANIZED HEALTH CARE EDUCATION/TRAINING PROGRAM

## 2019-10-30 PROCEDURE — 85025 COMPLETE CBC W/AUTO DIFF WBC: CPT

## 2019-10-30 PROCEDURE — 700102 HCHG RX REV CODE 250 W/ 637 OVERRIDE(OP): Performed by: INTERNAL MEDICINE

## 2019-10-30 PROCEDURE — 80053 COMPREHEN METABOLIC PANEL: CPT

## 2019-10-30 PROCEDURE — 770001 HCHG ROOM/CARE - MED/SURG/GYN PRIV*

## 2019-10-30 PROCEDURE — 700105 HCHG RX REV CODE 258: Performed by: INTERNAL MEDICINE

## 2019-10-30 RX ORDER — OXYCODONE HCL 5 MG/5 ML
7.5 SOLUTION, ORAL ORAL
Status: DISCONTINUED | OUTPATIENT
Start: 2019-10-30 | End: 2019-10-30

## 2019-10-30 RX ORDER — POLYETHYLENE GLYCOL 3350 17 G/17G
1 POWDER, FOR SOLUTION ORAL
Status: DISCONTINUED | OUTPATIENT
Start: 2019-10-30 | End: 2019-10-30

## 2019-10-30 RX ORDER — BISACODYL 10 MG
10 SUPPOSITORY, RECTAL RECTAL
Status: DISCONTINUED | OUTPATIENT
Start: 2019-10-30 | End: 2019-10-30

## 2019-10-30 RX ORDER — ACETAMINOPHEN 325 MG/1
650 TABLET ORAL EVERY 6 HOURS PRN
Status: DISCONTINUED | OUTPATIENT
Start: 2019-10-30 | End: 2019-10-30

## 2019-10-30 RX ORDER — TAMSULOSIN HYDROCHLORIDE 0.4 MG/1
0.4 CAPSULE ORAL
Status: DISCONTINUED | OUTPATIENT
Start: 2019-10-31 | End: 2019-11-09 | Stop reason: HOSPADM

## 2019-10-30 RX ORDER — PROCHLORPERAZINE MALEATE 10 MG
10 TABLET ORAL EVERY 6 HOURS PRN
Status: DISCONTINUED | OUTPATIENT
Start: 2019-10-30 | End: 2019-11-09 | Stop reason: HOSPADM

## 2019-10-30 RX ORDER — OXYCODONE HYDROCHLORIDE 5 MG/1
7.5 TABLET ORAL
Status: DISCONTINUED | OUTPATIENT
Start: 2019-10-30 | End: 2019-11-02

## 2019-10-30 RX ORDER — LORAZEPAM 1 MG/1
0.5 TABLET ORAL 2 TIMES DAILY PRN
Status: DISCONTINUED | OUTPATIENT
Start: 2019-10-30 | End: 2019-10-30

## 2019-10-30 RX ORDER — OMEPRAZOLE 20 MG/1
40 CAPSULE, DELAYED RELEASE ORAL DAILY
Status: DISCONTINUED | OUTPATIENT
Start: 2019-10-31 | End: 2019-11-09 | Stop reason: HOSPADM

## 2019-10-30 RX ORDER — PROMETHAZINE HYDROCHLORIDE 25 MG/1
12.5 TABLET ORAL EVERY 6 HOURS PRN
Status: DISCONTINUED | OUTPATIENT
Start: 2019-10-30 | End: 2019-11-09 | Stop reason: HOSPADM

## 2019-10-30 RX ORDER — ACETAMINOPHEN 325 MG/1
650 TABLET ORAL EVERY 6 HOURS PRN
Status: DISCONTINUED | OUTPATIENT
Start: 2019-10-30 | End: 2019-11-09 | Stop reason: HOSPADM

## 2019-10-30 RX ORDER — MORPHINE SULFATE 15 MG/1
15 TABLET, FILM COATED, EXTENDED RELEASE ORAL 2 TIMES DAILY
Status: DISCONTINUED | OUTPATIENT
Start: 2019-10-30 | End: 2019-11-03

## 2019-10-30 RX ORDER — ONDANSETRON 4 MG/1
8 TABLET, ORALLY DISINTEGRATING ORAL EVERY 8 HOURS PRN
Status: DISCONTINUED | OUTPATIENT
Start: 2019-10-30 | End: 2019-11-09 | Stop reason: HOSPADM

## 2019-10-30 RX ORDER — AMOXICILLIN 250 MG
2 CAPSULE ORAL 2 TIMES DAILY
Status: DISCONTINUED | OUTPATIENT
Start: 2019-10-30 | End: 2019-11-04

## 2019-10-30 RX ORDER — ONDANSETRON 4 MG/1
8 TABLET, ORALLY DISINTEGRATING ORAL EVERY 8 HOURS PRN
Status: DISCONTINUED | OUTPATIENT
Start: 2019-10-30 | End: 2019-10-30

## 2019-10-30 RX ORDER — PROCHLORPERAZINE MALEATE 10 MG
10 TABLET ORAL EVERY 6 HOURS PRN
Status: DISCONTINUED | OUTPATIENT
Start: 2019-10-30 | End: 2019-10-30

## 2019-10-30 RX ORDER — AMOXICILLIN 250 MG
2 CAPSULE ORAL 2 TIMES DAILY
Status: DISCONTINUED | OUTPATIENT
Start: 2019-10-30 | End: 2019-10-30

## 2019-10-30 RX ORDER — BISACODYL 10 MG
10 SUPPOSITORY, RECTAL RECTAL
Status: DISCONTINUED | OUTPATIENT
Start: 2019-10-30 | End: 2019-11-04

## 2019-10-30 RX ORDER — ZOLPIDEM TARTRATE 5 MG/1
5 TABLET ORAL ONCE
Status: COMPLETED | OUTPATIENT
Start: 2019-10-30 | End: 2019-10-30

## 2019-10-30 RX ORDER — PROMETHAZINE HYDROCHLORIDE 25 MG/1
12.5 TABLET ORAL EVERY 6 HOURS PRN
Status: DISCONTINUED | OUTPATIENT
Start: 2019-10-30 | End: 2019-10-30

## 2019-10-30 RX ORDER — POLYETHYLENE GLYCOL 3350 17 G/17G
1 POWDER, FOR SOLUTION ORAL
Status: DISCONTINUED | OUTPATIENT
Start: 2019-10-30 | End: 2019-11-04

## 2019-10-30 RX ORDER — LORAZEPAM 1 MG/1
0.5 TABLET ORAL 2 TIMES DAILY PRN
Status: DISCONTINUED | OUTPATIENT
Start: 2019-10-30 | End: 2019-10-31

## 2019-10-30 RX ADMIN — ZOLPIDEM TARTRATE 5 MG: 5 TABLET ORAL at 21:06

## 2019-10-30 RX ADMIN — PROCHLORPERAZINE MALEATE 10 MG: 10 TABLET ORAL at 07:22

## 2019-10-30 RX ADMIN — SENNOSIDES AND DOCUSATE SODIUM 2 TABLET: 8.6; 5 TABLET ORAL at 04:52

## 2019-10-30 RX ADMIN — SODIUM CHLORIDE: 9 INJECTION, SOLUTION INTRAVENOUS at 04:52

## 2019-10-30 RX ADMIN — OXYCODONE HYDROCHLORIDE 7.5 MG: 5 TABLET ORAL at 07:18

## 2019-10-30 RX ADMIN — PROCHLORPERAZINE MALEATE 10 MG: 10 TABLET ORAL at 12:08

## 2019-10-30 RX ADMIN — OXYCODONE HYDROCHLORIDE 7.5 MG: 5 TABLET ORAL at 12:08

## 2019-10-30 RX ADMIN — OXYCODONE HYDROCHLORIDE 5 MG: 5 TABLET ORAL at 19:22

## 2019-10-30 RX ADMIN — ACETAMINOPHEN 650 MG: 325 TABLET, FILM COATED ORAL at 19:22

## 2019-10-30 RX ADMIN — PROMETHAZINE HYDROCHLORIDE 12.5 MG: 25 TABLET ORAL at 17:22

## 2019-10-30 RX ADMIN — SODIUM CHLORIDE: 9 INJECTION, SOLUTION INTRAVENOUS at 17:23

## 2019-10-30 RX ADMIN — MORPHINE SULFATE 15 MG: 15 TABLET, EXTENDED RELEASE ORAL at 04:52

## 2019-10-30 RX ADMIN — PROCHLORPERAZINE MALEATE 10 MG: 10 TABLET ORAL at 19:22

## 2019-10-30 RX ADMIN — OMEPRAZOLE 40 MG: 20 CAPSULE, DELAYED RELEASE ORAL at 04:52

## 2019-10-30 RX ADMIN — MORPHINE SULFATE 15 MG: 15 TABLET, EXTENDED RELEASE ORAL at 17:23

## 2019-10-30 RX ADMIN — SENNOSIDES AND DOCUSATE SODIUM 2 TABLET: 8.6; 5 TABLET ORAL at 17:23

## 2019-10-30 RX ADMIN — TAMSULOSIN HYDROCHLORIDE 0.4 MG: 0.4 CAPSULE ORAL at 10:05

## 2019-10-30 NOTE — ASSESSMENT & PLAN NOTE
He reported that he is using only 1 to 2 cigarettes/day and he expressed that he would be okay without nicotine replacement.

## 2019-10-30 NOTE — PROGRESS NOTES
Assessment complete.  A&O x 4. Patient calls appropriately.  Patient up self and steady.   Patient has minimal abdominal pain.  Denies N&V. Tolerating diet.  Scattered abrasions throughout body, small scabs.  + void  Patient denies SOB.  Dr. Espinoza at bedside.   Review plan with of care with patient. Call light and personal belongings with in reach. Hourly rounding in place. All needs met at this time.

## 2019-10-30 NOTE — ASSESSMENT & PLAN NOTE
He found to have dehydration on clinical exam.  He has not been eating and drinking due to abdominal pain and nausea.  I started him on IV fluids  I ordered lab work-up which include CBC, CMP and magnesium.

## 2019-10-30 NOTE — PROGRESS NOTES
Discussed with Dr. Robison will transfer care to [facility] medicine.      H&P reviewed  Patient evaluated at bedside requesting medication for sleep  Ambien 2.5 mg ordered, may give additional 2.5 mg if does not get desired effect

## 2019-10-30 NOTE — ASSESSMENT & PLAN NOTE
He has been having ongoing abdominal pain.  I continue his outpatient pain medication per  I ordered IV morphine for breakthrough point with holding parameters.  Monitor for adverse effect of pain medication.  He needs CT scan chest thorax and abdominal.  I am holding CT scan order to evaluate for his renal function prior to order CT scan.  I discussed about pain management with him.

## 2019-10-30 NOTE — DISCHARGE PLANNING
Care Transition Team Assessment      Anticipated Discharge Disposition: Home with Enteral tube feeds.    Action: RN MILDRED assessed pt at bedside and verified facesheet demographics.  Pt reports he lives in a single story apartment with his mother in [place].  He is independent with ADLs and IADLs and reports using no DME. Pt is disabled, has prescription drug coverage, [facility] [address] and reports no financial barriers at this time. Pt's PCP is Jenae Steward MD. Pt anticipates discharging home with no needs when medically cleared.     Barriers to Discharge: Medical clearance pending; jejunostomy tube placement & home tube feed set up orders.     Plan: Await medical clearance and orders for home tube feeds. MD and RN kindly notify RN MILDRED when orders have been entered. RN CM will follow and provide assist with discharge planning needs.     Information Source  Orientation : Oriented x 4  Information Given By: Patient  Who is responsible for making decisions for patient? : Patient    Readmission Evaluation  Is this a readmission?: No    Elopement Risk  Legal Hold: No  Ambulatory or Self Mobile in Wheelchair: Yes  Disoriented: No  Psychiatric Symptoms: None  History of Wandering: No  Elopement this Admit: No  Vocalizing Wanting to Leave: No  Displays Behaviors, Body Language Wanting to Leave: No-Not at Risk for Elopement  Elopement Risk: Not at Risk for Elopement    Interdisciplinary Discharge Planning  Primary Care Physician: Jenae Steward MD  Lives with - Patient's Self Care Capacity: Parents  Patient or legal guardian wants to designate a caregiver (see row info): No  Support Systems: Parent  Housing / Facility: 1 Story Apartment / Condo  Able to Return to Previous ADL's: Yes  Mobility Issues: No  Prior Services: Home-Independent  Patient Expects to be Discharged to:: Home  Assistance Needed: Unknown at this Time  Durable Medical Equipment: Not Applicable    Discharge Preparedness  What is your plan after  discharge?: Home with help  What are your discharge supports?: Parent  Prior Functional Level: Ambulatory, Independent with Activities of Daily Living, Independent with Medication Management  Difficulity with ADLs: None  Difficulity with IADLs: None    Functional Assesment  Prior Functional Level: Ambulatory, Independent with Activities of Daily Living, Independent with Medication Management    Finances  Financial Barriers to Discharge: No  Prescription Coverage: Yes    Vision / Hearing Impairment  Vision Impairment : No  Hearing Impairment : No         Advance Directive  Advance Directive?: None    Domestic Abuse  Have you ever been the victim of abuse or violence?: No  Physical Abuse or Sexual Abuse: No  Verbal Abuse or Emotional Abuse: No  Possible Abuse Reported to:: Not Applicable         Discharge Risks or Barriers  Discharge risks or barriers?: Complex medical needs  Patient risk factors: Uninsured or underinsured    Anticipated Discharge Information  Anticipated discharge disposition: Home, Infusion / Enteral - home, [facility]  Discharge Address: [facility] [address] , [place]  Discharge Contact Phone Number: [phone]

## 2019-10-30 NOTE — CARE PLAN
Problem: Communication  Goal: The ability to communicate needs accurately and effectively will improve  Outcome: PROGRESSING AS EXPECTED     Problem: Safety  Goal: Will remain free from injury  Outcome: PROGRESSING AS EXPECTED  Note:   Pt will use call light   Goal: Will remain free from falls  Outcome: PROGRESSING AS EXPECTED  Note:   Pt verbalized understanding.     Problem: Knowledge Deficit  Goal: Knowledge of disease process/condition, treatment plan, diagnostic tests, and medications will improve  Outcome: PROGRESSING AS EXPECTED

## 2019-10-30 NOTE — PROGRESS NOTES
"  [facility] FAMILY MEDICINE PROGRESS NOTE     Attending: Ruby Mcneill M.D.  Senior Resident: Jonas Vallejo M.D. (PGY-3)  Jose Luis Resident: Niko Whittington M.D. (PGY-1)  PATIENT: Tima Rizzo; [id number]; 1970    ID: 49 y.o. male admitted for J-tube placement.    Overnight Events: No acute events overnight events.    Subjective: Patient sitting in bed. Reports his pain is under control at this time. Does feel an \"ache\" that he attributes to being hungry. Patient reports he is ready for the procedure. Concerned with weight loss. He denies nausea at those tome. No chest pain shortness of breath, dysuria, or constipation.    OBJECTIVE:  Temp:  [36.7 °C (98 °F)-37.4 °C (99.3 °F)] 36.8 °C (98.3 °F)  Pulse:  [67-91] 87  Resp:  [16-18] 18  BP: (109-148)/() 128/87  SpO2:  [96 %-99 %] 97 %    Intake/Output Summary (Last 24 hours) at 10/30/2019 0548  Last data filed at 10/30/2019 0400  Gross per 24 hour   Intake 1160 ml   Output --   Net 1160 ml       PE:  Gen: No Acute Distress   HEENT: NCAT   Pulm: CTABL, no respiratory distress   Cardio: NS1S2 NMRG   Abdom: Scaffold, BS+,  Mild tenderness to palpation at the epigastrum, no rebound or guarding  Ext: No edema, 2+ pulses     LABS:  Recent Labs     10/30/19  0509   WBC 9.4   RBC 3.95*   HEMOGLOBIN 11.5*   HEMATOCRIT 35.9*   MCV 90.9   MCH 29.1   RDW 64.8*   PLATELETCT 275   MPV 11.3   NEUTSPOLYS 61.20   LYMPHOCYTES 30.40   MONOCYTES 7.40   EOSINOPHILS 0.30   BASOPHILS 0.30     Recent Labs     10/30/19  0509   SODIUM 137   POTASSIUM 3.9   CHLORIDE 104   CO2 26   BUN 14   CREATININE 0.75   CALCIUM 8.5   MAGNESIUM 2.0   PHOSPHORUS 3.3   ALBUMIN 3.6     Estimated GFR/CRCL = Estimated Creatinine Clearance: 92.9 mL/min (by C-G formula based on SCr of 0.75 mg/dL).  Recent Labs     10/30/19  0509   GLUCOSE 88     Recent Labs     10/30/19  0509   ASTSGOT 15   ALTSGPT 13   TBILIRUBIN 0.4   ALKPHOSPHAT 63   GLOBULIN 2.9   INR 1.02             Recent Labs     10/30/19  0509   INR 1.02 "       MICROBIOLOGY:   No results found for: BLOODCULTU, BLDCULT, BCHOLD     IMAGING:   No orders to display       MEDS:  Current Facility-Administered Medications   Medication Last Dose   • senna-docusate (PERICOLACE or SENOKOT S) 8.6-50 MG per tablet 2 Tab 2 Tab at 10/30/19 0452    And   • polyethylene glycol/lytes (MIRALAX) PACKET 1 Packet      And   • magnesium hydroxide (MILK OF MAGNESIA) suspension 30 mL      And   • bisacodyl (DULCOLAX) suppository 10 mg     • NS infusion     • acetaminophen (TYLENOL) tablet 650 mg 650 mg at 10/29/19 2016   • tamsulosin (FLOMAX) capsule 0.4 mg 0.4 mg at 10/30/19 1005   • LORazepam (ATIVAN) tablet 0.5 mg 0.5 mg at 10/29/19 2328   • morphine ER (MS CONTIN) tablet 15 mg 15 mg at 10/30/19 0452   • omeprazole (PRILOSEC) capsule 40 mg 40 mg at 10/30/19 0452   • ondansetron (ZOFRAN ODT) dispertab 8 mg 8 mg at 10/29/19 2016   • oxyCODONE immediate-release (ROXICODONE) tablet 7.5 mg 7.5 mg at 10/30/19 1208   • prochlorperazine (COMPAZINE) tablet 10 mg 10 mg at 10/30/19 1208   • promethazine (PHENERGAN) tablet 12.5 mg     • morphine (pf) 4 MG/ML injection 2 mg     • prochlorperazine (COMPAZINE) injection 10 mg           ASSESSMENT/PLAN: 49 y.o. male who was a direct admit for J-tube placement 2/2 to nausea, emesis, and pain all exacerbated by PO intake 2/2 hepatocellular carcinoma    # Nausea/emesis  # Gastric cancer 2/2 to hepatocellular carcinoma metastasis s/p chemo therapy 3 weeks prior to admission.  - Pt to undergo J-tube placement with IR today  - NPO for procedure.  - Will rehydrate and repeat CT per oncology prior to discharge.   - Oncology consult placed.   - Continue IV maintainance fluids.  - Nutrition consultation placed for feeding recommendations  - Continue:  Morphine IR 15 mg PO BiD  Morphine 2 mg IV Q 6hr PRN  Oxy 7.5 mg PO q3hr PRN    # Enlarged prostate  - Continue:  Flomax 0.4 mg PO daily    # Microcytic anemia, unspecified.  - Iron studies ordered.     #Core  Measures   VTE PPx:Hold for procedure today  Abx:N/A  Lines/Tubes:perfieral IV  Fluids:NS @ 100 mL/hr  Diet: NPO  Code Status: Full code      Jonas Vallejo M.D.   PGY-3  [facility]   [phone]

## 2019-10-30 NOTE — CARE PLAN
Problem: Pain Management  Goal: Pain level will decrease to patient's comfort goal  Outcome: PROGRESSING AS EXPECTED  Note:   Patient experiencing pain relief with MAR medication. Patient encouraged to call is pain worsens. Hourly rounding in place.    Problem: Safety  Goal: Will remain free from injury  Outcome: PROGRESSING AS EXPECTED  Note:   Patient free from accidental injury at this time safety precautions in place. Hourly rounding in place.

## 2019-10-30 NOTE — PROGRESS NOTES
Bedside report received.  Assessment complete.  A&O x 4. Patient calls appropriately.  Patient very anxious and tearful.  Patient up self and steady.   Patient has 3/10 pain. Declines pain medication at this time.  Denies N&V. NPO.  Small scabs throughout body.  + void, + flatus, 10/29 BM.  Patient denies SOB.  Review plan with of care with patient. Call light and personal belongings with in reach. Hourly rounding in place. All needs met at this time.

## 2019-10-30 NOTE — ASSESSMENT & PLAN NOTE
You found to have prolonged QTC on prior EKG.  I resume his outpatient nausea medication.  I ordered IV Compazine.  I ordered EKG to evaluate for QTC.

## 2019-10-30 NOTE — H&P
Hospital Medicine History & Physical Note    Date of Service  10/29/2019    Primary Care Physician  Jenae Steward M.D.    Consultants  Interventional radiology    Code Status  Full code (I discussed this on admission)    Chief Complaint  Direct admission due to dehydration    History of Presenting Illness    49 y.o. male with past medical history of gastric cancer who presented to the hospital 10/29/2019 as a direct admission from Dr. Simpson due to dehydration and placement of J-tube.  Patient reported that he has been having abdominal pain which is located in upper abdomen and he rated his pain 2-3/10 in intensity.  It aggravated by food and alleviated with pain medication.  No specific radiation of this pain.  His pain is associated with nausea and vomiting.  He has been taking several nausea medication in order to avoid nausea and vomiting.  He has been taking pain medication for his epigastric pain.  He also reported that he has now been eating and drinking properly due to ongoing nausea and abdominal pain.  He has been following oncology and Dr. Keyes is his oncologist.  He denies any symptoms of chest pain and acute shortness of breath.  He reported that he has a history of BPH and he did not take his medication due to financial reason and after discussion he agreed to take medication for BPH.  He denies any other complaints.    I reviewed Dr. Simpson note from today.    I also reviewed his last discharge summary which was on August 15, 2019    Review of Systems  Review of Systems   Constitutional: Positive for malaise/fatigue and weight loss. Negative for chills and fever.   HENT: Negative for hearing loss and tinnitus.    Eyes: Negative for blurred vision, double vision, photophobia and pain.   Respiratory: Negative for cough, sputum production and shortness of breath.    Cardiovascular: Negative for chest pain, palpitations, orthopnea and leg swelling.   Gastrointestinal: Positive for abdominal  pain and nausea. Negative for constipation, diarrhea and vomiting.   Genitourinary: Negative for dysuria, frequency and urgency.   Musculoskeletal: Positive for back pain. Negative for joint pain, myalgias and neck pain.   Skin: Negative for rash.   Neurological: Negative for dizziness, tingling, tremors, sensory change, speech change, focal weakness and headaches.   Psychiatric/Behavioral: Negative for hallucinations and substance abuse.   All other systems reviewed and are negative.      Past Medical History   has a past medical history of Anesthesia, Anxiety, Bell palsy (2000; 2010), Cancer (HCC) (2019), Enlarged prostate, History of positive PPD, untreated, Hypertension, Numbness of arm, Pain, Prediabetes, Psychiatric problem, Snoring, and Urinary bladder disorder.    Surgical History   has a past surgical history that includes other surgical procedure (2014); pr upper gi endoscopy,biopsy (6/12/2019); pr upper gi endoscopy,w/dilat,gastric out (6/12/2019); gastroscopy (6/12/2019); egd w/endoscopic ultrasound (6/12/2019); egd with asp/bx (6/12/2019); and cath placement (Right, 6/13/2019).     Family History  I reviewed with patient    Father side of the family has history of chronic ongoing problem.  Mother side of the family has heart problem and hypertension.    Social History   reports that he has been smoking cigarettes. He has a 15.00 pack-year smoking history. He has never used smokeless tobacco. He reports that he drinks alcohol. He reports that he has current or past drug history. Drug: Marijuana.    Allergies  No Known Allergies    Medications  Prior to Admission Medications   Prescriptions Last Dose Informant Patient Reported? Taking?   LORazepam (ATIVAN) 0.5 MG Tab 10/29/2019 at 1000  Yes No   Sig: Take 1 Tab by mouth 2 times a day as needed.   Morphine Sulfate ER 15 MG Tablet Extended Release 12 hour Abuse-Deterrent  Family Member Yes No   Sig: Take 30 mg by mouth 2 Times a Day.   omeprazole (PRILOSEC)  40 MG delayed-release capsule 10/29/2019 at 1000 Family Member No No   Sig: Take 1 Cap by mouth every day.   ondansetron (ZOFRAN ODT) 8 MG TABLET DISPERSIBLE 10/29/2019 at 1000 Family Member Yes No   Sig: Take 8 mg by mouth every 8 hours as needed for Nausea.   oxycodone (OXY-IR) 15 MG immediate release tablet 10/29/2019 at 1000 Family Member Yes No   Sig: Take 7.5 mg by mouth every 3 hours as needed for Severe Pain.   prochlorperazine (COMPAZINE) 10 MG Tab 10/29/2019 at 1000  Yes No   Sig: Take 10 mg by mouth every 6 hours as needed. AS NEEDED FOR NAUSEA   promethazine (PHENERGAN) 25 MG Tab  Family Member No No   Sig: Take 0.5 Tabs by mouth every 6 hours as needed.      Facility-Administered Medications: None       Physical Exam  Temp:  [37.4 °C (99.3 °F)] 37.4 °C (99.3 °F)  Pulse:  [91] 91  Resp:  [18] 18  BP: (122)/(60) 122/60  SpO2:  [96 %] 96 %    Physical Exam   Constitutional: He is oriented to person, place, and time. No distress.   HENT:   Head: Normocephalic and atraumatic.   Eyes: Pupils are equal, round, and reactive to light. Right eye exhibits no discharge. Left eye exhibits no discharge.   Neck: Normal range of motion. Neck supple.   Cardiovascular: Normal rate and regular rhythm. Exam reveals no friction rub.   No murmur heard.  Pulmonary/Chest: Effort normal and breath sounds normal. No respiratory distress. He has no wheezes.   Abdominal: Soft. Bowel sounds are normal. He exhibits no distension. There is tenderness (Mild epigastrium). There is no rebound.   Musculoskeletal: Normal range of motion. He exhibits no edema or deformity.   Neurological: He is alert and oriented to person, place, and time. No cranial nerve deficit. Coordination normal.   Skin: Skin is warm and dry. No rash noted. He is not diaphoretic. No erythema.   Psychiatric: He has a normal mood and affect. His behavior is normal.       Laboratory:          No results for input(s): ALTSGPT, ASTSGOT, ALKPHOSPHAT, TBILIRUBIN,  DBILIRUBIN, GAMMAGT, AMYLASE, LIPASE, ALB, PREALBUMIN, GLUCOSE in the last 72 hours.      No results for input(s): NTPROBNP in the last 72 hours.      No results for input(s): TROPONINT in the last 72 hours.    Urinalysis:    No results found     Imaging:  IR-CONSULT AND TREAT    (Results Pending)         Assessment/Plan:  I anticipate this patient will require at least two midnights for appropriate medical management, necessitating inpatient admission.    Dehydration, moderate- (present on admission)  Assessment & Plan  He found to have dehydration on clinical exam.  He has not been eating and drinking due to abdominal pain and nausea.  I started him on IV fluids  I ordered lab work-up which include CBC, CMP and magnesium.      Loss of weight- (present on admission)  Assessment & Plan  Due to his poor oral intake and cancer he reported significant weight loss.  I started him on IV fluid for hydration.  I requested nutrition consult.    Intractable vomiting with nausea- (present on admission)  Assessment & Plan  You found to have prolonged QTC on prior EKG.  I resume his outpatient nausea medication.  I ordered IV Compazine.  I ordered EKG to evaluate for QTC.    Tobacco abuse- (present on admission)  Assessment & Plan  He reported that he is using only 1 to 2 cigarettes/day and he expressed that he would be okay without nicotine replacement.      Gastric cancer (HCC)- (present on admission)  Assessment & Plan  He has history of gastric cancer and he has been following Dr. Keyes oncology for chemotherapy.  Due to his poor oral intake examined to the hospital for hydration and placement of J-tube.  Dr. Simpson recommended to place plain J-tube and avoid G-tube and GJ tube.  I requested consult with interventional radiology    Abdominal pain, epigastric- (present on admission)  Assessment & Plan  He has been having ongoing abdominal pain.  I continue his outpatient pain medication per  I ordered IV morphine for  breakthrough point with holding parameters.  Monitor for adverse effect of pain medication.  He needs CT scan chest thorax and abdominal.  I am holding CT scan order to evaluate for his renal function prior to order CT scan.  I discussed about pain management with him.       I discussed plan of care with RN at the bedside.    VTE prophylaxis: SCDs (as he is going to have IR guided procedure) after procedure he may need chemical DVT prophylaxis.

## 2019-10-30 NOTE — PROGRESS NOTES
Pt alert and oriented x3. C/o nausea and pain. Medicated per orders. Npo after MN. Bowel sounds present. IV patent and dry and intact. Fluids per orders. Will continue to monitor.

## 2019-10-30 NOTE — DIETARY
"Nutrition Support Assessment:  Day 1 of admit.  Tima Rizzo is a 49 y.o. male with admitting DX of dehydration, failure to thrive.    Hx of gastric cancer; s/p chemotherapy.  Spoke with pt at bedside. Pt was sitting up in bed with severe fat loss evidenced by hollowed temporals and orbital region, pronounced zygomatic arches; severe muscle loss evidenced by scooping temples, and squaring of shoulders.  Pt reports a good appetite, but stated nausea and early satiety after PO intake. Pt stated he has been able to tolerate small amounts of food at home, such as cold cereal with milk. Pt reports a UBW of ~165 lbs for most of his adult life, but has experienced a 40 wt loss since beginning chemotherapy in May 2019. Discussed protein shakes with pt, pt stated that he was unable to tolerate cold items and the shakes provided too much volume.  Pt has been unable to meet estimated nutrition needs at home via PO intake.     Current problem list:  1. Dehydration   2. Loss of weight  3. Intractable vomiting with nausea  4. Gastric cancer  5. Tobacco abuse  6. Abdominal pain, epigastric     Assessment:  Estimated Nutritional Needs based on:   Height: 170.2 cm (5' 7\")  Weight: 55.1 kg (121 lb 7.6 oz)  Weight to Use in Calculations: 55.1 kg (121 lb 7.6 oz) - stand up scale post admit  Ideal Body Weight: 67.1 kg (148 lb)  Percent Ideal Body Weight: 82.1  Body mass index is 19.03 kg/m²., BMI classification: normal    Calculation/Equation: MSJ x 1.2 = 1788 kcals/day; HBE x 1.15 - 1.3 = 1566 - 1770 kcals/day  Total Calories / day: 1788 - 1929 (Calories / k - 35)  Total Grams Protein / day: 72 - 83 (Grams Protein / k.3 - 1.5) - increased needs d/t hypermetabolic disease state     Evaluation:   1. J-tube placement scheduled for 10/31.  2. Consult received to begin tube feeding; tube feeding to meet 100% of pt's estimated nutrition needs at this time.   3. Continuous feeds via pump (unable to tolerate bolus feeds via " J-tube); pt will need a pump for home tube feeding, discussed with floor case coordinator.  4. Pt wt hx per chart review:  5/5 154 lbs, 8/15 132 lbs  5. Wt loss of 27% in six months is severe.  6. Pertinent meds: Prilosec, Pericolace  7. 24 hr continuous tube feeding to begin, will transition pt to nocturnal tube feedings prior to discharge.    8. Per RD judgement, pt at risk for refeeding.  9. Standard formula will meet pt's estimated nutrition needs; please provide equivalent formula for home tube feeding.     Malnutrition Risk: Pt with severe chronic disease related malnutrition r/t gastric cancer as evidenced by 27% wt loss over 5-6 months and PO intake meeting <75% of estimated needs for > 1 month.     Recommendations/Plan:  Start feeds of Fibersource HN per MD @ 10 ml/hr and advance 25 mL/hr every 6 hours until goal of 65 mL/hr is reached.  Tube feeding @ goal provides 1872 kcals, 84 grams of protein and 1264 mL of free water per day.  Turn off TF in the morning and start nocturnal TF regimen.   Final nocturnal TF of Fibersource HN @ 130 mL/hr x 12 hours (run from 1800 -0600) to provide 1872 kcal (34 kcal/kg), 84 grams protein (1.5 gm/kg), and 1264 ml free water per day.   Fluids per MD.  PO diet per SLP/MD.  Monitor for refeeding: Order BMP w/ Mg and Phos x 7 days. Replete K, Phos and Mg prn. Supplement 100 mg Thiamine x 7 days to reduce risk of refeeding.    RD following.

## 2019-10-30 NOTE — ASSESSMENT & PLAN NOTE
He has history of gastric cancer and he has been following Dr. Keyes oncology for chemotherapy.  Due to his poor oral intake examined to the hospital for hydration and placement of J-tube.  Dr. Simpson recommended to place plain J-tube and avoid G-tube and GJ tube.  I requested consult with interventional radiology

## 2019-10-30 NOTE — CARE PLAN
Problem: Safety  Goal: Will remain free from injury  Outcome: PROGRESSING AS EXPECTED  Note:   Patient free from accidental injury at this time safety precautions in place. Hourly rounding in place.

## 2019-10-30 NOTE — PROGRESS NOTES
2 RN skin check complete with Demetrice BENITEZ.   Skin assessed to find small scabs throughout body. primarily on the upper thighs and chest  Sacrum pink and blanching.  Skin intact otherwise  Mepilex placed

## 2019-10-30 NOTE — ASSESSMENT & PLAN NOTE
Due to his poor oral intake and cancer he reported significant weight loss.  I started him on IV fluid for hydration.  I requested nutrition consult.

## 2019-10-31 ENCOUNTER — ANESTHESIA EVENT (OUTPATIENT)
Dept: SURGERY | Facility: MEDICAL CENTER | Age: 49
DRG: 374 | End: 2019-10-31
Payer: MEDICARE

## 2019-10-31 ENCOUNTER — APPOINTMENT (OUTPATIENT)
Dept: RADIOLOGY | Facility: MEDICAL CENTER | Age: 49
DRG: 374 | End: 2019-10-31
Attending: STUDENT IN AN ORGANIZED HEALTH CARE EDUCATION/TRAINING PROGRAM
Payer: MEDICARE

## 2019-10-31 ENCOUNTER — APPOINTMENT (OUTPATIENT)
Dept: RADIOLOGY | Facility: MEDICAL CENTER | Age: 49
DRG: 374 | End: 2019-10-31
Attending: SURGERY
Payer: MEDICARE

## 2019-10-31 ENCOUNTER — ANESTHESIA (OUTPATIENT)
Dept: SURGERY | Facility: MEDICAL CENTER | Age: 49
DRG: 374 | End: 2019-10-31
Payer: MEDICARE

## 2019-10-31 LAB
BASOPHILS # BLD AUTO: 0.7 % (ref 0–1.8)
BASOPHILS # BLD: 0.06 K/UL (ref 0–0.12)
CRP SERPL HS-MCNC: 0.25 MG/DL (ref 0–0.75)
EOSINOPHIL # BLD AUTO: 0.02 K/UL (ref 0–0.51)
EOSINOPHIL NFR BLD: 0.2 % (ref 0–6.9)
ERYTHROCYTE [DISTWIDTH] IN BLOOD BY AUTOMATED COUNT: 63.5 FL (ref 35.9–50)
FERRITIN SERPL-MCNC: 27.6 NG/ML (ref 22–322)
HCT VFR BLD AUTO: 30.1 % (ref 42–52)
HGB BLD-MCNC: 9.7 G/DL (ref 14–18)
IMM GRANULOCYTES # BLD AUTO: 0.03 K/UL (ref 0–0.11)
IMM GRANULOCYTES NFR BLD AUTO: 0.3 % (ref 0–0.9)
IRON SATN MFR SERPL: 15 % (ref 15–55)
IRON SERPL-MCNC: 54 UG/DL (ref 50–180)
LYMPHOCYTES # BLD AUTO: 2.35 K/UL (ref 1–4.8)
LYMPHOCYTES NFR BLD: 26.8 % (ref 22–41)
MAGNESIUM SERPL-MCNC: 1.9 MG/DL (ref 1.5–2.5)
MCH RBC QN AUTO: 28.9 PG (ref 27–33)
MCHC RBC AUTO-ENTMCNC: 32.2 G/DL (ref 33.7–35.3)
MCV RBC AUTO: 89.6 FL (ref 81.4–97.8)
MONOCYTES # BLD AUTO: 0.66 K/UL (ref 0–0.85)
MONOCYTES NFR BLD AUTO: 7.5 % (ref 0–13.4)
NEUTROPHILS # BLD AUTO: 5.66 K/UL (ref 1.82–7.42)
NEUTROPHILS NFR BLD: 64.5 % (ref 44–72)
NRBC # BLD AUTO: 0 K/UL
NRBC BLD-RTO: 0 /100 WBC
PHOSPHATE SERPL-MCNC: 3.6 MG/DL (ref 2.5–4.5)
PLATELET # BLD AUTO: 291 K/UL (ref 164–446)
PMV BLD AUTO: 9.6 FL (ref 9–12.9)
PREALB SERPL-MCNC: 18 MG/DL (ref 18–38)
RBC # BLD AUTO: 3.36 M/UL (ref 4.7–6.1)
TIBC SERPL-MCNC: 350 UG/DL (ref 250–450)
TRANSFERRIN SERPL-MCNC: 250 MG/DL (ref 200–370)
WBC # BLD AUTO: 8.8 K/UL (ref 4.8–10.8)

## 2019-10-31 PROCEDURE — 85025 COMPLETE CBC W/AUTO DIFF WBC: CPT

## 2019-10-31 PROCEDURE — A9270 NON-COVERED ITEM OR SERVICE: HCPCS | Performed by: INTERNAL MEDICINE

## 2019-10-31 PROCEDURE — 0DHA3UZ INSERTION OF FEEDING DEVICE INTO JEJUNUM, PERCUTANEOUS APPROACH: ICD-10-PCS | Performed by: SURGERY

## 2019-10-31 PROCEDURE — 160035 HCHG PACU - 1ST 60 MINS PHASE I: Performed by: SURGERY

## 2019-10-31 PROCEDURE — 700102 HCHG RX REV CODE 250 W/ 637 OVERRIDE(OP): Performed by: ANESTHESIOLOGY

## 2019-10-31 PROCEDURE — 500868 HCHG NEEDLE, SURGI(VARES): Performed by: SURGERY

## 2019-10-31 PROCEDURE — 501838 HCHG SUTURE GENERAL: Performed by: SURGERY

## 2019-10-31 PROCEDURE — 700102 HCHG RX REV CODE 250 W/ 637 OVERRIDE(OP): Performed by: INTERNAL MEDICINE

## 2019-10-31 PROCEDURE — 160042 HCHG SURGERY MINUTES - EA ADDL 1 MIN LEVEL 5: Performed by: SURGERY

## 2019-10-31 PROCEDURE — 700111 HCHG RX REV CODE 636 W/ 250 OVERRIDE (IP): Performed by: ANESTHESIOLOGY

## 2019-10-31 PROCEDURE — 84134 ASSAY OF PREALBUMIN: CPT

## 2019-10-31 PROCEDURE — 160009 HCHG ANES TIME/MIN: Performed by: SURGERY

## 2019-10-31 PROCEDURE — 88305 TISSUE EXAM BY PATHOLOGIST: CPT

## 2019-10-31 PROCEDURE — 700101 HCHG RX REV CODE 250: Performed by: ANESTHESIOLOGY

## 2019-10-31 PROCEDURE — 501623 HCHG TUBE, GASTROSTOMY: Performed by: SURGERY

## 2019-10-31 PROCEDURE — 83550 IRON BINDING TEST: CPT

## 2019-10-31 PROCEDURE — 500371 HCHG DRAIN, BLAKE 10MM: Performed by: SURGERY

## 2019-10-31 PROCEDURE — 500448 HCHG DRESSING, TELFA 3X4: Performed by: SURGERY

## 2019-10-31 PROCEDURE — 160031 HCHG SURGERY MINUTES - 1ST 30 MINS LEVEL 5: Performed by: SURGERY

## 2019-10-31 PROCEDURE — 500002 HCHG ADHESIVE, DERMABOND: Performed by: SURGERY

## 2019-10-31 PROCEDURE — 36415 COLL VENOUS BLD VENIPUNCTURE: CPT

## 2019-10-31 PROCEDURE — A9270 NON-COVERED ITEM OR SERVICE: HCPCS | Performed by: ANESTHESIOLOGY

## 2019-10-31 PROCEDURE — 501445 HCHG STAPLER, SKIN DISP: Performed by: SURGERY

## 2019-10-31 PROCEDURE — 700111 HCHG RX REV CODE 636 W/ 250 OVERRIDE (IP)

## 2019-10-31 PROCEDURE — 84100 ASSAY OF PHOSPHORUS: CPT

## 2019-10-31 PROCEDURE — 83540 ASSAY OF IRON: CPT

## 2019-10-31 PROCEDURE — 160048 HCHG OR STATISTICAL LEVEL 1-5: Performed by: SURGERY

## 2019-10-31 PROCEDURE — 700101 HCHG RX REV CODE 250: Performed by: SURGERY

## 2019-10-31 PROCEDURE — 86140 C-REACTIVE PROTEIN: CPT

## 2019-10-31 PROCEDURE — 82728 ASSAY OF FERRITIN: CPT

## 2019-10-31 PROCEDURE — 0DBW4ZX EXCISION OF PERITONEUM, PERCUTANEOUS ENDOSCOPIC APPROACH, DIAGNOSTIC: ICD-10-PCS | Performed by: SURGERY

## 2019-10-31 PROCEDURE — 160002 HCHG RECOVERY MINUTES (STAT): Performed by: SURGERY

## 2019-10-31 PROCEDURE — A6402 STERILE GAUZE <= 16 SQ IN: HCPCS | Performed by: SURGERY

## 2019-10-31 PROCEDURE — 700105 HCHG RX REV CODE 258: Performed by: INTERNAL MEDICINE

## 2019-10-31 PROCEDURE — 83735 ASSAY OF MAGNESIUM: CPT

## 2019-10-31 PROCEDURE — 160036 HCHG PACU - EA ADDL 30 MINS PHASE I: Performed by: SURGERY

## 2019-10-31 PROCEDURE — 770001 HCHG ROOM/CARE - MED/SURG/GYN PRIV*

## 2019-10-31 PROCEDURE — 84466 ASSAY OF TRANSFERRIN: CPT

## 2019-10-31 PROCEDURE — 700105 HCHG RX REV CODE 258: Performed by: ANESTHESIOLOGY

## 2019-10-31 PROCEDURE — 502714 HCHG ROBOTIC SURGERY SERVICES: Performed by: SURGERY

## 2019-10-31 PROCEDURE — 8E0W4CZ ROBOTIC ASSISTED PROCEDURE OF TRUNK REGION, PERCUTANEOUS ENDOSCOPIC APPROACH: ICD-10-PCS | Performed by: SURGERY

## 2019-10-31 PROCEDURE — 500389 HCHG DRAIN, RESERVOIR SUCT JP 100CC: Performed by: SURGERY

## 2019-10-31 RX ORDER — MEPERIDINE HYDROCHLORIDE 50 MG/ML
50 INJECTION INTRAMUSCULAR; INTRAVENOUS; SUBCUTANEOUS
Status: DISCONTINUED | OUTPATIENT
Start: 2019-10-31 | End: 2019-10-31 | Stop reason: HOSPADM

## 2019-10-31 RX ORDER — ZOLPIDEM TARTRATE 5 MG/1
5-10 TABLET ORAL NIGHTLY PRN
Status: DISCONTINUED | OUTPATIENT
Start: 2019-10-31 | End: 2019-11-09 | Stop reason: HOSPADM

## 2019-10-31 RX ORDER — ONDANSETRON 2 MG/ML
INJECTION INTRAMUSCULAR; INTRAVENOUS PRN
Status: DISCONTINUED | OUTPATIENT
Start: 2019-10-31 | End: 2019-10-31 | Stop reason: SURG

## 2019-10-31 RX ORDER — HALOPERIDOL 5 MG/ML
1 INJECTION INTRAMUSCULAR EVERY 4 HOURS PRN
Status: DISCONTINUED | OUTPATIENT
Start: 2019-10-31 | End: 2019-10-31 | Stop reason: HOSPADM

## 2019-10-31 RX ORDER — HYDROMORPHONE HYDROCHLORIDE 2 MG/ML
1 INJECTION, SOLUTION INTRAMUSCULAR; INTRAVENOUS; SUBCUTANEOUS
Status: DISCONTINUED | OUTPATIENT
Start: 2019-10-31 | End: 2019-10-31 | Stop reason: HOSPADM

## 2019-10-31 RX ORDER — LORAZEPAM 2 MG/ML
1 INJECTION INTRAMUSCULAR
Status: DISCONTINUED | OUTPATIENT
Start: 2019-10-31 | End: 2019-10-31 | Stop reason: HOSPADM

## 2019-10-31 RX ORDER — SUCCINYLCHOLINE CHLORIDE 20 MG/ML
INJECTION INTRAMUSCULAR; INTRAVENOUS PRN
Status: DISCONTINUED | OUTPATIENT
Start: 2019-10-31 | End: 2019-10-31 | Stop reason: SURG

## 2019-10-31 RX ORDER — SODIUM CHLORIDE, SODIUM LACTATE, POTASSIUM CHLORIDE, CALCIUM CHLORIDE 600; 310; 30; 20 MG/100ML; MG/100ML; MG/100ML; MG/100ML
INJECTION, SOLUTION INTRAVENOUS CONTINUOUS
Status: DISCONTINUED | OUTPATIENT
Start: 2019-10-31 | End: 2019-10-31 | Stop reason: HOSPADM

## 2019-10-31 RX ORDER — HYDROMORPHONE HYDROCHLORIDE 1 MG/ML
INJECTION, SOLUTION INTRAMUSCULAR; INTRAVENOUS; SUBCUTANEOUS
Status: COMPLETED
Start: 2019-10-31 | End: 2019-10-31

## 2019-10-31 RX ORDER — OXYCODONE HCL 5 MG/5 ML
5 SOLUTION, ORAL ORAL
Status: COMPLETED | OUTPATIENT
Start: 2019-10-31 | End: 2019-10-31

## 2019-10-31 RX ORDER — OXYCODONE HCL 5 MG/5 ML
10 SOLUTION, ORAL ORAL
Status: COMPLETED | OUTPATIENT
Start: 2019-10-31 | End: 2019-10-31

## 2019-10-31 RX ORDER — ONDANSETRON 2 MG/ML
4 INJECTION INTRAMUSCULAR; INTRAVENOUS
Status: DISCONTINUED | OUTPATIENT
Start: 2019-10-31 | End: 2019-10-31 | Stop reason: HOSPADM

## 2019-10-31 RX ORDER — ROCURONIUM BROMIDE 10 MG/ML
INJECTION, SOLUTION INTRAVENOUS PRN
Status: DISCONTINUED | OUTPATIENT
Start: 2019-10-31 | End: 2019-10-31 | Stop reason: SURG

## 2019-10-31 RX ORDER — HALOPERIDOL 5 MG/ML
1 INJECTION INTRAMUSCULAR
Status: DISCONTINUED | OUTPATIENT
Start: 2019-10-31 | End: 2019-10-31 | Stop reason: HOSPADM

## 2019-10-31 RX ORDER — HALOPERIDOL 5 MG/ML
INJECTION INTRAMUSCULAR
Status: COMPLETED
Start: 2019-10-31 | End: 2019-10-31

## 2019-10-31 RX ORDER — SODIUM CHLORIDE, SODIUM LACTATE, POTASSIUM CHLORIDE, CALCIUM CHLORIDE 600; 310; 30; 20 MG/100ML; MG/100ML; MG/100ML; MG/100ML
INJECTION, SOLUTION INTRAVENOUS
Status: DISCONTINUED | OUTPATIENT
Start: 2019-10-31 | End: 2019-10-31 | Stop reason: SURG

## 2019-10-31 RX ORDER — CEFOTETAN DISODIUM 2 G/20ML
INJECTION, POWDER, FOR SOLUTION INTRAMUSCULAR; INTRAVENOUS PRN
Status: DISCONTINUED | OUTPATIENT
Start: 2019-10-31 | End: 2019-10-31 | Stop reason: SURG

## 2019-10-31 RX ORDER — LORAZEPAM 1 MG/1
0.5 TABLET ORAL EVERY 4 HOURS PRN
Status: DISCONTINUED | OUTPATIENT
Start: 2019-10-31 | End: 2019-11-09 | Stop reason: HOSPADM

## 2019-10-31 RX ORDER — BUPIVACAINE HYDROCHLORIDE AND EPINEPHRINE 5; 5 MG/ML; UG/ML
INJECTION, SOLUTION EPIDURAL; INTRACAUDAL; PERINEURAL
Status: DISCONTINUED | OUTPATIENT
Start: 2019-10-31 | End: 2019-10-31 | Stop reason: HOSPADM

## 2019-10-31 RX ORDER — LABETALOL HYDROCHLORIDE 5 MG/ML
5 INJECTION, SOLUTION INTRAVENOUS
Status: DISCONTINUED | OUTPATIENT
Start: 2019-10-31 | End: 2019-10-31 | Stop reason: HOSPADM

## 2019-10-31 RX ORDER — MEPERIDINE HYDROCHLORIDE 25 MG/ML
INJECTION INTRAMUSCULAR; INTRAVENOUS; SUBCUTANEOUS PRN
Status: DISCONTINUED | OUTPATIENT
Start: 2019-10-31 | End: 2019-10-31 | Stop reason: SURG

## 2019-10-31 RX ORDER — DIPHENHYDRAMINE HYDROCHLORIDE 50 MG/ML
12.5 INJECTION INTRAMUSCULAR; INTRAVENOUS
Status: DISCONTINUED | OUTPATIENT
Start: 2019-10-31 | End: 2019-10-31 | Stop reason: HOSPADM

## 2019-10-31 RX ADMIN — CEFOTETAN DISODIUM 2 G: 2 INJECTION, POWDER, FOR SOLUTION INTRAMUSCULAR; INTRAVENOUS at 17:42

## 2019-10-31 RX ADMIN — ROCURONIUM BROMIDE 10 MG: 10 INJECTION, SOLUTION INTRAVENOUS at 18:02

## 2019-10-31 RX ADMIN — FENTANYL CITRATE 50 MCG: 50 INJECTION, SOLUTION INTRAMUSCULAR; INTRAVENOUS at 19:02

## 2019-10-31 RX ADMIN — SENNOSIDES AND DOCUSATE SODIUM 2 TABLET: 8.6; 5 TABLET ORAL at 04:14

## 2019-10-31 RX ADMIN — OMEPRAZOLE 40 MG: 20 CAPSULE, DELAYED RELEASE ORAL at 04:14

## 2019-10-31 RX ADMIN — HYDROMORPHONE HYDROCHLORIDE 1 MG: 1 INJECTION, SOLUTION INTRAMUSCULAR; INTRAVENOUS; SUBCUTANEOUS at 20:29

## 2019-10-31 RX ADMIN — HALOPERIDOL LACTATE 1 MG: 5 INJECTION, SOLUTION INTRAMUSCULAR at 20:30

## 2019-10-31 RX ADMIN — OXYCODONE HYDROCHLORIDE 7.5 MG: 5 TABLET ORAL at 03:52

## 2019-10-31 RX ADMIN — OXYCODONE HYDROCHLORIDE 10 MG: 5 SOLUTION ORAL at 19:38

## 2019-10-31 RX ADMIN — Medication 1 MG: at 20:29

## 2019-10-31 RX ADMIN — PROPOFOL 20 MG: 10 INJECTION, EMULSION INTRAVENOUS at 17:43

## 2019-10-31 RX ADMIN — MEPERIDINE HYDROCHLORIDE 25 MG: 25 INJECTION INTRAMUSCULAR; INTRAVENOUS; SUBCUTANEOUS at 17:52

## 2019-10-31 RX ADMIN — SODIUM CHLORIDE, POTASSIUM CHLORIDE, SODIUM LACTATE AND CALCIUM CHLORIDE: 600; 310; 30; 20 INJECTION, SOLUTION INTRAVENOUS at 17:42

## 2019-10-31 RX ADMIN — SODIUM CHLORIDE: 9 INJECTION, SOLUTION INTRAVENOUS at 21:08

## 2019-10-31 RX ADMIN — PROCHLORPERAZINE MALEATE 10 MG: 10 TABLET ORAL at 03:51

## 2019-10-31 RX ADMIN — MEPERIDINE HYDROCHLORIDE 25 MG: 25 INJECTION INTRAMUSCULAR; INTRAVENOUS; SUBCUTANEOUS at 18:47

## 2019-10-31 RX ADMIN — OXYCODONE HYDROCHLORIDE 7.5 MG: 5 TABLET ORAL at 22:44

## 2019-10-31 RX ADMIN — SUCCINYLCHOLINE CHLORIDE 120 MG: 20 INJECTION, SOLUTION INTRAMUSCULAR; INTRAVENOUS at 17:45

## 2019-10-31 RX ADMIN — TAMSULOSIN HYDROCHLORIDE 0.4 MG: 0.4 CAPSULE ORAL at 08:43

## 2019-10-31 RX ADMIN — HYDROMORPHONE HYDROCHLORIDE 1 MG: 1 INJECTION, SOLUTION INTRAMUSCULAR; INTRAVENOUS; SUBCUTANEOUS at 19:44

## 2019-10-31 RX ADMIN — ROCURONIUM BROMIDE 5 MG: 10 INJECTION, SOLUTION INTRAVENOUS at 17:43

## 2019-10-31 RX ADMIN — SODIUM CHLORIDE, POTASSIUM CHLORIDE, SODIUM LACTATE AND CALCIUM CHLORIDE: 600; 310; 30; 20 INJECTION, SOLUTION INTRAVENOUS at 18:59

## 2019-10-31 RX ADMIN — HYDROMORPHONE HYDROCHLORIDE 1 MG: 1 INJECTION, SOLUTION INTRAMUSCULAR; INTRAVENOUS; SUBCUTANEOUS at 20:14

## 2019-10-31 RX ADMIN — MORPHINE SULFATE 15 MG: 15 TABLET, EXTENDED RELEASE ORAL at 20:59

## 2019-10-31 RX ADMIN — ALFENTANIL HYDROCHLORIDE 1000 MCG: 500 INJECTION INTRAVENOUS at 17:43

## 2019-10-31 RX ADMIN — SODIUM CHLORIDE: 9 INJECTION, SOLUTION INTRAVENOUS at 04:16

## 2019-10-31 RX ADMIN — Medication 1 MG: at 20:14

## 2019-10-31 RX ADMIN — ACETAMINOPHEN 650 MG: 325 TABLET, FILM COATED ORAL at 03:52

## 2019-10-31 RX ADMIN — HALOPERIDOL 1 MG: 5 INJECTION INTRAMUSCULAR at 20:30

## 2019-10-31 RX ADMIN — PROPOFOL 80 MG: 10 INJECTION, EMULSION INTRAVENOUS at 17:45

## 2019-10-31 RX ADMIN — MORPHINE SULFATE 15 MG: 15 TABLET, EXTENDED RELEASE ORAL at 04:14

## 2019-10-31 RX ADMIN — ONDANSETRON 4 MG: 2 INJECTION INTRAMUSCULAR; INTRAVENOUS at 19:14

## 2019-10-31 RX ADMIN — ROCURONIUM BROMIDE 20 MG: 10 INJECTION, SOLUTION INTRAVENOUS at 17:52

## 2019-10-31 ASSESSMENT — PAIN SCALES - GENERAL: PAIN_LEVEL: 6

## 2019-10-31 NOTE — PROGRESS NOTES
Pt educated extensively on the importance maintaining the plan of care for the hospital stay and to notify his mother who visits frequently that it is against hospital policy for him to be in possession of his own home medication or recreational drugs. Pt reports understanding and insists that he does not have any other substance in his possession. Patient's vital signs are stable. Charge RN notified.

## 2019-10-31 NOTE — PROGRESS NOTES
Pt laying in bed, call light within reach, bed lowered and locked, fall education reinforced. Pt is A&Ox4 and on room air. Pt lung sounds are clear in all lobes, bowel sounds are normoactive in all four quadrants, heart sounds are within defined limits. Pt IV is clean,dry,intact,and patent and infusing the appropriate fluids. Pt is continent of bowel and urine. Pt is up stand-by to ambulate with a steady gait. Pt reports continuing pain in the abdomen, prn medications given. Pt has a right upper chest port not accessed. Pt to be NPO at midnight for procedure at 1700 tomorrow with Dr. Cisneros

## 2019-10-31 NOTE — PROGRESS NOTES
"Patient is refusing lab draw stating \"I don't want them sticking me 6 times, there's a reason I got this port, I don't understand why they can't just draw from the port.\" I called Dr. Marie of the [facility] hospitalist team and spoke to him about accessing the patient's port. Orders were not given and this Rn encouraged the patient again to have his blood taken for labs and emphasized the importance of having updated lab tests before surgery. Pt agrees to have blood taken if necessary but continues to express wishes of having his port accessed  "

## 2019-10-31 NOTE — PROGRESS NOTES
"  [facility] [place] [facility] [place]     Attending: TIN Bales M.D.  Senior Resident: Jonas Vallejo M.D. (PGY-3)  PATIENT: Tima Rizzo; [id number]; 1970    ID: 49 y.o. male with a history of gastric cancer admitted for J-tube placement.    Overnight Events: No acute events overnight.  Patient was found to have small supplies of his own outpatient medications which were logged into the pharmacy amatory.    Subjective: Patient is \"hungry.\"  He is also anxious regarding his procedure this afternoon.    OBJECTIVE:  Temp:  [36.6 °C (97.9 °F)-36.9 °C (98.5 °F)] 36.9 °C (98.5 °F)  Pulse:  [67-92] 68  Resp:  [16-18] 16  BP: (118-148)/() 140/95  SpO2:  [96 %-99 %] 98 %    Intake/Output Summary (Last 24 hours) at 10/31/2019 0618  Last data filed at 10/31/2019 0344  Gross per 24 hour   Intake 2720 ml   Output 0 ml   Net 2720 ml       PE:  Gen: No Acute Distress   HEENT: NCAT   Pulm: CTABL, no respiratory distress   Cardio: NS1S2 NMRG   Abdom: NTND BS+  Ext: No edema, 2+ pulses     LABS:  Recent Labs     10/30/19  0509 10/31/19  0414   WBC 9.4 8.8   RBC 3.95* 3.36*   HEMOGLOBIN 11.5* 9.7*   HEMATOCRIT 35.9* 30.1*   MCV 90.9 89.6   MCH 29.1 28.9   RDW 64.8* 63.5*   PLATELETCT 275 291   MPV 11.3 9.6   NEUTSPOLYS 61.20 64.50   LYMPHOCYTES 30.40 26.80   MONOCYTES 7.40 7.50   EOSINOPHILS 0.30 0.20   BASOPHILS 0.30 0.70     Recent Labs     10/30/19  0509 10/31/19  0414   SODIUM 137  --    POTASSIUM 3.9  --    CHLORIDE 104  --    CO2 26  --    BUN 14  --    CREATININE 0.75  --    CALCIUM 8.5  --    MAGNESIUM 2.0 1.9   PHOSPHORUS 3.3 3.6   ALBUMIN 3.6  --      Estimated GFR/CRCL = Estimated Creatinine Clearance: 92.9 mL/min (by C-G formula based on SCr of 0.75 mg/dL).  Recent Labs     10/30/19  0509   GLUCOSE 88     Recent Labs     10/30/19  0509   ASTSGOT 15   ALTSGPT 13   TBILIRUBIN 0.4   ALKPHOSPHAT 63   GLOBULIN 2.9   INR 1.02             Recent Labs     10/30/19  0509   INR 1.02       MICROBIOLOGY:   No results found " for: BLOODCULTU, BLDCULT, BCHOLD     IMAGING:   CT-CHEST,ABDOMEN,PELVIS WITH    (Results Pending)       MEDS:  Current Facility-Administered Medications   Medication Last Dose   • zolpidem (AMBIEN) tablet 5-10 mg     • LORazepam (ATIVAN) tablet 0.5 mg     • Pharmacy Consult: Enteral tube insertion - review meds/change route/product selection     • senna-docusate (PERICOLACE or SENOKOT S) 8.6-50 MG per tablet 2 Tab 2 Tab at 10/31/19 0414    And   • polyethylene glycol/lytes (MIRALAX) PACKET 1 Packet      And   • magnesium hydroxide (MILK OF MAGNESIA) suspension 30 mL      And   • bisacodyl (DULCOLAX) suppository 10 mg     • acetaminophen (TYLENOL) tablet 650 mg 650 mg at 10/31/19 0352   • ondansetron (ZOFRAN ODT) dispertab 8 mg     • prochlorperazine (COMPAZINE) tablet 10 mg 10 mg at 10/31/19 0351   • promethazine (PHENERGAN) tablet 12.5 mg 12.5 mg at 10/30/19 1722   • tamsulosin (FLOMAX) capsule 0.4 mg     • morphine ER (MS CONTIN) tablet 15 mg 15 mg at 10/31/19 0414   • omeprazole (PRILOSEC) capsule 40 mg 40 mg at 10/31/19 0414   • oxyCODONE immediate-release (ROXICODONE) tablet 7.5 mg 7.5 mg at 10/31/19 0352   • NS infusion     • morphine (pf) 4 MG/ML injection 2 mg     • prochlorperazine (COMPAZINE) injection 10 mg         PROBLEM LIST:  No problems updated.    ASSESSMENT/PLAN: 49 y.o. male who was a direct admit for J-tube placement 2/2 to nausea, emesis, and pain all exacerbated by PO intake 2/2 gastric cancer.      # Nausea/emesis  # Gastric cancer s/p chemo therapy 3 weeks prior to admission.  - Pt to undergo J-tube placement with IR today  - NPO for procedure.  - CT Chest abdomen and pelvis today  - Continue IV maintainance fluids.  - Nutrition consultation placed for feeding recommendations  -Morphine IR 15 mg PO BiD  -Morphine 2 mg IV Q 6hr PRN  -Oxy 7.5 mg PO q3hr PRN     #BPH  - Continue:  Flomax 0.4 mg PO daily    #Insomnia   -Zolpedem 5-10mg QHS (hold for sedation)     # Microcytic anemia  (stable)     #Core Measures   VTE PPx:Hold for procedure today  Abx:N/A  Lines/Tubes:perfieral IV  Fluids:NS @ 100 mL/hr  Diet: NPO  Code Status: Full code     oJnas Vallejo M.D.   PGY-3  [facility]   [phone]

## 2019-10-31 NOTE — CARE PLAN
Problem: Safety  Goal: Will remain free from injury  Outcome: PROGRESSING AS EXPECTED  Note:   Patient free from accidental injury at this time. Safety precautions in place. Hourly rounding in place   Problem: Pain Management  Goal: Pain level will decrease to patient's comfort goal  Outcome: PROGRESSING AS EXPECTED  Note:   Patient experiencing pain relief with MAR medication. Patient encouraged to call is pain worsens. Hourly rounding in place.

## 2019-10-31 NOTE — NON-PROVIDER
"  [facility] [place] [facility] [place]     Attending: Eve Bales M.D.  Senior Resident: Jonas Vallejo M.D. (PGY-3)  PATIENT: Tima Rizzo; [id number]; 1970    ID: 49 y.o. male admitted for dehydration and J-tube placement    Overnight Events: No acute events overnight     Subjective: Tima Rizzo is a pleasant 49 y.o male with the PMH of gastric cancer and BPH admitted on the evening of 10/29 via direct admit from his Dr. WRIGHT, his oncologist. He complains of mild upper abdominal pain and severe hunger. His pain is worsened with food. He states his nausea and vomiting is well controlled with medication. He is currently NPO for his J-tube placement is scheduled for this afternoon. He denies chest pain, SOB, fever, dysuria, or constipation.     OBJECTIVE:  Temp:  [36.6 °C (97.9 °F)-36.9 °C (98.5 °F)] 36.9 °C (98.4 °F)  Pulse:  [60-92] 60  Resp:  [16-18] 17  BP: (118-140)/(78-95) 133/91  SpO2:  [96 %-99 %] 99 %    Intake/Output Summary (Last 24 hours) at 10/31/2019 1450  Last data filed at 10/31/2019 1300  Gross per 24 hour   Intake 2240 ml   Output --   Net 2240 ml       PE:  Gen: Appears thin, resting comfortably, NAD.   HEENT: NCAT   Pulm: CTAB, no respiratory distress.   Cardio: RRR, no M/R/G  Abdom: Soft, tenderness in the left upper quadrant. Bowel sounds present.  Psych: He has normal mood and affect. Feels anxious with \"everything going on and being in the hospital\"    LABS:  Recent Labs     10/30/19  0509 10/31/19  0414   WBC 9.4 8.8   RBC 3.95* 3.36*   HEMOGLOBIN 11.5* 9.7*   HEMATOCRIT 35.9* 30.1*   MCV 90.9 89.6   MCH 29.1 28.9   RDW 64.8* 63.5*   PLATELETCT 275 291   MPV 11.3 9.6   NEUTSPOLYS 61.20 64.50   LYMPHOCYTES 30.40 26.80   MONOCYTES 7.40 7.50   EOSINOPHILS 0.30 0.20   BASOPHILS 0.30 0.70     Recent Labs     10/30/19  0509 10/31/19  0414   SODIUM 137  --    POTASSIUM 3.9  --    CHLORIDE 104  --    CO2 26  --    BUN 14  --    CREATININE 0.75  --    CALCIUM 8.5  --    MAGNESIUM 2.0 1.9 "   PHOSPHORUS 3.3 3.6   ALBUMIN 3.6  --      Estimated GFR/CRCL = Estimated Creatinine Clearance: 92.9 mL/min (by C-G formula based on SCr of 0.75 mg/dL).  Recent Labs     10/30/19  0509   GLUCOSE 88     Recent Labs     10/30/19  0509   ASTSGOT 15   ALTSGPT 13   TBILIRUBIN 0.4   ALKPHOSPHAT 63   GLOBULIN 2.9   INR 1.02             Recent Labs     10/30/19  0509   INR 1.02      IMAGING:  CT-CHEST,ABDOMEN,PELVIS WITH    (Results Pending)   CT-ABDOMEN-PELVIS WITH    (Results Pending)       MEDS:  Current Facility-Administered Medications   Medication Last Dose   • zolpidem (AMBIEN) tablet 5-10 mg     • LORazepam (ATIVAN) tablet 0.5 mg     • Pharmacy Consult: Enteral tube insertion - review meds/change route/product selection     • senna-docusate (PERICOLACE or SENOKOT S) 8.6-50 MG per tablet 2 Tab 2 Tab at 10/31/19 0414    And   • polyethylene glycol/lytes (MIRALAX) PACKET 1 Packet      And   • magnesium hydroxide (MILK OF MAGNESIA) suspension 30 mL      And   • bisacodyl (DULCOLAX) suppository 10 mg     • acetaminophen (TYLENOL) tablet 650 mg 650 mg at 10/31/19 0352   • ondansetron (ZOFRAN ODT) dispertab 8 mg     • prochlorperazine (COMPAZINE) tablet 10 mg 10 mg at 10/31/19 0351   • promethazine (PHENERGAN) tablet 12.5 mg 12.5 mg at 10/30/19 1722   • tamsulosin (FLOMAX) capsule 0.4 mg 0.4 mg at 10/31/19 0843   • morphine ER (MS CONTIN) tablet 15 mg 15 mg at 10/31/19 0414   • omeprazole (PRILOSEC) capsule 40 mg 40 mg at 10/31/19 0414   • oxyCODONE immediate-release (ROXICODONE) tablet 7.5 mg 7.5 mg at 10/31/19 0352   • NS infusion     • morphine (pf) 4 MG/ML injection 2 mg     • prochlorperazine (COMPAZINE) injection 10 mg         ASSESSMENT/PLAN: 49 y.o. Male with PMH of stomach cancer and BPH who was a direct admit for dehydration and J-tube placement. He complains of upper abdominal pain that is worsened with oral intake, his nausea and vomiting are well controled with medication and he has been receiving IV fluids.      #Nausea/emesis  #Gastric Cancer; malignant neoplasm of fundus   -J-tube placement with with surgery today  -follow nutrition's feeding recommendations  -NPO for procedure  -Continue  Morphine IR 15 mg PO BiD  Morphine 2 mg IV Q 6hr PRN  Oxy 7.5 mg PO q3hr PRN  Acetaminophen PRN    #Anxiety and insomnia   -Lorazepam 0.5mg Q 4hr PO PRN  -Zolpidem 5-10 mg Nightly PO PRN      # Enlarged prostate  - Continue:  Flomax 0.4 mg PO daily    #Core Measures   VTE PPx: Hold for procedure today  Lines/Tubes: Periferal IV, port  Fluids:NS at 100 mL/hr  Diet: NPO  Code Status: Full code      Ashtyn Khan   Physician Assistant Student

## 2019-10-31 NOTE — ANESTHESIA PREPROCEDURE EVALUATION
Relevant Problems   ANESTHESIA   (+) Delayed emergence from anesthesia      CARDIAC   (+) HTN (hypertension)       Physical Exam    Airway   Mallampati: II  TM distance: >3 FB  Neck ROM: full       Cardiovascular - normal exam  Rhythm: regular  Rate: normal     Dental - normal exam         Pulmonary - normal exam  Breath sounds clear to auscultation     Abdominal    Neurological - normal exam                 Anesthesia Plan    ASA 3   ASA physical status 3 criteria: other (comment)    Plan - general       Airway plan will be ETT        Induction: intravenous    Postoperative Plan: Postoperative administration of opioids is intended.    Pertinent diagnostic labs and testing reviewed    Informed Consent:    Anesthetic plan and risks discussed with patient.    Use of blood products discussed with: patient whom consented to blood products.

## 2019-10-31 NOTE — PROGRESS NOTES
Surgery  Seen at the request of Dr. WRIGHT for feeding tube placement  Plan for placement today, robot assisted.  Patient counseled as to the risks versus the benefits of surgery and agreed to proceed fully informed.  Remains NPO.

## 2019-10-31 NOTE — PROGRESS NOTES
This Rn confiscated a bottle of oxycodone from the patient, 2 lighters,and a vaporizer pen. 2 lighters and vaporizer pen placed in patient's drawer. Charge RN notified and counted the pills with this RN. There are 8.5 pills in the container. Oxycodone placed in pharamacy bag and pharmacy called to  the bag, tag in patient's chart. Both this RN and Charge Rn Kory has signed the pharmacy bag.

## 2019-10-31 NOTE — PROGRESS NOTES
Bedside report received.  Assessment complete.  A&O x 4. Patient calls appropriately.  Patient up self and steady.   Patient has 2/10 pain. Declines pain medication at this time.  Denies N&V. NPO.  Small scabs throughout body.  + void, + flatus, 10/30 BM.  Patient denies SOB.  Review plan with of care with patient. Call light and personal belongings with in reach. Hourly rounding in place. All needs met at this time.

## 2019-11-01 ENCOUNTER — APPOINTMENT (OUTPATIENT)
Dept: RADIOLOGY | Facility: MEDICAL CENTER | Age: 49
DRG: 374 | End: 2019-11-01
Attending: SURGERY
Payer: MEDICARE

## 2019-11-01 LAB — PATHOLOGY CONSULT NOTE: NORMAL

## 2019-11-01 PROCEDURE — 770001 HCHG ROOM/CARE - MED/SURG/GYN PRIV*

## 2019-11-01 PROCEDURE — 700102 HCHG RX REV CODE 250 W/ 637 OVERRIDE(OP): Performed by: INTERNAL MEDICINE

## 2019-11-01 PROCEDURE — 49465 FLUORO EXAM OF G/COLON TUBE: CPT

## 2019-11-01 PROCEDURE — 302136 NUTRITION PUMP: Performed by: FAMILY MEDICINE

## 2019-11-01 PROCEDURE — 700111 HCHG RX REV CODE 636 W/ 250 OVERRIDE (IP): Performed by: STUDENT IN AN ORGANIZED HEALTH CARE EDUCATION/TRAINING PROGRAM

## 2019-11-01 PROCEDURE — 302131 K PAD MOTOR: Performed by: FAMILY MEDICINE

## 2019-11-01 PROCEDURE — 302151 K-PAD 14X20: Performed by: FAMILY MEDICINE

## 2019-11-01 PROCEDURE — 700117 HCHG RX CONTRAST REV CODE 255: Performed by: SURGERY

## 2019-11-01 PROCEDURE — 700111 HCHG RX REV CODE 636 W/ 250 OVERRIDE (IP): Performed by: INTERNAL MEDICINE

## 2019-11-01 PROCEDURE — 71260 CT THORAX DX C+: CPT

## 2019-11-01 PROCEDURE — A9270 NON-COVERED ITEM OR SERVICE: HCPCS | Performed by: INTERNAL MEDICINE

## 2019-11-01 PROCEDURE — 700105 HCHG RX REV CODE 258: Performed by: INTERNAL MEDICINE

## 2019-11-01 RX ORDER — HEPARIN SODIUM 5000 [USP'U]/ML
5000 INJECTION, SOLUTION INTRAVENOUS; SUBCUTANEOUS EVERY 8 HOURS
Status: DISCONTINUED | OUTPATIENT
Start: 2019-11-01 | End: 2019-11-09 | Stop reason: HOSPADM

## 2019-11-01 RX ADMIN — PROMETHAZINE HYDROCHLORIDE 12.5 MG: 25 TABLET ORAL at 04:27

## 2019-11-01 RX ADMIN — MORPHINE SULFATE 15 MG: 15 TABLET, EXTENDED RELEASE ORAL at 05:44

## 2019-11-01 RX ADMIN — MORPHINE SULFATE 2 MG: 4 INJECTION INTRAVENOUS at 09:25

## 2019-11-01 RX ADMIN — IOHEXOL 30 ML: 300 INJECTION, SOLUTION INTRAVENOUS at 09:30

## 2019-11-01 RX ADMIN — SODIUM CHLORIDE: 9 INJECTION, SOLUTION INTRAVENOUS at 07:36

## 2019-11-01 RX ADMIN — IOHEXOL 25 ML: 240 INJECTION, SOLUTION INTRATHECAL; INTRAVASCULAR; INTRAVENOUS; ORAL at 18:08

## 2019-11-01 RX ADMIN — OXYCODONE HYDROCHLORIDE 7.5 MG: 5 TABLET ORAL at 23:50

## 2019-11-01 RX ADMIN — HEPARIN SODIUM 5000 UNITS: 5000 INJECTION, SOLUTION INTRAVENOUS; SUBCUTANEOUS at 15:00

## 2019-11-01 RX ADMIN — SENNOSIDES AND DOCUSATE SODIUM 2 TABLET: 8.6; 5 TABLET ORAL at 05:44

## 2019-11-01 RX ADMIN — HEPARIN SODIUM 5000 UNITS: 5000 INJECTION, SOLUTION INTRAVENOUS; SUBCUTANEOUS at 20:48

## 2019-11-01 RX ADMIN — MORPHINE SULFATE 2 MG: 4 INJECTION INTRAVENOUS at 18:19

## 2019-11-01 RX ADMIN — OXYCODONE HYDROCHLORIDE 7.5 MG: 5 TABLET ORAL at 11:04

## 2019-11-01 RX ADMIN — OMEPRAZOLE 40 MG: 20 CAPSULE, DELAYED RELEASE ORAL at 05:44

## 2019-11-01 RX ADMIN — SODIUM CHLORIDE: 9 INJECTION, SOLUTION INTRAVENOUS at 16:50

## 2019-11-01 RX ADMIN — OXYCODONE HYDROCHLORIDE 7.5 MG: 5 TABLET ORAL at 13:52

## 2019-11-01 RX ADMIN — IOHEXOL 100 ML: 350 INJECTION, SOLUTION INTRAVENOUS at 18:08

## 2019-11-01 RX ADMIN — SENNOSIDES AND DOCUSATE SODIUM 2 TABLET: 8.6; 5 TABLET ORAL at 16:45

## 2019-11-01 RX ADMIN — OXYCODONE HYDROCHLORIDE 7.5 MG: 5 TABLET ORAL at 07:52

## 2019-11-01 RX ADMIN — TAMSULOSIN HYDROCHLORIDE 0.4 MG: 0.4 CAPSULE ORAL at 07:52

## 2019-11-01 RX ADMIN — MORPHINE SULFATE 15 MG: 15 TABLET, EXTENDED RELEASE ORAL at 16:45

## 2019-11-01 RX ADMIN — OXYCODONE HYDROCHLORIDE 7.5 MG: 5 TABLET ORAL at 20:48

## 2019-11-01 RX ADMIN — OXYCODONE HYDROCHLORIDE 7.5 MG: 5 TABLET ORAL at 04:22

## 2019-11-01 RX ADMIN — OXYCODONE HYDROCHLORIDE 7.5 MG: 5 TABLET ORAL at 17:45

## 2019-11-01 NOTE — OP REPORT
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    Anesthesiologist/Type of Anesthesia:  Anesthesiologist: Jonas Kramer M.D.; Hari Dupree M.D./General    Surgical Staff:  Circulator: Tabatha Fairchild R.N.  Relief Circulator: Portia Petit R.N.  Scrub Person: donta Mccall    Specimens removed if any:  Peritoneal biopsy    Estimated Blood Loss: 10 cc    Findings: Carcinomatosis    Complications: No complications were noted    Indications: Patient is a 49-year-old male who presented with obstruction from a gastric cancer.  He was unable to tolerate a diet.  I was asked to place a feeding jejunostomy.  Patient and his family were counseled extensively as to the risk versus the benefits of surgery and agreed to proceed fully informed.    Description:    The patient was prepped and draped in the standard sterile surgical fashion after induction of general anesthesia.  Appropriate timeout was performed and antibiotics delivered.  I began with a varus insertion lateral to the umbilicus.  A camera trocar was applied and the abdomen inspected.  Unfortunately the patient had carcinomatosis.  After a discussion with his surgical oncologist we decided to proceed with a palliative feeding tube and biopsy of the peritoneal lesions.    I used a bowel friendly grasper to hold the intestine approximately 15 cm from the ligament of Treitz to the anterior abdominal wall in the left upper quadrant.  I began by creating a back row of interrupted 3-0 Vicryl sutures.  I then used the Seldinger technique to insert a wire, followed by the dilators, followed by an 18 [place] citizen feeding tube.    I then used interrupted  sutures to create a Witzel tunnel.  I used interrupted 3-0 Vicryls to secure the intestine to the anterior abdominal wall.  I flushed the tube without incident.    Once that was accomplished I sharply biopsied to peritoneal lesions.  These were passed off the field as a specimen.    He did have significant intra-abdominal ascites.  I placed a 10 Filipino flat fluted PINO drain to help control the ascites while he healed his incisions.    The patient will be transferred back to the floor and a tube study will be ordered for the morning.        10/31/2019 7:18 PM Dyllan Cisneros M.D.

## 2019-11-01 NOTE — CARE PLAN
Problem: Nutritional:  Goal: Nutrition support tolerated and meeting greater than 85% of estimated needs  Outcome: PROGRESSING AS EXPECTED   TF initiated @ 10 mL/hr

## 2019-11-01 NOTE — ANESTHESIA TIME REPORT
Anesthesia Start and Stop Event Times     Date Time Event    10/31/2019 1550 Ready for Procedure     1742 Anesthesia Start     1935 Anesthesia Stop        Responsible Staff  10/31/19    Name Role Begin End    Jonas Kramer M.D. Anesth 1742 1841    Hari Dupree M.D. Anesth 1841 1935        Preop Diagnosis (Free Text):  Pre-op Diagnosis     Gastric Cancer        Preop Diagnosis (Codes):    Post op Diagnosis  Gastric cancer (HCC)      Premium Reason  B. 1st Call    Comments:

## 2019-11-01 NOTE — ANESTHESIA PROCEDURE NOTES
Airway  Date/Time: 10/31/2019 5:48 PM  Performed by: Jonas Kramer M.D.  Authorized by: Jonas Kramer M.D.     Location:  OR  Urgency:  Elective  Indications for Airway Management:  Anesthesia  Spontaneous Ventilation: absent    Sedation Level:  Deep  Preoxygenated: Yes    Patient Position:  Sniffing  Final Airway Type:  Endotracheal airway  Final Endotracheal Airway:  ETT  Cuffed: Yes    Technique Used for Successful ETT Placement:  Direct laryngoscopy  Devices/Methods Used in Placement:  Intubating stylet and cricoid pressure  Insertion Site:  Oral  Blade Type:  Sage  Laryngoscope Blade/Videolaryngoscope Blade Size:  2  ETT Size (mm):  8.0  Measured from:  Lips  ETT to Lips (cm):  24  Placement Verified by: auscultation and capnometry    Cormack-Lehane Classification:  Grade I - full view of glottis  Number of Attempts at Approach:  1

## 2019-11-01 NOTE — PROGRESS NOTES
Surgery  POD#1  Doing well.  Despondent after hearing news of carcinomatosis.   Biopsy results pending  Tube study ok  Drain copious and serous  Ok to start tube feeds per surgery  Call with questions  Continue drain until wounds healed

## 2019-11-01 NOTE — CARE PLAN
Problem: Safety  Goal: Will remain free from falls  Outcome: PROGRESSING AS EXPECTED  Intervention: Implement fall precautions  Flowsheets  Taken 10/31/2019 2359  Bed Alarm: Alarm Not On  Taken 10/31/2019 2300  Environmental Precautions: Treaded Slipper Socks on Patient;Personal Belongings, Wastebasket, Call Bell etc. in Easy Reach;Transferred to Stronger Side;Bed in Low Position  Note:   Patient is a low fall risk. Patient educated to call for assistance. Call light left within reach of patient.       Problem: Infection  Goal: Will remain free from infection  Outcome: PROGRESSING AS EXPECTED  Intervention: Implement standard precautions and perform hand washing before and after patient contact  Note:   Hand hygiene performed prior to and after entering pt room. Gloves worn during patient interactions.

## 2019-11-01 NOTE — DISCHARGE PLANNING
Anticipated Discharge Disposition: Home with enteral tube feeds.    Action: J tube placed on 10/31/19; awaiting tube study      Barriers to Discharge: Medical clearance pending results of jejunostomy tube study & DME (tube feed) set up orders.      Plan: Await medical clearance and orders for home tube feeds. MD and RN kindly notify RN CM when orders have been entered. RN CM will follow and provide assist with discharge planning needs.

## 2019-11-01 NOTE — PROGRESS NOTES
Assumed care of pt.  AAOx4.  Rating pain at 6/10, medicated per MAR.  J tube in place on LLQ, clamped.  PINO drain LLQ with serous output, dressing in place around site.  x3 lap sites dermabonded GERMANIA.  Full liquid diet in place, no c/o n/v.  LBM 10/30, + flatus, + void.  Pt ambulating up self with steady gait.  POC reviewed with pt.  Call light within reach, pt educated to call for assistance as needed.  Hourly rounding in place.

## 2019-11-01 NOTE — PROGRESS NOTES
[facility] MEDICINE PROGRESS NOTE     Attending: Eve Bales M.D.  Senior Resident: Jonas Vallejo M.D. (PGY-3)  Jose Luis Resident: Niko Whittington M.D. (PGY-1)  PATIENT: Tima Rizzo; [id number]; 1970    ID: 49 y.o. male admitted for J-tube placement 2/2 gastric cancer. POD#1 J-tube placement.    Overnight Events: No acute events overnight events.    Subjective: Patient tolerating small amount of PO. Pain is controlled. Passing gas, no BM yet. Patient is excited to start tube feeds. Feel cold, but denies fevers, sweats, rigors, chest pain, shortness of breath, or dysuria. POC discussed.    Patient later informed of surgical findings. Spoke with oncologist Dr. Roger, who plans to see patient tomorrow to discuss further.    OBJECTIVE:  Temp:  [36.1 °C (97 °F)-37 °C (98.6 °F)] 36.7 °C (98.1 °F)  Pulse:  [60-85] 60  Resp:  [14-23] 17  BP: (113-152)/() 144/85  SpO2:  [95 %-100 %] 96 %    Intake/Output Summary (Last 24 hours) at 11/1/2019 0615  Last data filed at 11/1/2019 0500  Gross per 24 hour   Intake 3170 ml   Output 970 ml   Net 2200 ml       PE:  Gen: No Acute Distress   HEENT: NCAT   Pulm: Faint rhonchi present.  Cardio: NS1S2 NMRG   Abdom: Port sites clean dry and intact. PINO drain in place draining yellow fluid. J-tube with clean dry bandage in place. BS+. Appropriate post op tenderness with no rebound or guarding.  Ext: No edema, 2+ pulses     LABS:  Recent Labs     10/30/19  0509 10/31/19  0414   WBC 9.4 8.8   RBC 3.95* 3.36*   HEMOGLOBIN 11.5* 9.7*   HEMATOCRIT 35.9* 30.1*   MCV 90.9 89.6   MCH 29.1 28.9   RDW 64.8* 63.5*   PLATELETCT 275 291   MPV 11.3 9.6   NEUTSPOLYS 61.20 64.50   LYMPHOCYTES 30.40 26.80   MONOCYTES 7.40 7.50   EOSINOPHILS 0.30 0.20   BASOPHILS 0.30 0.70     Recent Labs     10/30/19  0509 10/31/19  0414   SODIUM 137  --    POTASSIUM 3.9  --    CHLORIDE 104  --    CO2 26  --    BUN 14  --    CREATININE 0.75  --    CALCIUM 8.5  --    MAGNESIUM 2.0 1.9   PHOSPHORUS 3.3 3.6    ALBUMIN 3.6  --      Estimated GFR/CRCL = Estimated Creatinine Clearance: 92.9 mL/min (by C-G formula based on SCr of 0.75 mg/dL).  Recent Labs     10/30/19  0509   GLUCOSE 88     Recent Labs     10/30/19  0509   ASTSGOT 15   ALTSGPT 13   TBILIRUBIN 0.4   ALKPHOSPHAT 63   GLOBULIN 2.9   INR 1.02             Recent Labs     10/30/19  0509   INR 1.02       MICROBIOLOGY:   No results found for: BLOODCULTU, BLDCULT, BCHOLD     IMAGING:   CT-CHEST,ABDOMEN,PELVIS WITH    (Results Pending)   CT-ABDOMEN-PELVIS WITH    (Results Pending)       MEDS:  Current Facility-Administered Medications   Medication Last Dose   • zolpidem (AMBIEN) tablet 5-10 mg     • LORazepam (ATIVAN) tablet 0.5 mg     • Pharmacy Consult: Enteral tube insertion - review meds/change route/product selection     • senna-docusate (PERICOLACE or SENOKOT S) 8.6-50 MG per tablet 2 Tab 2 Tab at 11/01/19 0544    And   • polyethylene glycol/lytes (MIRALAX) PACKET 1 Packet      And   • magnesium hydroxide (MILK OF MAGNESIA) suspension 30 mL      And   • bisacodyl (DULCOLAX) suppository 10 mg     • acetaminophen (TYLENOL) tablet 650 mg 650 mg at 10/31/19 0352   • ondansetron (ZOFRAN ODT) dispertab 8 mg     • prochlorperazine (COMPAZINE) tablet 10 mg 10 mg at 10/31/19 0351   • promethazine (PHENERGAN) tablet 12.5 mg 12.5 mg at 11/01/19 0427   • tamsulosin (FLOMAX) capsule 0.4 mg 0.4 mg at 10/31/19 0843   • morphine ER (MS CONTIN) tablet 15 mg 15 mg at 11/01/19 0544   • omeprazole (PRILOSEC) capsule 40 mg 40 mg at 11/01/19 0544   • oxyCODONE immediate-release (ROXICODONE) tablet 7.5 mg 7.5 mg at 11/01/19 0422   • NS infusion     • morphine (pf) 4 MG/ML injection 2 mg     • prochlorperazine (COMPAZINE) injection 10 mg           ASSESSMENT/PLAN:  49 y.o. male who was a direct admit for J-tube placement 2/2 to nausea, emesis, and pain all exacerbated by PO intake 2/2 gastric cancer.     # Nausea/emesis  # Gastric cancer with carcinomatosis  # J-tube placement,  POD#1  - PINO draining serous fluid with copious output.   - Leak test followed by CT chest, abdomen pelvis, with IV and oral contrast.  - Biopsy pending.  - Will follow-up oncology recommendations  - Continue:  - Morphine IR 15 mg PO BiD  - Morphine 2 mg IV Q 6hr PRN  - Oxy 7.5 mg PO q3hr PRN     #BPH  - Continue:  Flomax 0.4 mg PO daily     #Insomnia   -Zolpedem 5-10mg QHS (hold for sedation)     # Iron deficiency anemia  - Will consider supplementation after feeds reach goal.      #Core Measures   VTE PPx: Resume heparin  Abx:N/A  Lines/Tubes:perfieral IV  Fluids:NS @ 100 mL/hr  Diet: NPO until after leak test  Code Status: Full code     Niko Whittington M.D.   PGY-1  [facility]   [phone]

## 2019-11-01 NOTE — ANESTHESIA QCDR
2019 [facility] Clinical Data Registry (for Quality Improvement)     Postoperative nausea/vomiting risk protocol (Adult = 18 yrs and Pediatric 3-17 yrs)- (430 and 463)  General inhalation anesthetic (NOT TIVA) with PONV risk factors: No  Provision of anti-emetic therapy with at least 2 different classes of agents: N/A  Patient DID NOT receive anti-emetic therapy and reason is documented in Medical Record: N/A    Multimodal Pain Management- (AQI59)  Patient undergoing Elective Surgery (i.e. Outpatient, or ASC, or Prescheduled Surgery prior to Hospital Admission): No  Use of Multimodal Pain Management, two or more drugs and/or interventions, NOT including systemic opioids: N/A  Exception: Documented allergy to multiple classes of analgesics: N/A    PACU assessment of acute postoperative pain prior to Anesthesia Care End- Applies to Patients Age = 18- (ABG7)  Initial PACU pain score is which of the following: < 7/10  Patient unable to report pain score: N/A    Post-anesthetic transfer of care checklist/protocol to PACU/ICU- (426 and 427)  Upon conclusion of case, patient transferred to which of the following locations: PACU/Non-ICU  Use of transfer checklist/protocol: Yes  Exclusion: Service Performed in Patient Hospital Room (and thus did not require transfer): N/A    PACU Reintubation- (AQI31)  General anesthesia requiring endotracheal intubation (ETT) along with subsequent extubation in OR or PACU: Yes  Required reintubation in the PACU: No   Extubation was a planned trial documented in the medical record prior to removal of the original airway device:  N/A    Unplanned admission to ICU related to anesthesia service up through end of PACU care- (MD51)  Unplanned admission to ICU (not initially anticipated at anesthesia start time): No

## 2019-11-01 NOTE — OR NURSING
Vss. A&ox4. 3 lap stabs and J tube and PINO drain from 4 surgical site. Pt medicated for pain and nausea. Pt states pain is tolerable for transfer to room. Pt managed a few sips of water. No episodes of vomiting. Family was supportive at bedside.

## 2019-11-01 NOTE — ANESTHESIA POSTPROCEDURE EVALUATION
Patient: Tima Rizzo    Procedure Summary     Date:  10/31/19 Room / Location:  [facility] 11 / SURGERY [facility]    Anesthesia Start:  1742 Anesthesia Stop:  1935    Procedures:       LAPAROSCOPY, DIAGNOSTIC, ROBOT-ASSISTED, USING DA KRISTINE XI (Abdomen)      CREATION, JEJUNOSTOMY (Abdomen) Diagnosis:  (Gastric Cancer)    Surgeon:  Dyllan Cisneros M.D. Responsible Provider:  Hari Dupree M.D.    Anesthesia Type:  general ASA Status:  3          Final Anesthesia Type: general  Last vitals  BP   Blood Pressure: 131/74    Temp   36.1 °C (97 °F)    Pulse   Pulse: 61, Heart Rate (Monitored): 90   Resp   17    SpO2   95 %      Anesthesia Post Evaluation    Patient location during evaluation: PACU  Patient participation: complete - patient participated  Level of consciousness: awake and alert  Pain score: 6    Airway patency: patent  Anesthetic complications: no  Cardiovascular status: hemodynamically stable  Respiratory status: acceptable  Hydration status: euvolemic    PONV: none           Nurse Pain Score: 6 (NPRS)

## 2019-11-02 PROCEDURE — 700111 HCHG RX REV CODE 636 W/ 250 OVERRIDE (IP): Performed by: STUDENT IN AN ORGANIZED HEALTH CARE EDUCATION/TRAINING PROGRAM

## 2019-11-02 PROCEDURE — 700111 HCHG RX REV CODE 636 W/ 250 OVERRIDE (IP): Performed by: INTERNAL MEDICINE

## 2019-11-02 PROCEDURE — 700102 HCHG RX REV CODE 250 W/ 637 OVERRIDE(OP): Performed by: INTERNAL MEDICINE

## 2019-11-02 PROCEDURE — A9270 NON-COVERED ITEM OR SERVICE: HCPCS | Performed by: INTERNAL MEDICINE

## 2019-11-02 PROCEDURE — 770001 HCHG ROOM/CARE - MED/SURG/GYN PRIV*

## 2019-11-02 PROCEDURE — 700105 HCHG RX REV CODE 258: Performed by: INTERNAL MEDICINE

## 2019-11-02 RX ORDER — SODIUM CHLORIDE 9 MG/ML
INJECTION, SOLUTION INTRAVENOUS
Status: ACTIVE
Start: 2019-11-02 | End: 2019-11-02

## 2019-11-02 RX ORDER — OXYCODONE HYDROCHLORIDE 10 MG/1
10 TABLET ORAL
Status: DISCONTINUED | OUTPATIENT
Start: 2019-11-02 | End: 2019-11-03

## 2019-11-02 RX ADMIN — OXYCODONE HYDROCHLORIDE 10 MG: 10 TABLET ORAL at 19:20

## 2019-11-02 RX ADMIN — ACETAMINOPHEN 650 MG: 325 TABLET, FILM COATED ORAL at 04:05

## 2019-11-02 RX ADMIN — SENNOSIDES AND DOCUSATE SODIUM 2 TABLET: 8.6; 5 TABLET ORAL at 17:55

## 2019-11-02 RX ADMIN — PROMETHAZINE HYDROCHLORIDE 12.5 MG: 25 TABLET ORAL at 04:00

## 2019-11-02 RX ADMIN — SENNOSIDES AND DOCUSATE SODIUM 2 TABLET: 8.6; 5 TABLET ORAL at 05:07

## 2019-11-02 RX ADMIN — OXYCODONE HYDROCHLORIDE 7.5 MG: 5 TABLET ORAL at 06:36

## 2019-11-02 RX ADMIN — SODIUM CHLORIDE: 9 INJECTION, SOLUTION INTRAVENOUS at 03:54

## 2019-11-02 RX ADMIN — OXYCODONE HYDROCHLORIDE 7.5 MG: 5 TABLET ORAL at 03:00

## 2019-11-02 RX ADMIN — OXYCODONE HYDROCHLORIDE 7.5 MG: 5 TABLET ORAL at 10:41

## 2019-11-02 RX ADMIN — OMEPRAZOLE 40 MG: 20 CAPSULE, DELAYED RELEASE ORAL at 05:07

## 2019-11-02 RX ADMIN — TAMSULOSIN HYDROCHLORIDE 0.4 MG: 0.4 CAPSULE ORAL at 08:06

## 2019-11-02 RX ADMIN — OXYCODONE HYDROCHLORIDE 7.5 MG: 5 TABLET ORAL at 13:48

## 2019-11-02 RX ADMIN — HEPARIN SODIUM 5000 UNITS: 5000 INJECTION, SOLUTION INTRAVENOUS; SUBCUTANEOUS at 13:49

## 2019-11-02 RX ADMIN — MORPHINE SULFATE 15 MG: 15 TABLET, EXTENDED RELEASE ORAL at 17:55

## 2019-11-02 RX ADMIN — MORPHINE SULFATE 2 MG: 4 INJECTION INTRAVENOUS at 03:54

## 2019-11-02 RX ADMIN — PROMETHAZINE HYDROCHLORIDE 12.5 MG: 25 TABLET ORAL at 19:25

## 2019-11-02 RX ADMIN — MORPHINE SULFATE 15 MG: 15 TABLET, EXTENDED RELEASE ORAL at 05:07

## 2019-11-02 RX ADMIN — POLYETHYLENE GLYCOL 3350 1 PACKET: 17 POWDER, FOR SOLUTION ORAL at 17:54

## 2019-11-02 RX ADMIN — HEPARIN SODIUM 5000 UNITS: 5000 INJECTION, SOLUTION INTRAVENOUS; SUBCUTANEOUS at 05:07

## 2019-11-02 RX ADMIN — ONDANSETRON 8 MG: 4 TABLET, ORALLY DISINTEGRATING ORAL at 17:53

## 2019-11-02 RX ADMIN — SODIUM CHLORIDE: 9 INJECTION, SOLUTION INTRAVENOUS at 13:50

## 2019-11-02 RX ADMIN — OXYCODONE HYDROCHLORIDE 10 MG: 10 TABLET ORAL at 22:19

## 2019-11-02 NOTE — PROGRESS NOTES
Surgery    Cramping with tube feeds overnight  Tolerating oral intake, in slow, intermittent amounts    No flatus or BM since surgery    Abdomen soft with minimal distention  PINO clear serous    Assessment and plan:  Metastatic cancer status post J-tube for supplemental feeding    Probably okay to advance diet as tolerated to regular/postgastrectomy with more smaller meals per day  Try to resume tube feeding at night at lower goal rate    Leave PINO

## 2019-11-02 NOTE — CARE PLAN
Problem: Psychosocial Needs:  Goal: Level of anxiety will decrease  Outcome: PROGRESSING AS EXPECTED   MD discussed results and POC with patient and family, all questions answered.     Problem: Pain Management  Goal: Pain level will decrease to patient's comfort goal  Outcome: PROGRESSING SLOWER THAN EXPECTED  Pt states pain uncontrolled, PRN oxycodone dose increased to 10mg Q3, heat applied between.

## 2019-11-02 NOTE — PROGRESS NOTES
A&Ox4.   HTN this morning, [facility] paged for PRN medication.   Pt states pain 4/10, previously medicated.   J tube LLQ, dressing in place, CDI. Flushed 30ml.   PINO drain LLQ, yellow thick output note, 950ml output overnight. .  Lap sites, GERMANIA, CDI.  Tolerating fulls diet; nocturnal tube feed Fibersource goal of 135ml. Pt not tolerating last night administration that was advanced to 35ml/hr.   Denies n/v this morning. Normoactive bowel sounds, - flatus, LBM 10/30.  Saturating >90% on RA.  Pt ambulates self, no fall risk.  PIV running NS at 100ml/hr.    Updated on plan of care. Safety education provided. Bed locked in low. Call light within reach. Rounding in place.

## 2019-11-02 NOTE — PROGRESS NOTES
Patient complaints of sudden extreme pain around J-tube and PINO site. RN unable to get pain under control with use of medications. Tube feed shut off as it is possible source of pain for patient.

## 2019-11-02 NOTE — PROGRESS NOTES
1200: Dr. Hudson saw patient bedside. This RN discussed tube feeds and PINO drain output. Orders to advance diet to regular. Family resident paged regarding tube feed administration and possible consultation of palliative or hospice. Pt most recent /112 no orders previous recieved, awaiting page.     1530: [facility] resident returned page, orders to restart nocturnal tube feed at 10ml/hr and advance 25ml/hr Q8 hours as tolerated until goal of 135ml/hr.  Consults discussed, no new orders, MD to discuss with patient and family before placing order.   BP addressed, this RN requested PRN antihypertisives, no new orders.

## 2019-11-02 NOTE — PROGRESS NOTES
Dr. Marie from [facility] paged. This RN updated Dr. Marie of overnight events. MD Marie notified of large PINO serous output. Dr. Marie also informed that tube feeds had been stopped early at 0400 due to patient's increased abdominal pain around site of J-tube and PINO drain. MD also notified that patient complaining of new onset spasmic pain originating around and internal to his genitals. MD Marie told this RN that he would discuss the plan of care for the patient with the dayshift team. This RN received no new orders.

## 2019-11-02 NOTE — PROGRESS NOTES
Bedside report completed.  A&O x4.  In no acute distress.   VSS. on RA.  IVF NS 100mL/hr  Tube feed running though [employer] fibersource HN 10mL/hr, to advance to 35mL/hr at 2000  Ambulating self  Tolerating full liquid diet, denies N/V.  Complaints of sharp shooting abdominal/groin pain 8/10, previously medicated by dayshift RN  PINO drain to LLQ with serous output  J-tube to LLQ  Last BM 10/30/19 per pt report, patient reports not passing any gas today  Up to bathroom to void.    Call light and personal belongings within reach. POC discussed and all questions answered. No  additional needs at this time.

## 2019-11-02 NOTE — CONSULTS
DATE OF SERVICE:  11/02/2019    Consultation was called by Dr. Dickson Simpson.    REASON FOR CONSULTATION:  Metastatic gastric adenocarcinoma.    HISTORY OF PRESENT ILLNESS:  The patient was seen by me for the management of   his T4 N3 M1 gastric adenocarcinoma involving the fundus of sick stomach,   which was originally diagnosed when he had an upper gastrointestinal endoscopy   performed by Dr. Marie on 05/09/2019, which was consistent with an   adenocarcinoma with signet features.  The patient was also seen by Dr. Simpson.  PET scan from 06/18/2019 showed FDG avid gastric wall thickness   with no evidence of distant metastasis.  EUS was done by Dr. Bjorn Claudio from   06/12/2019 showed it to be a T4bN3a lesion.  Neoadjuvant chemotherapy with   FLOT was started on 07/05/2019 and was generally well tolerated by him.  After   his fourth cycle of chemotherapy, he ended up in the hospital because of   severe diarrhea.  Four cycles of FLOT were completed on 09/19/2019 and then   fifth cycle of chemotherapy was given to him in early October.  The patient   had significant difficulties with nutrition and was followed up by Dr. Simpson.  He was taken to surgery 2 days ago and was found to have   omental metastasis.  Hem-onc consultation was called for further management of   his condition.  His mother and children are at the bedside.  The patient is   anxious.  His pain is generally well controlled.  He is being fed through his   feeding tube, which was placed recently.    PAST MEDICAL HISTORY:  Pain, hypertension, diabetes, chronic back pain,   prostatism.    PAST SURGICAL HISTORY:  Neck surgery, tonsillectomy, adenoidectomy, feeding   tube placement, EGD.    PERSONAL AND SOCIAL HISTORY:  Smoker.  No history of drug abuse.  He works as   a .  Social drinker.    REVIEW OF SYSTEMS:  GENERAL AND CONSTITUTIONAL:  Has lost some weight.  Otherwise, denies any   fevers or chills.  HEAD, NECK,  EARS, NOSE AND THROAT:  Denies any headaches or any visual   symptoms.  RESPIRATORY:  Has occasional cough.  Denies any hemoptysis.  CARDIOVASCULAR:  No chest pain or palpitations.  GASTROINTESTINAL:  Has abdominal pain and discomfort, occasional constipation.    Feeding tube has been placed.  GENITOURINARY:  Occasional urinary frequency, but no dysuria.  PSYCHIATRIC:  Anxious, but denies any depression.  NEUROLOGICAL:  Denies any significant neuropathy or any history of stroke or   weakness.  SKIN/INTEGUMENTARY:  No rash or any bruising.  Rest of review of systems is   negative per CMS/AMA criteria unless as mentioned in history of present or   past illness.    PHYSICAL EXAMINATION:  GENERAL:  He is alert and oriented x3, not in any distress.  VITAL SIGNS:  Temperature 37.2, pulse 87, respirations 16, and /107.  HEENT:  Pupils are equal.  There is no icterus.  Conjunctivae normal.  Oral   mucosa reveals no mucositis.  Oropharynx is normal.  NECK:  Supple with no JVD or lymphadenopathy.  There is no supraclavicular   adenopathy.  LUNGS:  Clear to auscultation.  HEART:  Reveals no S3, S4.  ABDOMEN:  Reveals the surgical site is healing.  There is a feeding tube in   place.  There is tenderness around that site.  No hepatomegaly.  No edema of   lower extremities.  BACK:  Reveals no kyphoscoliosis.  NEUROLOGIC:  Power is equal bilaterally in both upper and lower extremities.  PSYCHIATRIC:  Reveals normal affect, orientation, and mood.    ASSESSMENT AND PLAN:  1.  Gastric adenocarcinoma.  The patient has now developed metastatic disease.    This is very worrisome.  I discussed the path findings with the patient and   his family members in detail.  They are aware that the patient has a   noncurable malignancy and any treatment would be palliative and may extend his   life span, but would not be considered curative.  My plan is to get HER-2/lyle   testing and NexGen sequencing done on his biopsy specimen.  Further    management will depend upon the results of this test.  I spent a total of 45   minutes discussing these issues with the patient and his family members and   answering their questions about further treatment.  2.  Pain.  His pain is generally well controlled.  He still has issues with   abdominal pain on his current dose of oxycodone, which will be increased now   to 10 mg q. 3 hours.       ____________________________________     MD IDALMIS Luevano / BG    DD:  11/02/2019 14:26:32  DT:  11/02/2019 15:44:05    D#:  [id number]  Job#:  [id number]

## 2019-11-02 NOTE — PROGRESS NOTES
[facility] MEDICINE PROGRESS NOTE     Attending: Lucretia Pereyra M.D.  Senior Resident: Jonas Vallejo M.D. (PGY-3)  Jose Luis Resident: Niko Whittington M.D. (PGY-1)  PATIENT: Tima Rizzo; [id number]; 1970    ID: 49 y.o. male admitted for  J-tube placement 2/2 gastric cancer. POD#2 J-tube placemen    Overnight Events: Overnight the patient complained of abdominal pain localized around the tube insertion site and radiating to his groin.  Tube feeds were stopped with improvement in pain.    Subjective: Patient resting comfortably in bed.  He reports that the pain he experienced last night has almost completely resolved.  Patient would like to resume tube feeds at this time.  He states that his nausea is well controlled with his current medications.  He denies any episodes of emesis.  Patient is ambulating to the restroom without difficulty.  Patient has not had a bowel movement since arriving to the hospital.  Discussed that he has PRN medication for constipation.  With suppository recommended.  Otherwise patient feels well.  Denies any fevers chills, sweats, headache, dizziness, chest pain, or dysuria.    OBJECTIVE:  Temp:  [36.9 °C (98.4 °F)-37.3 °C (99.1 °F)] 37.1 °C (98.8 °F)  Pulse:  [65-98] 65  Resp:  [16-18] 16  BP: (126-159)/() 147/99  SpO2:  [97 %-98 %] 97 %    Intake/Output Summary (Last 24 hours) at 11/2/2019 0656  Last data filed at 11/2/2019 0520  Gross per 24 hour   Intake 5130 ml   Output 1850 ml   Net 3280 ml       PE:  Gen: No Acute Distress   HEENT: NCAT   Pulm: CTABL, no respiratory distress   Cardio: NS1S2 NMRG   Abdom: Port sites clean dry and intact.  No erythema or discharge.  PINO drain in place draining yellow fluid.  No signs of infection at J-tube insertion site.  Appropriate postop tenderness.  No rebound or guarding.  Ext: No edema, 2+ pulses     LABS:  Recent Labs     10/31/19  0414   WBC 8.8   RBC 3.36*   HEMOGLOBIN 9.7*   HEMATOCRIT 30.1*   MCV 89.6   MCH 28.9   RDW 63.5*   PLATELETCT  291   MPV 9.6   NEUTSPOLYS 64.50   LYMPHOCYTES 26.80   MONOCYTES 7.50   EOSINOPHILS 0.20   BASOPHILS 0.70     Recent Labs     10/31/19  0414   MAGNESIUM 1.9   PHOSPHORUS 3.6     Estimated GFR/CRCL = Estimated Creatinine Clearance: 92.9 mL/min (by C-G formula based on SCr of 0.75 mg/dL).  No results for input(s): GLUCOSE, POCGLUCOSE in the last 72 hours.              No results for input(s): INR, APTT, FIBRINOGEN in the last 72 hours.    Invalid input(s): DIMER    MICROBIOLOGY:  No results found for: BLOODCULTU, BLDCULT, BCHOLD     IMAGING:   CT-CHEST,ABDOMEN,PELVIS WITH   Final Result      No evidence of thoracic metastases.      Interval placement of J-tube.      Apparent persistent gastric mass.      New mild bilateral hydronephrosis.      DX-G.I. TUBE INJECTION, ANY TYPE   Final Result      Enterostomy tube terminates in the jejunum and no contrast extravasation is identified          MEDS:  Current Facility-Administered Medications   Medication Last Dose   • SODIUM CHLORIDE 0.9 % IV SOLN     • heparin injection 5,000 Units 5,000 Units at 11/02/19 0507   • zolpidem (AMBIEN) tablet 5-10 mg     • LORazepam (ATIVAN) tablet 0.5 mg     • Pharmacy Consult: Enteral tube insertion - review meds/change route/product selection     • senna-docusate (PERICOLACE or SENOKOT S) 8.6-50 MG per tablet 2 Tab 2 Tab at 11/02/19 0507    And   • polyethylene glycol/lytes (MIRALAX) PACKET 1 Packet      And   • magnesium hydroxide (MILK OF MAGNESIA) suspension 30 mL      And   • bisacodyl (DULCOLAX) suppository 10 mg     • acetaminophen (TYLENOL) tablet 650 mg 650 mg at 11/02/19 0405   • ondansetron (ZOFRAN ODT) dispertab 8 mg     • prochlorperazine (COMPAZINE) tablet 10 mg 10 mg at 10/31/19 0351   • promethazine (PHENERGAN) tablet 12.5 mg 12.5 mg at 11/02/19 0400   • tamsulosin (FLOMAX) capsule 0.4 mg 0.4 mg at 11/01/19 0752   • morphine ER (MS CONTIN) tablet 15 mg 15 mg at 11/02/19 0507   • omeprazole (PRILOSEC) capsule 40 mg 40 mg at  11/02/19 0507   • oxyCODONE immediate-release (ROXICODONE) tablet 7.5 mg 7.5 mg at 11/02/19 0636   • NS infusion     • morphine (pf) 4 MG/ML injection 2 mg 2 mg at 11/02/19 0354   • prochlorperazine (COMPAZINE) injection 10 mg           ASSESSMENT/PLAN: 49 y.o. male admitted for J-tube placement 2/2 to nausea, emesis, and pain all exacerbated by PO intake 2/2 gastric cancer     # Nausea/emesis  # Gastric  adenocarcinoma with carcinomatosis  # J-tube placement, POD#2  - PINO draining serous fluid with copious output.   - CT chest abdomen pelvis revealed no mets to the chest.  Redemonstrated gastric mass.  And no other masses noted.  - Biopsy pending.  Oncology plans to do her-2 testing on biopsy.  - Will continue to follow oncology recommendations for further management, prognosis, work-up, and disposition  - Continue:   Morphine IR 15 mg PO BiD  Morphine 2 mg IV Q 6hr PRN  Increase:  Oxy 10 mg PO q3hr PRN     #BPH  - Continue:  Flomax 0.4 mg PO daily     #Insomnia   -Zolpedem 5-10mg QHS (hold for sedation)     # Iron deficiency anemia  - Will consider supplementation after feeds reach goal.      #Core Measures   VTE PPx: Resume heparin  Abx:N/A  Lines/Tubes:perfieral IV  Fluids:NS @ 100 mL/hr  Diet: NPO until after leak test  Code Status: Full code     Niko Whittington M.D.   PGY-1  [facility]   [phone]

## 2019-11-02 NOTE — CARE PLAN
Problem: Safety  Goal: Will remain free from falls  Outcome: PROGRESSING AS EXPECTED  Intervention: Implement fall precautions  Flowsheets  Taken 11/1/2019 2227  Bed Alarm: Alarm Not On  Taken 11/1/2019 2010  Environmental Precautions: Treaded Slipper Socks on Patient;Personal Belongings, Wastebasket, Call Bell etc. in Easy Reach;Transferred to Stronger Side;Bed in Low Position  Note:   Patient is not deemed a fall risk. Patient educated to call for assistance when necessary. Call light left within reach of patient.       Problem: Infection  Goal: Will remain free from infection  Outcome: PROGRESSING AS EXPECTED  Intervention: Implement standard precautions and perform hand washing before and after patient contact  Note:   Hand hygiene performed prior to and after entering pt room. Gloves worn during patient interactions.

## 2019-11-03 LAB
ANION GAP SERPL CALC-SCNC: 7 MMOL/L (ref 0–11.9)
BASOPHILS # BLD AUTO: 0.3 % (ref 0–1.8)
BASOPHILS # BLD: 0.04 K/UL (ref 0–0.12)
BUN SERPL-MCNC: 6 MG/DL (ref 8–22)
CALCIUM SERPL-MCNC: 8.6 MG/DL (ref 8.5–10.5)
CHLORIDE SERPL-SCNC: 101 MMOL/L (ref 96–112)
CO2 SERPL-SCNC: 23 MMOL/L (ref 20–33)
CREAT SERPL-MCNC: 0.69 MG/DL (ref 0.5–1.4)
EOSINOPHIL # BLD AUTO: 0.06 K/UL (ref 0–0.51)
EOSINOPHIL NFR BLD: 0.5 % (ref 0–6.9)
ERYTHROCYTE [DISTWIDTH] IN BLOOD BY AUTOMATED COUNT: 63.4 FL (ref 35.9–50)
GLUCOSE SERPL-MCNC: 113 MG/DL (ref 65–99)
HCT VFR BLD AUTO: 37.1 % (ref 42–52)
HGB BLD-MCNC: 12.1 G/DL (ref 14–18)
IMM GRANULOCYTES # BLD AUTO: 0.03 K/UL (ref 0–0.11)
IMM GRANULOCYTES NFR BLD AUTO: 0.3 % (ref 0–0.9)
LYMPHOCYTES # BLD AUTO: 2.45 K/UL (ref 1–4.8)
LYMPHOCYTES NFR BLD: 21 % (ref 22–41)
MAGNESIUM SERPL-MCNC: 1.9 MG/DL (ref 1.5–2.5)
MCH RBC QN AUTO: 29.4 PG (ref 27–33)
MCHC RBC AUTO-ENTMCNC: 32.6 G/DL (ref 33.7–35.3)
MCV RBC AUTO: 90 FL (ref 81.4–97.8)
MONOCYTES # BLD AUTO: 0.86 K/UL (ref 0–0.85)
MONOCYTES NFR BLD AUTO: 7.4 % (ref 0–13.4)
NEUTROPHILS # BLD AUTO: 8.24 K/UL (ref 1.82–7.42)
NEUTROPHILS NFR BLD: 70.5 % (ref 44–72)
NRBC # BLD AUTO: 0 K/UL
NRBC BLD-RTO: 0 /100 WBC
PHOSPHATE SERPL-MCNC: 3.9 MG/DL (ref 2.5–4.5)
PLATELET # BLD AUTO: 431 K/UL (ref 164–446)
PMV BLD AUTO: 9.7 FL (ref 9–12.9)
POTASSIUM SERPL-SCNC: 4.2 MMOL/L (ref 3.6–5.5)
RBC # BLD AUTO: 4.12 M/UL (ref 4.7–6.1)
SODIUM SERPL-SCNC: 131 MMOL/L (ref 135–145)
WBC # BLD AUTO: 11.7 K/UL (ref 4.8–10.8)

## 2019-11-03 PROCEDURE — 770001 HCHG ROOM/CARE - MED/SURG/GYN PRIV*

## 2019-11-03 PROCEDURE — 83735 ASSAY OF MAGNESIUM: CPT

## 2019-11-03 PROCEDURE — 700101 HCHG RX REV CODE 250: Performed by: STUDENT IN AN ORGANIZED HEALTH CARE EDUCATION/TRAINING PROGRAM

## 2019-11-03 PROCEDURE — 700102 HCHG RX REV CODE 250 W/ 637 OVERRIDE(OP): Performed by: STUDENT IN AN ORGANIZED HEALTH CARE EDUCATION/TRAINING PROGRAM

## 2019-11-03 PROCEDURE — 700102 HCHG RX REV CODE 250 W/ 637 OVERRIDE(OP): Performed by: INTERNAL MEDICINE

## 2019-11-03 PROCEDURE — 99222 1ST HOSP IP/OBS MODERATE 55: CPT | Performed by: NURSE PRACTITIONER

## 2019-11-03 PROCEDURE — A9270 NON-COVERED ITEM OR SERVICE: HCPCS | Performed by: STUDENT IN AN ORGANIZED HEALTH CARE EDUCATION/TRAINING PROGRAM

## 2019-11-03 PROCEDURE — A9270 NON-COVERED ITEM OR SERVICE: HCPCS | Performed by: NURSE PRACTITIONER

## 2019-11-03 PROCEDURE — 80048 BASIC METABOLIC PNL TOTAL CA: CPT

## 2019-11-03 PROCEDURE — 700111 HCHG RX REV CODE 636 W/ 250 OVERRIDE (IP): Performed by: INTERNAL MEDICINE

## 2019-11-03 PROCEDURE — 84100 ASSAY OF PHOSPHORUS: CPT

## 2019-11-03 PROCEDURE — 700102 HCHG RX REV CODE 250 W/ 637 OVERRIDE(OP): Performed by: NURSE PRACTITIONER

## 2019-11-03 PROCEDURE — A9270 NON-COVERED ITEM OR SERVICE: HCPCS | Performed by: INTERNAL MEDICINE

## 2019-11-03 PROCEDURE — 700111 HCHG RX REV CODE 636 W/ 250 OVERRIDE (IP): Performed by: STUDENT IN AN ORGANIZED HEALTH CARE EDUCATION/TRAINING PROGRAM

## 2019-11-03 PROCEDURE — 85025 COMPLETE CBC W/AUTO DIFF WBC: CPT

## 2019-11-03 PROCEDURE — 36415 COLL VENOUS BLD VENIPUNCTURE: CPT

## 2019-11-03 RX ORDER — HYDROMORPHONE HYDROCHLORIDE 1 MG/ML
0.5 INJECTION, SOLUTION INTRAMUSCULAR; INTRAVENOUS; SUBCUTANEOUS
Status: DISCONTINUED | OUTPATIENT
Start: 2019-11-03 | End: 2019-11-09

## 2019-11-03 RX ORDER — OXYCODONE HCL 20 MG/1
20 TABLET, FILM COATED, EXTENDED RELEASE ORAL EVERY 12 HOURS
Status: DISCONTINUED | OUTPATIENT
Start: 2019-11-03 | End: 2019-11-09 | Stop reason: HOSPADM

## 2019-11-03 RX ORDER — LORAZEPAM 1 MG/1
0.5 TABLET ORAL
Status: DISCONTINUED | OUTPATIENT
Start: 2019-11-03 | End: 2019-11-05

## 2019-11-03 RX ORDER — OXYCODONE HYDROCHLORIDE 10 MG/1
10-15 TABLET ORAL
Status: DISCONTINUED | OUTPATIENT
Start: 2019-11-03 | End: 2019-11-06

## 2019-11-03 RX ORDER — OLANZAPINE 5 MG/1
2.5 TABLET ORAL
Status: DISCONTINUED | OUTPATIENT
Start: 2019-11-03 | End: 2019-11-08

## 2019-11-03 RX ORDER — LISINOPRIL 20 MG/1
20 TABLET ORAL
Status: DISCONTINUED | OUTPATIENT
Start: 2019-11-03 | End: 2019-11-09 | Stop reason: HOSPADM

## 2019-11-03 RX ORDER — LABETALOL HYDROCHLORIDE 5 MG/ML
10 INJECTION, SOLUTION INTRAVENOUS EVERY 4 HOURS PRN
Status: DISCONTINUED | OUTPATIENT
Start: 2019-11-03 | End: 2019-11-09 | Stop reason: HOSPADM

## 2019-11-03 RX ADMIN — OXYCODONE HYDROCHLORIDE 10 MG: 10 TABLET ORAL at 01:35

## 2019-11-03 RX ADMIN — LABETALOL HYDROCHLORIDE 10 MG: 5 INJECTION, SOLUTION INTRAVENOUS at 01:40

## 2019-11-03 RX ADMIN — OXYCODONE HYDROCHLORIDE 10 MG: 10 TABLET ORAL at 20:40

## 2019-11-03 RX ADMIN — METHYLNALTREXONE BROMIDE 8 MG: 8 INJECTION, SOLUTION SUBCUTANEOUS at 17:53

## 2019-11-03 RX ADMIN — LORAZEPAM 0.5 MG: 1 TABLET ORAL at 20:27

## 2019-11-03 RX ADMIN — OXYCODONE HYDROCHLORIDE 10 MG: 10 TABLET ORAL at 16:21

## 2019-11-03 RX ADMIN — HEPARIN SODIUM 5000 UNITS: 5000 INJECTION, SOLUTION INTRAVENOUS; SUBCUTANEOUS at 23:58

## 2019-11-03 RX ADMIN — OMEPRAZOLE 40 MG: 20 CAPSULE, DELAYED RELEASE ORAL at 05:14

## 2019-11-03 RX ADMIN — OXYCODONE HYDROCHLORIDE 10 MG: 10 TABLET ORAL at 03:31

## 2019-11-03 RX ADMIN — LABETALOL HYDROCHLORIDE 10 MG: 5 INJECTION, SOLUTION INTRAVENOUS at 05:54

## 2019-11-03 RX ADMIN — PROMETHAZINE HYDROCHLORIDE 12.5 MG: 25 TABLET ORAL at 03:48

## 2019-11-03 RX ADMIN — LORAZEPAM 0.5 MG: 1 TABLET ORAL at 03:48

## 2019-11-03 RX ADMIN — MORPHINE SULFATE 15 MG: 15 TABLET, EXTENDED RELEASE ORAL at 05:14

## 2019-11-03 RX ADMIN — HEPARIN SODIUM 5000 UNITS: 5000 INJECTION, SOLUTION INTRAVENOUS; SUBCUTANEOUS at 05:14

## 2019-11-03 RX ADMIN — OXYCODONE HYDROCHLORIDE 10 MG: 10 TABLET ORAL at 09:33

## 2019-11-03 RX ADMIN — LISINOPRIL 20 MG: 20 TABLET ORAL at 04:09

## 2019-11-03 RX ADMIN — BISACODYL 10 MG: 10 SUPPOSITORY RECTAL at 09:33

## 2019-11-03 RX ADMIN — HEPARIN SODIUM 5000 UNITS: 5000 INJECTION, SOLUTION INTRAVENOUS; SUBCUTANEOUS at 13:15

## 2019-11-03 RX ADMIN — OLANZAPINE 2.5 MG: 5 TABLET, FILM COATED ORAL at 20:27

## 2019-11-03 RX ADMIN — SENNOSIDES AND DOCUSATE SODIUM 2 TABLET: 8.6; 5 TABLET ORAL at 17:53

## 2019-11-03 RX ADMIN — SENNOSIDES AND DOCUSATE SODIUM 2 TABLET: 8.6; 5 TABLET ORAL at 05:14

## 2019-11-03 RX ADMIN — OXYCODONE HYDROCHLORIDE 10 MG: 10 TABLET ORAL at 13:16

## 2019-11-03 RX ADMIN — LABETALOL HYDROCHLORIDE 10 MG: 5 INJECTION, SOLUTION INTRAVENOUS at 00:40

## 2019-11-03 RX ADMIN — PROMETHAZINE HYDROCHLORIDE 12.5 MG: 25 TABLET ORAL at 16:11

## 2019-11-03 RX ADMIN — OXYCODONE HYDROCHLORIDE 20 MG: 20 TABLET, FILM COATED, EXTENDED RELEASE ORAL at 17:53

## 2019-11-03 RX ADMIN — TAMSULOSIN HYDROCHLORIDE 0.4 MG: 0.4 CAPSULE ORAL at 09:33

## 2019-11-03 RX ADMIN — ONDANSETRON 8 MG: 4 TABLET, ORALLY DISINTEGRATING ORAL at 13:22

## 2019-11-03 NOTE — PROGRESS NOTES
MD Marie updated of uncontrolled hypertension despite administrations of labetolol. No new orders received.

## 2019-11-03 NOTE — PROGRESS NOTES
Bedside report completed.  A&O x4.  In no acute distress.   Patient has been hypertensive. on RA.  IVF NS 100mL/hr   Ambulating self  Tolerating regular diet, complaints of mild nausea, will medicate per MAR  Complaints of 5/10 abdominal pain, will medicate per MAR.  PINO drain to L abdomen with large amounts of serous output  J-tube to L abdomen with tube feed 10mL/hr  Last BM 10/30/19  Up to bathroom to void.    Call light and personal belongings within reach. POC discussed and all questions answered. No additional needs at this time.

## 2019-11-03 NOTE — PROGRESS NOTES
[facility] PROGRESS NOTE     Attending: Lucretia Pereyra M.D.  Senior Resident: shekhar Sheikh M.D. (PGY-3)  Jose Luis Resident: Niko Whittington M.D. (PGY-1)  PATIENT: Tima Rizzo; [id number]; 1970    ID: 49 y.o. male admitted for -tube placement 2/2 gastric cancer. POD#3 J-tube placemen    Overnight Events: Overnight the patient was hypertensive to the 160s.  Was given labetalol 10 mg injection 3 times.  Patient started on lisinopril 20 mg p.o. daily.  Pain is likely the etiology of elevated pressures we will monitor.  Will monitor closely and initiate more aggressive therapy if severe range pressures are measured.    Subjective: Patient in pain today.  He is sitting at the edge of the bed and complains of abdominal bloating.  He reports that this started around 4 AM this evening after his tube feeds were increased to 35 mL an hour.  He continues to have nausea but no vomiting.  Pain medications were increased yesterday with better pain control, but at this time he reports his pain is severe.  Patient has not had a bowel movement since October 30. Denies no fevers chills,'s chills, sweats, his headache, chest pain, shortness of breath, emesis, or dysuria.    OBJECTIVE:  Temp:  [36.6 °C (97.8 °F)-37.3 °C (99.2 °F)] 37.3 °C (99.2 °F)  Pulse:  [63-85] 85  Resp:  [14-17] 17  BP: (147-174)/(109-123) 149/113  SpO2:  [96 %-100 %] 98 %    Intake/Output Summary (Last 24 hours) at 11/3/2019 0551  Last data filed at 11/3/2019 0330  Gross per 24 hour   Intake 1930 ml   Output 3315 ml   Net -1385 ml       PE:  Gen: No Acute Distress   HEENT: NCAT   Pulm: CTABL, no respiratory distress   Cardio: NS1S2 NMRG   Abdom: No significant distention.  Bowel sounds present.  Port sites clean dry and intact with no  Erythema. PINO drain continues to have copious clear yellow fluid output.  Appropriate postop tenderness.  No rebound or guarding.  Ext: No edema, 2+ pulses     LABS:  Recent Labs     11/03/19  1054   WBC 11.7*   RBC  4.12*   HEMOGLOBIN 12.1*   HEMATOCRIT 37.1*   MCV 90.0   MCH 29.4   RDW 63.4*   PLATELETCT 431   MPV 9.7   NEUTSPOLYS 70.50   LYMPHOCYTES 21.00*   MONOCYTES 7.40   EOSINOPHILS 0.50   BASOPHILS 0.30     Recent Labs     11/03/19  1054   SODIUM 131*   POTASSIUM 4.2   CHLORIDE 101   CO2 23   BUN 6*   CREATININE 0.69   CALCIUM 8.6   MAGNESIUM 1.9   PHOSPHORUS 3.9     Estimated GFR/CRCL = Estimated Creatinine Clearance: 104.8 mL/min (by C-G formula based on SCr of 0.69 mg/dL).  Recent Labs     11/03/19  1054   GLUCOSE 113*                 No results for input(s): INR, APTT, FIBRINOGEN in the last 72 hours.    Invalid input(s): DIMER    MICROBIOLOGY:   No results found for: BLOODCULTU, BLDCULT, BCHOLD     IMAGING:   CT-CHEST,ABDOMEN,PELVIS WITH   Final Result      No evidence of thoracic metastases.      Interval placement of J-tube.      Apparent persistent gastric mass.      New mild bilateral hydronephrosis.      DX-G.I. TUBE INJECTION, ANY TYPE   Final Result      Enterostomy tube terminates in the jejunum and no contrast extravasation is identified          MEDS:  Current Facility-Administered Medications   Medication Last Dose   • lisinopril (PRINIVIL) tablet 20 mg 20 mg at 11/03/19 0409   • labetalol (NORMODYNE,TRANDATE) injection 10 mg 10 mg at 11/03/19 0554   • methylnaltrexone (RELISTOR) injection 8 mg     • oxyCODONE immediate release (ROXICODONE) tablet 10 mg 10 mg at 11/03/19 1316   • heparin injection 5,000 Units 5,000 Units at 11/03/19 1315   • zolpidem (AMBIEN) tablet 5-10 mg     • LORazepam (ATIVAN) tablet 0.5 mg 0.5 mg at 11/03/19 0348   • Pharmacy Consult: Enteral tube insertion - review meds/change route/product selection     • senna-docusate (PERICOLACE or SENOKOT S) 8.6-50 MG per tablet 2 Tab 2 Tab at 11/03/19 0514    And   • polyethylene glycol/lytes (MIRALAX) PACKET 1 Packet 1 Packet at 11/02/19 8756    And   • magnesium hydroxide (MILK OF MAGNESIA) suspension 30 mL      And   • bisacodyl (DULCOLAX)  suppository 10 mg 10 mg at 11/03/19 0933   • acetaminophen (TYLENOL) tablet 650 mg 650 mg at 11/02/19 0405   • ondansetron (ZOFRAN ODT) dispertab 8 mg 8 mg at 11/03/19 1322   • prochlorperazine (COMPAZINE) tablet 10 mg 10 mg at 10/31/19 0351   • promethazine (PHENERGAN) tablet 12.5 mg 12.5 mg at 11/03/19 0348   • tamsulosin (FLOMAX) capsule 0.4 mg 0.4 mg at 11/03/19 0933   • morphine ER (MS CONTIN) tablet 15 mg 15 mg at 11/03/19 0514   • omeprazole (PRILOSEC) capsule 40 mg 40 mg at 11/03/19 0514   • prochlorperazine (COMPAZINE) injection 10 mg           ASSESSMENT/PLAN: 49 y.o. male admitted for J-tube placement 2/2 to nausea, emesis, and pain all exacerbated by PO intake 2/2 gastric cancer      # Nausea/emesis  # Gastric  adenocarcinoma with carcinomatosis, biopsy-proven  # J-tube placement, POD#3  - PINO draining serous fluid with copious output.   - CT chest abdomen pelvis revealed no mets to the chest.  Redemonstrated gastric mass.  And no other masses noted.  - Oncology plans to do her-2 testing on biopsy.  - Will continue to follow oncology recommendations for further management, prognosis, work-up, and disposition  -Palliative care consult placed for advanced goals of care planning.  Specifically to discuss CODE STATUS.  -Will discuss placement of pigtail drain in light of high output from PINO drain with surgery for outpatient drainage.  -Restart tube feeds as tolerated.  - Continue:   Morphine IR 15 mg PO BiD  Morphine 2 mg IV Q 6hr PRN  Oxy 10 mg PO q3hr PRN     #Constipation secondary to chronic opioid use  -Patient receiving oral laxatives and suppository today with only small stool output.  Noted to be hard.  -Normal bowel sounds.  Do not suspect obstruction.  -Will give rRelistor 8 mg subQ 1x if okayed by surgery team.    #BPH  - Continue:  Flomax 0.4 mg PO daily     #Insomnia   -Zolpedem 5-10mg QHS (hold for sedation)     # Iron deficiency anemia  - Will consider supplementation after feeds reach  goal.      #Core Measures   VTE PPx: Resume heparin  Abx:N/A  Lines/Tubes:perfieral IV  Fluids:NS @ 100 mL/hr  Diet: NPO until after leak test  Code Status: Full code     Niko Whittington M.D.   PGY-1  [facility]   [phone]

## 2019-11-03 NOTE — PROGRESS NOTES
A&Ox4.   Pt states pain 6/10, medicated per MAR.   Pt expresses increased abdominal pressure this morning, per MD order, suppository given.   Abdomen firm, tender on palpation.   Decreased appetite, regular diet, mild nausea, + BS, - flatus, LBM 10/30 (2 small hard faby on 11/3)  Saturating >90% on RA.  Pt ambulates self, steady gait.  PIV SL.   J tube LLQ clamped, dressing in place, CDI. Nocturnal tube feed ordered to run at 35ml/hr.  PINO drain LLQ, dressing CDI, moderate yellow output with sediment.   Lap sites GERMANIA, CDI.     Updated on plan of care. Safety education provided. Bed locked in low. Call light within reach. Rounding in place.

## 2019-11-03 NOTE — CARE PLAN
Problem: Bowel/Gastric:  Goal: Normal bowel function is maintained or improved  Outcome: PROGRESSING SLOWER THAN EXPECTED   Suppository given this morning, no BM. Fluids encouraged.     Problem: Pain Management  Goal: Pain level will decrease to patient's comfort goal  Outcome: PROGRESSING SLOWER THAN EXPECTED   Pt states pain uncontrolled, oxy 10mg given PRN Q3, heat applied.

## 2019-11-03 NOTE — PROGRESS NOTES
MD Marie paged for patient's /112. This RN not given any new orders from MD. MD stated he will look into patient's chart.

## 2019-11-03 NOTE — CARE PLAN
Problem: Safety  Goal: Will remain free from falls  Outcome: PROGRESSING AS EXPECTED  Intervention: Implement fall precautions  Flowsheets  Taken 11/2/2019 2129  Bed Alarm: Alarm Not On  Taken 11/2/2019 1940  Environmental Precautions: Treaded Slipper Socks on Patient;Personal Belongings, Wastebasket, Call Bell etc. in Easy Reach;Transferred to Stronger Side;Bed in Low Position  Note:   Patient is a low fall risk. Patient educated to call for assistance. Call light left within reach of patient.       Problem: Infection  Goal: Will remain free from infection  Outcome: PROGRESSING AS EXPECTED  Intervention: Implement standard precautions and perform hand washing before and after patient contact  Note:   Hand hygiene performed prior to and after entering pt room. Gloves worn during patient interactions.

## 2019-11-04 ENCOUNTER — APPOINTMENT (OUTPATIENT)
Dept: RADIOLOGY | Facility: MEDICAL CENTER | Age: 49
DRG: 374 | End: 2019-11-04
Attending: INTERNAL MEDICINE
Payer: MEDICARE

## 2019-11-04 PROCEDURE — 700111 HCHG RX REV CODE 636 W/ 250 OVERRIDE (IP): Performed by: INTERNAL MEDICINE

## 2019-11-04 PROCEDURE — 700102 HCHG RX REV CODE 250 W/ 637 OVERRIDE(OP): Performed by: NURSE PRACTITIONER

## 2019-11-04 PROCEDURE — 700102 HCHG RX REV CODE 250 W/ 637 OVERRIDE(OP): Performed by: INTERNAL MEDICINE

## 2019-11-04 PROCEDURE — A9270 NON-COVERED ITEM OR SERVICE: HCPCS | Performed by: NURSE PRACTITIONER

## 2019-11-04 PROCEDURE — 770001 HCHG ROOM/CARE - MED/SURG/GYN PRIV*

## 2019-11-04 PROCEDURE — 700102 HCHG RX REV CODE 250 W/ 637 OVERRIDE(OP): Performed by: STUDENT IN AN ORGANIZED HEALTH CARE EDUCATION/TRAINING PROGRAM

## 2019-11-04 PROCEDURE — 99233 SBSQ HOSP IP/OBS HIGH 50: CPT | Performed by: NURSE PRACTITIONER

## 2019-11-04 PROCEDURE — 700111 HCHG RX REV CODE 636 W/ 250 OVERRIDE (IP): Performed by: STUDENT IN AN ORGANIZED HEALTH CARE EDUCATION/TRAINING PROGRAM

## 2019-11-04 PROCEDURE — A9270 NON-COVERED ITEM OR SERVICE: HCPCS | Performed by: INTERNAL MEDICINE

## 2019-11-04 PROCEDURE — 700105 HCHG RX REV CODE 258: Performed by: STUDENT IN AN ORGANIZED HEALTH CARE EDUCATION/TRAINING PROGRAM

## 2019-11-04 PROCEDURE — A9270 NON-COVERED ITEM OR SERVICE: HCPCS | Performed by: STUDENT IN AN ORGANIZED HEALTH CARE EDUCATION/TRAINING PROGRAM

## 2019-11-04 PROCEDURE — 74019 RADEX ABDOMEN 2 VIEWS: CPT

## 2019-11-04 RX ORDER — AMOXICILLIN 250 MG
2 CAPSULE ORAL 2 TIMES DAILY
Status: DISCONTINUED | OUTPATIENT
Start: 2019-11-04 | End: 2019-11-04

## 2019-11-04 RX ORDER — SODIUM CHLORIDE 9 MG/ML
INJECTION, SOLUTION INTRAVENOUS CONTINUOUS
Status: DISCONTINUED | OUTPATIENT
Start: 2019-11-04 | End: 2019-11-09

## 2019-11-04 RX ORDER — AMOXICILLIN 250 MG
2 CAPSULE ORAL 2 TIMES DAILY
Status: DISCONTINUED | OUTPATIENT
Start: 2019-11-04 | End: 2019-11-07

## 2019-11-04 RX ORDER — METOCLOPRAMIDE HYDROCHLORIDE 5 MG/ML
10 INJECTION INTRAMUSCULAR; INTRAVENOUS EVERY 8 HOURS
Status: DISCONTINUED | OUTPATIENT
Start: 2019-11-04 | End: 2019-11-09

## 2019-11-04 RX ORDER — POLYETHYLENE GLYCOL 3350 17 G/17G
1 POWDER, FOR SOLUTION ORAL 2 TIMES DAILY
Status: DISCONTINUED | OUTPATIENT
Start: 2019-11-04 | End: 2019-11-07

## 2019-11-04 RX ORDER — BISACODYL 10 MG
10 SUPPOSITORY, RECTAL RECTAL
Status: DISCONTINUED | OUTPATIENT
Start: 2019-11-04 | End: 2019-11-07

## 2019-11-04 RX ORDER — BISACODYL 10 MG
10 SUPPOSITORY, RECTAL RECTAL
Status: DISCONTINUED | OUTPATIENT
Start: 2019-11-04 | End: 2019-11-04

## 2019-11-04 RX ORDER — POLYETHYLENE GLYCOL 3350 17 G/17G
1 POWDER, FOR SOLUTION ORAL DAILY
Status: DISCONTINUED | OUTPATIENT
Start: 2019-11-04 | End: 2019-11-04

## 2019-11-04 RX ADMIN — SENNOSIDES AND DOCUSATE SODIUM 2 TABLET: 8.6; 5 TABLET ORAL at 16:39

## 2019-11-04 RX ADMIN — OLANZAPINE 2.5 MG: 5 TABLET, FILM COATED ORAL at 21:56

## 2019-11-04 RX ADMIN — METOCLOPRAMIDE 10 MG: 5 INJECTION, SOLUTION INTRAMUSCULAR; INTRAVENOUS at 13:01

## 2019-11-04 RX ADMIN — ONDANSETRON 8 MG: 4 TABLET, ORALLY DISINTEGRATING ORAL at 06:32

## 2019-11-04 RX ADMIN — OMEPRAZOLE 40 MG: 20 CAPSULE, DELAYED RELEASE ORAL at 06:25

## 2019-11-04 RX ADMIN — SODIUM CHLORIDE: 9 INJECTION, SOLUTION INTRAVENOUS at 10:45

## 2019-11-04 RX ADMIN — MAGNESIUM HYDROXIDE 30 ML: 400 SUSPENSION ORAL at 06:22

## 2019-11-04 RX ADMIN — METOCLOPRAMIDE 10 MG: 5 INJECTION, SOLUTION INTRAMUSCULAR; INTRAVENOUS at 21:54

## 2019-11-04 RX ADMIN — OXYCODONE HYDROCHLORIDE 20 MG: 20 TABLET, FILM COATED, EXTENDED RELEASE ORAL at 16:39

## 2019-11-04 RX ADMIN — SODIUM CHLORIDE: 9 INJECTION, SOLUTION INTRAVENOUS at 21:58

## 2019-11-04 RX ADMIN — OXYCODONE HYDROCHLORIDE 10 MG: 10 TABLET ORAL at 11:36

## 2019-11-04 RX ADMIN — OXYCODONE HYDROCHLORIDE 10 MG: 10 TABLET ORAL at 20:18

## 2019-11-04 RX ADMIN — LORAZEPAM 0.5 MG: 1 TABLET ORAL at 21:56

## 2019-11-04 RX ADMIN — HEPARIN SODIUM 5000 UNITS: 5000 INJECTION, SOLUTION INTRAVENOUS; SUBCUTANEOUS at 06:27

## 2019-11-04 RX ADMIN — LISINOPRIL 20 MG: 20 TABLET ORAL at 06:26

## 2019-11-04 RX ADMIN — OXYCODONE HYDROCHLORIDE 20 MG: 20 TABLET, FILM COATED, EXTENDED RELEASE ORAL at 06:26

## 2019-11-04 RX ADMIN — SENNOSIDES AND DOCUSATE SODIUM 2 TABLET: 8.6; 5 TABLET ORAL at 06:26

## 2019-11-04 RX ADMIN — HEPARIN SODIUM 5000 UNITS: 5000 INJECTION, SOLUTION INTRAVENOUS; SUBCUTANEOUS at 13:02

## 2019-11-04 RX ADMIN — POLYETHYLENE GLYCOL 3350 1 PACKET: 17 POWDER, FOR SOLUTION ORAL at 06:22

## 2019-11-04 RX ADMIN — POLYETHYLENE GLYCOL 3350 1 PACKET: 17 POWDER, FOR SOLUTION ORAL at 16:39

## 2019-11-04 RX ADMIN — HEPARIN SODIUM 5000 UNITS: 5000 INJECTION, SOLUTION INTRAVENOUS; SUBCUTANEOUS at 21:53

## 2019-11-04 ASSESSMENT — ENCOUNTER SYMPTOMS
VOMITING: 1
WEIGHT LOSS: 1
NAUSEA: 1
CARDIOVASCULAR NEGATIVE: 1
MUSCULOSKELETAL NEGATIVE: 1
DEPRESSION: 1
EYES NEGATIVE: 1
INSOMNIA: 1
RESPIRATORY NEGATIVE: 1
ROS GI COMMENTS: DECREASED APPETITE
ABDOMINAL PAIN: 1
CONSTIPATION: 1
NERVOUS/ANXIOUS: 1
NEUROLOGICAL NEGATIVE: 1

## 2019-11-04 NOTE — PROGRESS NOTES
[facility] MEDICINE PROGRESS NOTE     Attending: Ramesh Mckeon M.D.  Senior Resident: Jonas Vallejo M.D. (PGY-3)  Jose Luis Resident: Niko Whittington M.D. (PGY-1)  PATIENT: Tima Rizzo; [id number]; 1970    ID: 49 y.o. male admitted for J-tube placement 2/2 gastric cancer. POD#3 J-tube placemen.    Overnight Events: No acute events overnight events.    Subjective: This morning the patient had just taken morning medications with mirilax and has abdominal pain and nausea. Patients pain was better controlled overnight with medication change. Tube feeds again stopped this morning because of the patients discomfort. Patient had small amount of hard stool yesterday, but no bowl movement overnight. He otherwise denies any fevers, chills, sweats, or dysuria.     OBJECTIVE:  Temp:  [36.6 °C (97.8 °F)-37.5 °C (99.5 °F)] 37.3 °C (99.2 °F)  Pulse:  [68-95] 79  Resp:  [16-17] 16  BP: (129-172)/() 151/112  SpO2:  [95 %-98 %] 98 %    Intake/Output Summary (Last 24 hours) at 11/4/2019 0654  Last data filed at 11/4/2019 0612  Gross per 24 hour   Intake 1000 ml   Output 2575 ml   Net -1575 ml       PE:  Gen: No Acute Distress   HEENT: NCAT   Pulm: CTABL, no respiratory distress   Cardio: NS1S2 NMRG   Abdom: No significant distention.  Bowel sounds present.  Port sites clean dry and intact with no  Erythema. PINO drain with no contents.  Appropriate postop tenderness.  No rebound or guarding. Visualized J-tube insertion. Clean and dry with no discharge or signs of infection. [place] in place.  Ext: No edema, 2+ pulses     LABS:  Recent Labs     11/03/19  1054   WBC 11.7*   RBC 4.12*   HEMOGLOBIN 12.1*   HEMATOCRIT 37.1*   MCV 90.0   MCH 29.4   RDW 63.4*   PLATELETCT 431   MPV 9.7   NEUTSPOLYS 70.50   LYMPHOCYTES 21.00*   MONOCYTES 7.40   EOSINOPHILS 0.50   BASOPHILS 0.30     Recent Labs     11/03/19  1054   SODIUM 131*   POTASSIUM 4.2   CHLORIDE 101   CO2 23   BUN 6*   CREATININE 0.69   CALCIUM 8.6   MAGNESIUM 1.9   PHOSPHORUS 3.9      Estimated GFR/CRCL = Estimated Creatinine Clearance: 104.8 mL/min (by C-G formula based on SCr of 0.69 mg/dL).  Recent Labs     11/03/19  1054   GLUCOSE 113*                 No results for input(s): INR, APTT, FIBRINOGEN in the last 72 hours.    Invalid input(s): DIMER    MICROBIOLOGY:   No results found for: BLOODCULTU, BLDCULT, BCHOLD     IMAGING:   CT-CHEST,ABDOMEN,PELVIS WITH   Final Result      No evidence of thoracic metastases.      Interval placement of J-tube.      Apparent persistent gastric mass.      New mild bilateral hydronephrosis.      DX-G.I. TUBE INJECTION, ANY TYPE   Final Result      Enterostomy tube terminates in the jejunum and no contrast extravasation is identified          MEDS:  Current Facility-Administered Medications   Medication Last Dose   • lisinopril (PRINIVIL) tablet 20 mg 20 mg at 11/04/19 0626   • labetalol (NORMODYNE,TRANDATE) injection 10 mg 10 mg at 11/03/19 0554   • oxyCODONE CR (OXYCONTIN) tablet 20 mg 20 mg at 11/04/19 0626   • oxyCODONE immediate release (ROXICODONE) tablet 10-15 mg 10 mg at 11/03/19 2040   • HYDROmorphone pf (DILAUDID) injection 0.5 mg     • OLANZapine (ZYPREXA) tablet 2.5 mg 2.5 mg at 11/03/19 2027   • LORazepam (ATIVAN) tablet 0.5 mg 0.5 mg at 11/03/19 2027   • heparin injection 5,000 Units 5,000 Units at 11/04/19 0627   • zolpidem (AMBIEN) tablet 5-10 mg     • LORazepam (ATIVAN) tablet 0.5 mg 0.5 mg at 11/03/19 0348   • Pharmacy Consult: Enteral tube insertion - review meds/change route/product selection     • senna-docusate (PERICOLACE or SENOKOT S) 8.6-50 MG per tablet 2 Tab 2 Tab at 11/04/19 0626    And   • polyethylene glycol/lytes (MIRALAX) PACKET 1 Packet 1 Packet at 11/04/19 0622    And   • magnesium hydroxide (MILK OF MAGNESIA) suspension 30 mL 30 mL at 11/04/19 0622    And   • bisacodyl (DULCOLAX) suppository 10 mg 10 mg at 11/03/19 0933   • acetaminophen (TYLENOL) tablet 650 mg 650 mg at 11/02/19 0405   • ondansetron (ZOFRAN ODT) dispertab  8 mg 8 mg at 11/04/19 0632   • prochlorperazine (COMPAZINE) tablet 10 mg 10 mg at 10/31/19 0351   • promethazine (PHENERGAN) tablet 12.5 mg 12.5 mg at 11/03/19 1611   • tamsulosin (FLOMAX) capsule 0.4 mg 0.4 mg at 11/03/19 0933   • omeprazole (PRILOSEC) capsule 40 mg 40 mg at 11/04/19 0625   • prochlorperazine (COMPAZINE) injection 10 mg           ASSESSMENT/PLAN: 49 y.o. male admitted for J-tube placement 2/2 gastric cancer. POD#3 J-tube placemen.      # Nausea/emesis  # Gastric  adenocarcinoma with carcinomatosis, biopsy-proven  # J-tube placement, POD#3  - PINO draining serous fluid with copious output.   - CT chest abdomen pelvis revealed no mets to the chest.  Redemonstrated gastric mass.  And no other masses noted.  - Oncology plans to do her-2 testing on biopsy.  - Will continue to follow oncology recommendations for further management, prognosis, work-up, and disposition  -Palliative care on board for advanced goals of care planning.    - Will follow surgery recs for PINO drain management.  -Restart tube feeds as tolerated.  - Continue:   Oxy 20 mg PO BiD  Jenn 10-15 mg PO PRN  Dilaudid 0.5 mg IV PRN  Olanzapine 2.5 mg PO BiD for appetite stimulation and nausea suppression     #Constipation secondary to chronic opioid use complicated by carcinomatous and J-tube placement.  - Nurse will evaluate for fecal impaction.  - S/p Relistor 8 mg IM  - Will continue laxatives as tolerated and monitor closely.      #BPH  - Continue:  Flomax 0.4 mg PO daily     #Insomnia   -Zolpedem 5-10mg QHS (hold for sedation)     # Iron deficiency anemia  - Will consider supplementation after feeds reach goal.      #Core Measures   VTE PPx: Resume heparin  Abx:N/A  Lines/Tubes:perfieral IV  Fluids:NS @ 100 mL/hr  Diet: NPO until after leak test  Code Status: Full code     Niko Whittington M.D.   PGY-1  [facility]   [phone]

## 2019-11-04 NOTE — PROGRESS NOTES
A&Ox4.   Pt states pain 3/10, medicated per MAR previously.   Abdomen firm, tender on palpation. Pt refused checking for impaction.   Decreased appetite, regular diet, mild nausea, Hypoactive BS, - flatus, LBM 10/30 (2 small hard faby on 11/3). Bowel protocol in place.   Saturating >90% on RA.  Pt ambulates self, steady gait.  PIV SL.   J tube LLQ, dressing in place, CDI. Fibersource HN continuous at 60ml/hr, advance per protocol. Tube feed stopped at 0800 due to N/V.  PINO drain LLQ, dressing CDI, moderate yellow output.  Lap sites GERMANIA, CDI.   Pt educated on ambulating in halls, refused this morning d/t pain and nausea.   Updated on plan of care. Safety education provided. Bed locked in low. Call light within reach. Rounding in place.

## 2019-11-04 NOTE — PROGRESS NOTES
Pt is A&O 4  Pain adequately controlled at this time, pt medicated with oxycodone per MAR  Slight nausea at this time, tolerating well, not requiring PRN medication at this time  Tolerating Diet full liquid, low appetite.  Pt also receiving fibersource HN via J tube feed at 35mL/hr, advanced at 0000.  Monitoring pt for tolerance.  Goal rate is 130mL/hr  Surgical incision lap sites present to abdomen, open to air, well approximated, no drainage  + Urine Void   + Flatus  - BM Void last on 10/30, bowel protocal has been initiated  Up independent, pt encouraged to ambulate often while awake, steady gait, low fall risk per jairo pérez  SCD's declines at this time, receiving unfractionated heparin per MAR for DVT prophylaxis  Bed alarm n/a  Reviewed plan of care with patient, bed in lowest position and locked, pt resting comfortably now, call light within reach, all needs met at this time

## 2019-11-04 NOTE — PROGRESS NOTES
Dr. Whittington with family medicine paged regarding tube feed and diet, orders to start Fibersource NH at 10ml/hr and advance per protocol. Pt states he would like to restart tube feeds and reduce diet back down due to decreased appetite. Full diet ordered.

## 2019-11-04 NOTE — CARE PLAN
Problem: Bowel/Gastric:  Goal: Normal bowel function is maintained or improved  Outcome: PROGRESSING SLOWER THAN EXPECTED   Medicated for N/V, tube feed stopped this morning. Bowel protocol in place.     Problem: Pain Management  Goal: Pain level will decrease to patient's comfort goal  Outcome: PROGRESSING AS EXPECTED   Pt states pain better managed, declines interventions this morning.

## 2019-11-04 NOTE — CARE PLAN
Problem: Bowel/Gastric:  Goal: Normal bowel function is maintained or improved  Outcome: PROGRESSING AS EXPECTED  Note:   Pt tolerating tube feed at this time.  Currently running fibersource HN at 35mL/hr, goal rate of 130mL/hr, advancing per protocol, and pt tolerance.     Problem: Venous Thromboembolism (VTW)/Deep Vein Thrombosis (DVT) Prevention:  Goal: Patient will participate in Venous Thrombosis (VTE)/Deep Vein Thrombosis (DVT)Prevention Measures  Flowsheets (Taken 11/3/2019 2040)  Pharmacologic Prophylaxis Used: Unfractionated Heparin  Note:   Pt receiving unfractionated heparin per MAR for DVT prophylaxis.  Declining to wear SCD's at this time, pt walks independently in the room and hallway during the day.  Encouraging ambulation while awake

## 2019-11-04 NOTE — PALLIATIVE CARE
Palliative Care follow-up  Consult received and EMR reviewed; case discussed with BS RN and MD noting pain and nausea control continues to be an issue for Tima. Discussed pain/symptom management evaluation with MD and he is in agreement with PC provider helping to manage Tima's pain, followed by ACP when symptoms are better controlled. Discussed with PC APRN Taya who will f/u with the patient.    Updated: CHANDANA RN/MD/PC APRN    Plan: formal consult to follow    Thank you for allowing Palliative Care to participate in this patient's care. Please feel free to call [phone] with any questions or concerns.

## 2019-11-04 NOTE — PROGRESS NOTES
"Palliative Progress Note               Author: Taya Young Date & Time created: 2019  2:10 PM     Reason for subsequent visit: cancer-related abdominal pain    Interval History:  No acute events overnight.  Group rounds today with Dr. Rodrigez, Dr. Driver, Dr. Amezquita, Dr. Tracey, Romina Aguilar MS4.  Patient resting in bed, appears comfortable.  Neighbor Brianna visiting at bedside.  When queried about his pain Tima said his pain was \"getting better\" and feels his overall pain level is improved from yesterday.  He reports his pain was improved enough that he was able to get up and ambulate X2.  Current pain level 4/10.  Over last 24 hours: 3-4/10 (best), 6/10 (worst).  He reports his pain was worse this morning after taking \"a bunch of pills\" and miralax.  He also reports difficulty with regular diet, stating \"the food went down like gravel.\"  Reports his nausea is improved from yesterday, but he still experiences spikes in nausea if he eats too much, or if his tube feed rate is too high.  Denies passing flatus.  Last BM 10/30/19.  Reports he is still constipated.    Review of Systems:  Negative for dyspnea. Positive for pain (abdomen (all quadrants)). Positive for anorexia. Negative for fatigue. Negative for sedation. Positive for anxiety. Negative for depression. Negative for insomnia. Negative for diarrhea. Positive for nausea and vomiting. Positive for constipation. Negative for dysphagia. Negative for odynophagia.      Physical Exam:    Recent vital signs  Temp (24hrs), Av.1 °C (98.8 °F), Min:36.6 °C (97.9 °F), Max:37.5 °C (99.5 °F)  Temperature: 36.9 °C (98.5 °F)  Pulse  Av.1  Min: 60  Max: 98   Blood Pressure: 135/103       Physical Exam   Constitutional: He is oriented to person, place, and time. He appears cachectic. He is cooperative. He appears ill.   Cardiovascular: Normal rate and normal heart sounds.   Pulmonary/Chest: Effort normal and breath sounds normal.   Abdominal: He exhibits " distension (moderate). Bowel sounds are decreased. There is tenderness (all quadrants).       Musculoskeletal: Normal range of motion.         General: No edema.   Neurological: He is alert and oriented to person, place, and time.   Skin: Skin is warm and dry. There is pallor.   Psychiatric: His behavior is normal. Judgment and thought content normal. His mood appears anxious. He exhibits a depressed mood.     Recent Labs     11/03/19  1054   SODIUM 131*   POTASSIUM 4.2   CHLORIDE 101   CO2 23   GLUCOSE 113*   BUN 6*   CREATININE 0.69   CALCIUM 8.6     Recent Labs     11/03/19  1054   WBC 11.7*   RBC 4.12*   HEMOGLOBIN 12.1*   HEMATOCRIT 37.1*   MCV 90.0   MCH 29.4   MCHC 32.6*   RDW 63.4*   PLATELETCT 431   MPV 9.7     Medications reviewed. Labs Reviewed.     Problem List:  1.  Cancer-related abdominal pain (active)  2.  Acute post-op pain d/t J-tube placement 10/31/19 (active)  3.  Metastatic gastric adenocarcinoma (active)  4.  Cachexia/malnutrition (active)  5.  Nausea with intermittent vomiting (active)  6.  Anxiety (active)  7.  Opioid-induced constipation (active)  8.  Insomnia (active)  9.  Anemia (active) - transfusion parameters in place; primary team monitoring  10. Hyponatremia (active) - repletion per primary team  11. BPH (chronic) - on Flomax per primary team    Assessment/Plan:  Cancer-related abdominal pain  MEDD (morphine equivalent daily dose) is approximately 120 mg:  - OxyContin 20 mg x 2 doses = 40 mg x 1.5 ~ 60 mg MEDD  - oxycodone 10 mg x 4 doses = 40 mg x 1.5 ~ 60 mg MEDD  - hydromorphone 0.5 mg IV NONE    11/3/19 ~ 135 mg MEDD     LONG-ACTING OPIOID  Continue OxyContin 20 mg PO BID; up-titrate as appropriate based on MEDD  - patient reported negative side effects (increased nausea, decreased appetite, weight loss, fogginess) on MS Contin 30 mg BID     SHORT-ACTING OPIOID  Continue oxycodone from 10 mg PO Q3 hrs PRN moderate pain to 10-15 mg PO Q3 hrs PRN moderate pain (NRS 4-7)  Continue  "hydromorphone 0.5 mg IV Q2 hrs PRN severe breakthrough pain (NRS 7-10)  Provided in-depth education to patient about how to incorporate long-, short-, and rescue-medication for best pain control     ADJUNCT  Consider gabapentin.  However, patient reports he was on gabapentin in 2011 s/p back surgery, but felt \"it didn't help\" and \"didn't do anything\"  Consider duloxetine for dual effect of neuropathic pain treatment and depression (did not want to initiate too many medications today)  Continue acetaminophen 650 mg PO Q6 hrs PRN mild pain (NRS 1-3)     NON-PHARMACOLOGIC  Patient declined K-pad, reports heat therapy \"doesn't help\"  Consider consult to psychologist Pat Lin to offer mindfulness and coping strategies     Acute post-op pain d/t J-tube placement 10/31/19  See pain management strategies above     Metastatic gastric adenocarcinoma T4 N3 M1  Diagnosed May 2019 after upper GI endoscopy  S/p 5 cycles of FLOT chemotherapy (Dr. 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 Keyes, further management will depend on test results     Cachexia/malnutrition   J-tube placed by Dr. Cisneros 10/31/19  On FiberSource HN, currently 35 mL/hr with goal of 130 mL/hr  Last night, feeding pump turned off at 60 mL/hr d/t intractable nausea, discomfort  Patient advanced from full liquid to regular diet today  Encourage small, frequent meals, and PO intake  Continue olanzapine 2.5 mg PO QHS for nausea prophylaxis and appetite adjunct  - consider up-titration, if patient continues to tolerate     Nausea with intermittent vomiting  Start metoclopramide 10 mg IV Q8 hours scheduled as nausea prophylaxis and to promote bowel peristalsis  Continue ondansetron 8 mg ODT Q8 hours PRN nausea/vomiting  Continue " prochlorperazine 10 mg PO/IV Q6 hours PRN nausea/vomiting  Continue promethazine 12.5 mg PO Q6 hours PRN nausea/vomiting     Anxiety   Lorazepam 0.5 mg PO Q4 hours PRN anxiety  Placed spiritual care consult, with patient permission, for social visit/emotional support     Opioid-induced constipation   Order 2-view abdominal xray to assess distention and rule out bowel obstruction  Start metoclopramide 10 mg IV Q8 hours scheduled as nausea prophylaxis and to promote bowel peristalsis  Increase polyethylene glycol from 1 packet via enteral tube daily scheduled to 1 packet via enteral tube BID  Continue senokot 8.6-50 mg 2 tabs via enteral tube BID  Milk of Magnesia 30 mL via enteral tube daily PRN  Bisacodyl suppository 10 mg [place] daily PRN     Insomnia  Continue zolpidem 5-10 mg PO QHS PRN insomnia per primary team  Continue lorazepam 0.5 mg PO QHS; do not give concomitantly with PRN lorazepam     Code Status: Full    Advance Care Planning/Current Goals of Care: Deferred ACP discussion today due to patient's acute pain level.  Goal to discuss ACP and code status when pain is more controlled and patient is better able to focus.     35 minutes spent with greater than 50% spent counseling and coordinating the patient's care regarding acute pain & symptom management.   Please refer to HPI and assessment/plan for details.     Thank you for allowing the palliative care team to participate in Tima's care. Please call with any questions/needs.     FAUSTINA Rutledge, [facility]  Palliative Care Nurse Practitioner  [phone]

## 2019-11-04 NOTE — DIETARY
Nutrition support weekly update:  Day 6 of admit.  Tima Rizzo is a 49 y.o. male with admitting DX of dehydration and FTT.      Tube feeding initiated on 10/30. Current TF J-tube is Fibersource HN at 130 ml/hr for 12 hours (5122-3909) to provide 1872 kcals, 84 gm protein, and 1264 ml free water.    Assessment:  Weight : 57.2 kg via unknown scale. Pt is +7.3 L fuids per I/O flowsheet.    Re-estimate of needs indicated at this time due to pt not tolerating TF past 35 ml/hr.    Calculation: MSJ x 1.3 = 1816 kcals  Total Calories/day: 1700 - 2000 (Calories/k - 35)  Total Grams Protein/day:  86 - 103 (Grams/k.5 - 1.8)    Evaluation:   1. Pt has J-tube in place due to gastric cancer with Fibersource HN running at 35 ml/hr.  2. RN states pt has not eaten anything in 2 days d/t N/V and has not been able to tolerate TF Fibersource past 35 ml/hr.  3. Md notes pt having abdominal discomfort today with abdominal pain and nausea, TF held then restarted this morning. Noted small amount of hard stool yesterday, no BM overnight.  4. Palliative on consult for pain management.  5. Labs: Na 131, glucose 113, BUN 6  6. Specialized formula indicated due to increased protein needs, and intolerance of standard formula at this time. Please provide equivalent formula for home TF.      Malnutrition risk: From RD note on 10/30: Pt with severe chronic disease related malnutrition r/t gastric cancer as evidenced by 27% wt loss over 5-6 months and PO intake meeting <75% of estimated needs for > 1 month    Recommendations/Plan:  1. Change TF to Impact Peptide  continuous 24 hours with goal rate of 45 ml/hr to provide 1620 kcals, 102 gm protein, and 832 ml free water. Start at 10 ml/hr and advance to goal rate per protocol.   2. Final nocturnal TF of Impact Peptide @ 90 mL/hr x 12 hours (run from 1800 -0600) to provide 1620 kcal (28 kcal/kg), 102 grams protein (1.8 gm/kg), and 832 ml free water per day per MD when  appropriate.  3. Fluids per MD.  4. Diet per MD.    RD to follow.

## 2019-11-04 NOTE — PROGRESS NOTES
Palliative Care   Consulted with patient this afternoon for pain and symptom management.  Made modifications to patient's scheduled and PRN opioids.  Patient now on the following pain regimen:    Discontinue MS Contin 15 mg PO BID  Start OxyContin 20 mg PO BID  Change oxycodone from 10 mg PO Q3 hrs PRN moderate pain to 10-15 mg PO Q3 hrs PRN moderate pain (NRS 4-6)  Start hydromorphone 0.5 mg IV Q2 hrs PRN severe breakthrough pain (NRS 7-10)  Start olanzapine 2.5 mg PO QHS as nausea prophylaxis and appetite stimulant  Start lorazepam 0.5 mg PO QHS scheduled for anxiety    Full consult note to follow tomorrow.    FAUSTINA Rutledge, [facility]  Palliative Care Nurse Practitioner  [phone]

## 2019-11-04 NOTE — CARE PLAN
Problem: Nutritional:  Goal: Nutrition support tolerated and meeting greater than 85% of estimated needs  Outcome: PROGRESSING SLOWER THAN EXPECTED    See RD note.

## 2019-11-04 NOTE — CONSULTS
"Date & Time note created:    11/4/2019   7:19 AM       Date of Consult: 11/4/2019    Requesting Provider: Ruby Mcneill M.D.  Consulting Provider: Taya Young  Reason for Consult: Pain and symptom management    Chief Complaint: dehydration, malnutrition  HPI:   Tmia Rizzo is a 49 y.o. male who presented to the hospital 10/29/2019 as a direct admission from Dr. Simpson, due to dehydration and abdominal pain.  He has a past medical history of gastric adenocarcinoma (T4 N3 M1) involving the fundus of the stomach, was originally diagnosed when he had an upper gastrointestinal endoscopy performed by Dr. Marie on 5/09/2019, which was consistent with an adenocarcinoma with signet features.  PET scan from 06/18/2019 showed FDG avid gastric wall thickness   with no evidence of distant metastasis.  EUS was done by Dr. Bjorn Claudio from   06/12/2019 showed it to be a T4bN3a lesion.  Patient underwent 5 cycles on FLOT chemotherapy, last cycle in early October.  FLOT was generally well-tolerated, but patient was admitted to hospital after his fourth cycle due to severe diarrhea.  He has had ongoing difficulties with nutrition and was followed-up by Dr. Simpson.  POD#3J-tube was placed 10/31/19 by Dr. Cisneros.    Patient reports constant, \"tight\" abdominal pain since J-tube placement 3 days ago.  He said, \"I have a tumor on the left side.\"  Reports worsening pain and cramping with tube feeds.  He reports it got so bad last night, the nurse had to turn off the tube feeds.  Currently feeding pump infusing at 10 mL/hr.  Tima denies any dysphagia and reports he wants to eat, but can only tolerate small volumes of food and/or liquid.  Before his fifth round of chemo \"cereal was my 'go to', but I couldn't even tolerate that after the last chemo.  My stomach won't let me eat too much.\"  Patient exhibiting anxiety about malnutrition and dehydration.  Intermittently tearful during interview.    Pain " "History  Onset: Diffuse abdominal pain X 2-3 months; acute abdominal pain since J-tube placement 10/31/19  Location: abdomen (all quadrants)  Duration: constant  Characteristics: ache, piercing, cramping, \"super tight\"  Aggravating factors: eating (both liquid and solid), caffeine  Alleviating factors: NPO  Radiation: radiates into pelvic area  Treatments: MS Contin 15 mg PO BID, oxycodone 10 mg PO Q3 hrs PRN  Severity: Currently 4/10.  Over last 24 hours: 3/10 (best), 9/10 (worst).  Goal = 3-4/10     Home Medication Pain Regimen:  Opioid prescriptions written by Dr. Keyes  MS Contin 15 mg PO BID.  Dr. Keyes had increased MS Contin to 30 mg PO BID, but patient reports he had a negative reaction to the higher dose including increased nausea, decreased appetite, weight loss, and feeling mentally foggy.  He reports 30 mg dose was \"too strong.\"  Oxycodone 7.5 mg PO Q3 hours PRN     Past Medical History:   Diagnosis Date   • Anesthesia     slow to wake up after EGD   • Anxiety    • Bell palsy 2000; 2010    x2; mild left facial droop   • Cancer (HCC) 2019    Gastric   • Enlarged prostate    • History of positive PPD, untreated    • Hypertension    • Numbness of arm     and hands, bilateral   • Pain     neck and arms   • Prediabetes    • Psychiatric problem     depression    • Snoring    • Urinary bladder disorder     frequent urination (enlarged prostate)     Past Surgical History:   Procedure Laterality Date   • PB LAP,DIAGNOSTIC ABDOMEN  10/31/2019    Procedure: LAPAROSCOPY, DIAGNOSTIC, ROBOT-ASSISTED, USING DA KRISTINE XI;  Surgeon: Dyllan Cisneros M.D.;  Location: SURGERY [facility];  Service: General   • PB PLACEMENT ENTEROSTOMY/CECOSTOMY TUBE OPEN  10/31/2019    Procedure: CREATION, JEJUNOSTOMY;  Surgeon: Dyllan Cisneros M.D.;  Location: SURGERY [facility];  Service: General   • CATH PLACEMENT Right 6/13/2019    Procedure: INSERTION, CATHETER- CEPHALIC POWER PORT  ;  Surgeon: Dickson Simpson M.D.;  " Location: [facility];  Service: General   • PB UPPER GI ENDOSCOPY,BIOPSY  6/12/2019    Procedure: GASTROSCOPY, WITH BIOPSY - POSSIBLE;  Surgeon: Bjorn Claudio M.D.;  Location: [facility];  Service: Gastroenterology   • PB UPPER GI ENDOSCOPY,W/DILAT,GASTRIC OUT  6/12/2019    Procedure: GASTROSCOPY, WITH BALLOON DILATION;  Surgeon: Bjorn Claudio M.D.;  Location: [facility];  Service: Gastroenterology   • GASTROSCOPY  6/12/2019    Procedure: GASTROSCOPY;  Surgeon: Bjorn Claudio M.D.;  Location: [facility];  Service: Gastroenterology   • EGD W/ENDOSCOPIC ULTRASOUND  6/12/2019    Procedure: EGD, WITH ENDOSCOPIC US - UPPER, RADIAL;  Surgeon: Bjorn Claudio M.D.;  Location: [facility];  Service: Gastroenterology   • EGD WITH ASP/BX  6/12/2019    Procedure: EGD, WITH ASPIRATION BIOPSY - POSSIBLE;  Surgeon: Bjorn Claudio M.D.;  Location: [facility];  Service: Gastroenterology   • OTHER SURGICAL PROCEDURE  2014    cervical discectomy with plates     Current Medications:  No current facility-administered medications on file prior to encounter.      Current Outpatient Medications on File Prior to Encounter   Medication Sig Dispense Refill   • LORazepam (ATIVAN) 0.5 MG Tab Take 1 Tab by mouth 2 times a day as needed.  0   • prochlorperazine (COMPAZINE) 10 MG Tab Take 10 mg by mouth every 6 hours as needed. AS NEEDED FOR NAUSEA  0   • Morphine Sulfate ER 15 MG Tablet Extended Release 12 hour Abuse-Deterrent Take 30 mg by mouth 2 Times a Day.     • ondansetron (ZOFRAN ODT) 8 MG TABLET DISPERSIBLE Take 8 mg by mouth every 8 hours as needed for Nausea.     • oxycodone (OXY-IR) 15 MG immediate release tablet Take 7.5 mg by mouth every 3 hours as needed for Severe Pain.     • promethazine (PHENERGAN) 25 MG Tab Take 0.5 Tabs by mouth every 6 hours as needed. 30 Tab 0   • omeprazole (PRILOSEC) 40 MG delayed-release capsule Take 1 Cap by mouth  "every day. 30 Cap 11     Medication Allergy/Sensitivities:  No Known Allergies  Family History: Maternal side of family has heart problems and hypertension    Social History: Former smoker.  Smoked one pack/day X 33 years.  2 months ago he decreased to 5 cigarettes/day.  1 month ago he decreased to 2 cigarettes/day.  Quit 2 weeks ago.  Declined need for nicotine patch.  Has never used smokeless tobacco.  Social drinker.  Has used marijuana in the past.    Living situation: Patient is single and lives in [place].  He reports he worked as a  for 23 years.  He confirms he has \"good support\" from his mom, children, friends, and neighbors.    Palliative Performance Scale: 60%    Spiritual History: Patient open to chaplaincy visit    ROS:    Review of Systems   Constitutional: Positive for weight loss.   HENT: Negative.    Eyes: Negative.    Respiratory: Negative.    Cardiovascular: Negative.    Gastrointestinal: Positive for abdominal pain, constipation, nausea and vomiting.        Decreased appetite   Genitourinary:        BPH, on flomax   Musculoskeletal: Negative.    Skin: Negative.    Neurological: Negative.    Endo/Heme/Allergies: Negative.    Psychiatric/Behavioral: Positive for depression. The patient is nervous/anxious and has insomnia.      PE:   Recent vital signs  Weight/BMI: Body mass index is 19.75 kg/m².  /112   Pulse 79   Temp 37.3 °C (99.2 °F) (Temporal)   Resp 16   Ht 1.702 m (5' 7\")   Wt 57.2 kg (126 lb 1.7 oz)   SpO2 98%   BMI 19.75 kg/m²   Vitals:    11/03/19 1512 11/03/19 1906 11/03/19 2335 11/04/19 0340   BP: 129/87 138/92 157/112 151/112   Pulse: 95 85 78 79   Resp: 16 17 16 16   Temp: 36.6 °C (97.9 °F) 37.5 °C (99.5 °F) 37.1 °C (98.8 °F) 37.3 °C (99.2 °F)   TempSrc: Temporal Temporal Temporal Temporal   SpO2: 95% 96% 97% 98%   Weight:       Height:         Oxygen Therapy:  Pulse Oximetry: 98 %, O2 (LPM): 0, O2 Delivery: None (Room Air)  Physical Exam   Constitutional: He is " oriented to person, place, and time. Vital signs are normal. He appears malnourished. He appears unhealthy. He appears cachectic.   HENT:   Mouth/Throat: Oropharynx is clear and moist.   Cardiovascular: Normal rate and normal heart sounds.   Pulmonary/Chest: Effort normal and breath sounds normal. No respiratory distress. He has no wheezes.   Abdominal: He exhibits distension (mild). Bowel sounds are hypoactive. There is tenderness (all quadrants).       Musculoskeletal: Normal range of motion.         General: No edema.   Neurological: He is alert and oriented to person, place, and time.   Skin: Skin is warm and dry. There is pallor.   Psychiatric: Memory, affect and judgment normal. His mood appears anxious. He exhibits a depressed mood.     Lab Data Review:  Recent Results (from the past 24 hour(s))   CBC WITH DIFFERENTIAL    Collection Time: 11/03/19 10:54 AM   Result Value Ref Range    WBC 11.7 (H) 4.8 - 10.8 K/uL    RBC 4.12 (L) 4.70 - 6.10 M/uL    Hemoglobin 12.1 (L) 14.0 - 18.0 g/dL    Hematocrit 37.1 (L) 42.0 - 52.0 %    MCV 90.0 81.4 - 97.8 fL    MCH 29.4 27.0 - 33.0 pg    MCHC 32.6 (L) 33.7 - 35.3 g/dL    RDW 63.4 (H) 35.9 - 50.0 fL    Platelet Count 431 164 - 446 K/uL    MPV 9.7 9.0 - 12.9 fL    Neutrophils-Polys 70.50 44.00 - 72.00 %    Lymphocytes 21.00 (L) 22.00 - 41.00 %    Monocytes 7.40 0.00 - 13.40 %    Eosinophils 0.50 0.00 - 6.90 %    Basophils 0.30 0.00 - 1.80 %    Immature Granulocytes 0.30 0.00 - 0.90 %    Nucleated RBC 0.00 /100 WBC    Neutrophils (Absolute) 8.24 (H) 1.82 - 7.42 K/uL    Lymphs (Absolute) 2.45 1.00 - 4.80 K/uL    Monos (Absolute) 0.86 (H) 0.00 - 0.85 K/uL    Eos (Absolute) 0.06 0.00 - 0.51 K/uL    Baso (Absolute) 0.04 0.00 - 0.12 K/uL    Immature Granulocytes (abs) 0.03 0.00 - 0.11 K/uL    NRBC (Absolute) 0.00 K/uL   Basic Metabolic Panel    Collection Time: 11/03/19 10:54 AM   Result Value Ref Range    Sodium 131 (L) 135 - 145 mmol/L    Potassium 4.2 3.6 - 5.5 mmol/L     Chloride 101 96 - 112 mmol/L    Co2 23 20 - 33 mmol/L    Glucose 113 (H) 65 - 99 mg/dL    Bun 6 (L) 8 - 22 mg/dL    Creatinine 0.69 0.50 - 1.40 mg/dL    Calcium 8.6 8.5 - 10.5 mg/dL    Anion Gap 7.0 0.0 - 11.9   MAGNESIUM    Collection Time: 11/03/19 10:54 AM   Result Value Ref Range    Magnesium 1.9 1.5 - 2.5 mg/dL   PHOSPHORUS    Collection Time: 11/03/19 10:54 AM   Result Value Ref Range    Phosphorus 3.9 2.5 - 4.5 mg/dL   ESTIMATED GFR    Collection Time: 11/03/19 10:54 AM   Result Value Ref Range    GFR If African American >60 >60 mL/min/1.73 m 2    GFR If Non African American >60 >60 mL/min/1.73 m 2     Imaging/Procedures Review:    CT-CHEST,ABDOMEN,PELVIS WITH 11/1/19   Final Result      No evidence of thoracic metastases.      Interval placement of J-tube.      Apparent persistent gastric mass.      New mild bilateral hydronephrosis.      DX-G.I. TUBE INJECTION, ANY TYPE 11/1/19   Final Result      Enterostomy tube terminates in the jejunum and no contrast extravasation is identified         Problem List:  1.  Cancer-related abdominal pain (active)  2.  Acute post-op pain d/t J-tube placement 10/31/19 (active)  3.  Metastatic gastric adenocarcinoma (active)  4.  Cachexia/malnutrition (active)  5.  Nausea with intermittent vomiting (active)  6.  Anxiety (active)  7.  Opioid-induced constipation (active)  8.  Insomnia (active)  9.  Anemia (active) - transfusion parameters in place; primary team monitoring  10. Hyponatremia (active) - repletion per primary team  11. BPH (chronic) - on Flomax per primary team    DISCUSSION/RECOMMENDATIONS:     Assessment/Plan:  Cancer-related abdominal pain  MEDD (morphine equivalent daily dose) is approximately 135 mg:  - MS Contin 30 mg x 2 doses = 60 mg MEDD  - oxycodone 10 mg x 7 doses = 70 mg x 1.5 ~ 105 mg MEDD    LONG-ACTING OPIOID  Discontinue MS Contin 15 mg PO BID  - patient reported negative side effects (increased nausea, decreased appetite, weight loss, fogginess)  "when his oncologist previouslyincreased his dose to 30 mg BID  Start OxyContin 20 mg PO BID; up-titrate as appropriate    SHORT-ACTING OPIOID  Increase oxycodone from 10 mg PO Q3 hrs PRN moderate pain to 10-15 mg PO Q3 hrs PRN moderate pain (NRS 4-7)  Start hydromorphone 0.5 mg IV Q2 hrs PRN severe breakthrough pain (NRS 7-10)  Provided in-depth education to patient about how to incorporate long-, short-, and rescue-medication for best pain control    ADJUNCT  Consider gabapentin.  However, patient reports he was on gabapentin in 2011 s/p back surgery, but felt \"it didn't help\" and \"didn't do anything\"  Consider duloxetine for dual effect of neuropathic pain treatment and depression (did not want to initiate too many medications today)  Continue acetaminophen 650 mg PO Q6 hrs PRN mild pain (NRS 1-3)    NON-PHARMACOLOGIC  Patient declined K-pad, reports heat therapy \"doesn't help\"  Consider consult to psychologist Pat Lin to offer mindfulness and coping strategies    Acute post-op pain d/t J-tube placement 10/31/19  See pain management strategies above    Metastatic gastric adenocarcinoma T4 N3 M1  Diagnosed May 2019 after upper GI endoscopy  S/p 5 cycles of FLOT chemotherapy (Dr. 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 Keyes, further management will depend on test results    Cachexia/malnutrition   J-tube placed by Dr. Cisneros 10/31/19  On [employer] HN, currently 35 mL/hr with goal of 130 mL/hr  Last night, feeding pump turned off at 60 mL/hr d/t intractable nausea, discomfort  Patient advanced from full liquid to regular diet today  Encourage small, frequent meals, and PO intake  Start olanzapine 2.5 mg PO QHS for nausea prophylaxis and appetite adjunct    Nausea with intermittent " vomiting  Continue ondansetron 8 mg ODT Q8 hours PRN nausea/vomiting  Continue prochlorperazine 10 mg PO/IV Q6 hours PRN nausea/vomiting  Continue promethazine 12.5 mg PO Q6 hours PRN nausea/vomiting    Anxiety   Lorazepam 0.5 mg PO Q4 hours PRN anxiety    Opioid-induced constipation   Continue senokot 8.6-50 mg 2 tabs via enteral tube BID  Increase polyethylene glycol from 1 packet via enteral tube daily PRN to 1 packet via enteral tube daily scheduled  Milk of Magnesia 30 mL via enteral tube daily PRN  Bisacodyl suppository 10 mg RI daily PRN    Insomnia  Continue zolpidem 5-10 mg PO QHS PRN insomnia per primary team  Order lorazepam 0.5 mg PO QHS; do not give concomitantly with PRN lorazepam    Code Status: Full    Advance Care Planning/Current Goals of Care: Deferred ACP discussion today due to patient's acute pain level.  Goal to discuss ACP and code status when pain is more controlled and patient is better able to focus.      50 minutes spent with greater than 50% spent counseling and coordinating the patient's care regarding acute pain and symptom management.   Please refer to HPI and assessment/plan for details.     Thank you for allowing the palliative care team to participate in Tima's care. Please call with any questions/needs.     FAUSTINA Rutledge, [facility]-BC  Palliative Care Nurse Practitioner  [phone]

## 2019-11-05 PROCEDURE — A9270 NON-COVERED ITEM OR SERVICE: HCPCS | Performed by: INTERNAL MEDICINE

## 2019-11-05 PROCEDURE — 700102 HCHG RX REV CODE 250 W/ 637 OVERRIDE(OP): Performed by: STUDENT IN AN ORGANIZED HEALTH CARE EDUCATION/TRAINING PROGRAM

## 2019-11-05 PROCEDURE — 700102 HCHG RX REV CODE 250 W/ 637 OVERRIDE(OP): Performed by: INTERNAL MEDICINE

## 2019-11-05 PROCEDURE — A9270 NON-COVERED ITEM OR SERVICE: HCPCS | Performed by: NURSE PRACTITIONER

## 2019-11-05 PROCEDURE — 700102 HCHG RX REV CODE 250 W/ 637 OVERRIDE(OP): Performed by: NURSE PRACTITIONER

## 2019-11-05 PROCEDURE — 700111 HCHG RX REV CODE 636 W/ 250 OVERRIDE (IP): Performed by: INTERNAL MEDICINE

## 2019-11-05 PROCEDURE — 700111 HCHG RX REV CODE 636 W/ 250 OVERRIDE (IP): Performed by: STUDENT IN AN ORGANIZED HEALTH CARE EDUCATION/TRAINING PROGRAM

## 2019-11-05 PROCEDURE — 99231 SBSQ HOSP IP/OBS SF/LOW 25: CPT | Mod: 25 | Performed by: NURSE PRACTITIONER

## 2019-11-05 PROCEDURE — 99497 ADVNCD CARE PLAN 30 MIN: CPT | Performed by: NURSE PRACTITIONER

## 2019-11-05 PROCEDURE — 770001 HCHG ROOM/CARE - MED/SURG/GYN PRIV*

## 2019-11-05 PROCEDURE — A9270 NON-COVERED ITEM OR SERVICE: HCPCS | Performed by: STUDENT IN AN ORGANIZED HEALTH CARE EDUCATION/TRAINING PROGRAM

## 2019-11-05 PROCEDURE — 700105 HCHG RX REV CODE 258: Performed by: STUDENT IN AN ORGANIZED HEALTH CARE EDUCATION/TRAINING PROGRAM

## 2019-11-05 RX ORDER — AMLODIPINE BESYLATE 10 MG/1
10 TABLET ORAL
Status: DISCONTINUED | OUTPATIENT
Start: 2019-11-05 | End: 2019-11-09 | Stop reason: HOSPADM

## 2019-11-05 RX ADMIN — OLANZAPINE 2.5 MG: 5 TABLET, FILM COATED ORAL at 20:56

## 2019-11-05 RX ADMIN — OXYCODONE HYDROCHLORIDE 20 MG: 20 TABLET, FILM COATED, EXTENDED RELEASE ORAL at 05:25

## 2019-11-05 RX ADMIN — OMEPRAZOLE 40 MG: 20 CAPSULE, DELAYED RELEASE ORAL at 05:25

## 2019-11-05 RX ADMIN — SODIUM CHLORIDE: 9 INJECTION, SOLUTION INTRAVENOUS at 17:25

## 2019-11-05 RX ADMIN — ZOLPIDEM TARTRATE 5 MG: 5 TABLET ORAL at 21:00

## 2019-11-05 RX ADMIN — OXYCODONE HYDROCHLORIDE 10 MG: 10 TABLET ORAL at 04:23

## 2019-11-05 RX ADMIN — OXYCODONE HYDROCHLORIDE 10 MG: 10 TABLET ORAL at 21:02

## 2019-11-05 RX ADMIN — SENNOSIDES AND DOCUSATE SODIUM 2 TABLET: 8.6; 5 TABLET ORAL at 17:23

## 2019-11-05 RX ADMIN — OXYCODONE HYDROCHLORIDE 10 MG: 10 TABLET ORAL at 17:23

## 2019-11-05 RX ADMIN — LISINOPRIL 20 MG: 20 TABLET ORAL at 05:24

## 2019-11-05 RX ADMIN — AMLODIPINE BESYLATE 10 MG: 10 TABLET ORAL at 11:59

## 2019-11-05 RX ADMIN — POLYETHYLENE GLYCOL 3350 1 PACKET: 17 POWDER, FOR SOLUTION ORAL at 17:23

## 2019-11-05 RX ADMIN — LABETALOL HYDROCHLORIDE 10 MG: 5 INJECTION, SOLUTION INTRAVENOUS at 04:26

## 2019-11-05 RX ADMIN — TAMSULOSIN HYDROCHLORIDE 0.4 MG: 0.4 CAPSULE ORAL at 07:56

## 2019-11-05 RX ADMIN — LABETALOL HYDROCHLORIDE 10 MG: 5 INJECTION, SOLUTION INTRAVENOUS at 09:19

## 2019-11-05 RX ADMIN — METOCLOPRAMIDE 10 MG: 5 INJECTION, SOLUTION INTRAMUSCULAR; INTRAVENOUS at 05:24

## 2019-11-05 RX ADMIN — METOCLOPRAMIDE 10 MG: 5 INJECTION, SOLUTION INTRAMUSCULAR; INTRAVENOUS at 20:56

## 2019-11-05 RX ADMIN — SENNOSIDES AND DOCUSATE SODIUM 2 TABLET: 8.6; 5 TABLET ORAL at 06:40

## 2019-11-05 RX ADMIN — OXYCODONE HYDROCHLORIDE 10 MG: 10 TABLET ORAL at 13:31

## 2019-11-05 RX ADMIN — POLYETHYLENE GLYCOL 3350 1 PACKET: 17 POWDER, FOR SOLUTION ORAL at 06:40

## 2019-11-05 RX ADMIN — HEPARIN SODIUM 5000 UNITS: 5000 INJECTION, SOLUTION INTRAVENOUS; SUBCUTANEOUS at 13:31

## 2019-11-05 RX ADMIN — PROCHLORPERAZINE MALEATE 10 MG: 10 TABLET ORAL at 19:59

## 2019-11-05 RX ADMIN — METOCLOPRAMIDE 10 MG: 5 INJECTION, SOLUTION INTRAMUSCULAR; INTRAVENOUS at 13:31

## 2019-11-05 RX ADMIN — OXYCODONE HYDROCHLORIDE 20 MG: 20 TABLET, FILM COATED, EXTENDED RELEASE ORAL at 17:23

## 2019-11-05 RX ADMIN — HEPARIN SODIUM 5000 UNITS: 5000 INJECTION, SOLUTION INTRAVENOUS; SUBCUTANEOUS at 20:56

## 2019-11-05 RX ADMIN — SODIUM CHLORIDE: 9 INJECTION, SOLUTION INTRAVENOUS at 06:39

## 2019-11-05 NOTE — PROGRESS NOTES
Tube feeding rate increased to 35ml/hr at 2100. Pt tolerated well. Increased to 45ml/hr which is goal at 0500.  Pt with increased cramping this AM. bp at 162/111. Labetalol 10mg given with no relief. Lisinopril 20mg also given. Will monitor BP after  Pt passing flatus this am. Pt feeling better. miralax given BID. Will advance to suppository today if needed.

## 2019-11-05 NOTE — PROGRESS NOTES
Spiritual Care Note    Patient Information     Patient's Name: Tima Rizzo   MRN: [id number]    YOB: 1970   Age and Gender: 49 y.o. male   Service Area: [facility]   Room (and Bed): [place] [address]   Ethnicity or Nationality: White   Primary Language: English   Mormonism/Spiritual preference: Orthodox   Place of Residence: [place]   Family/Friends/Others Present: No   Clinical Team Present: RN   Medical Diagnosis(-es)/Procedure(s): Dehydration   Code Status: Full Code    Date of Admission: 10/29/2019   Length of Stay: 7 days        Spiritual Care Provider Information:  Name of Spiritual Care Provider: Anika Friend    Title of Spiritual Care Provider:   Phone Number: [phone]  E-mail: [email or web address]  Total time : 5 minutes    Spiritual Screen Results:    Gen Nursing  Is your spiritual health or inner well-being important to you as you cope with your medical condition?: Yes    Would you like to receive a visit from our Spiritual Care team or your own Mormonism or spiritual leader?: No    Was spiritual care education provided to the patient?: Declined    Sources of Hope/Dinora: Family, Mormonism/Spiritual community     Palliative Care  Is your spiritual health or inner well-being important to you as you cope with your medical condition?: Yes    Would you like to receive a visit from our Spiritual Care team or your own Mormonism or spiritual leader?: Yes    Was spiritual care education provided to the patient?: Declined    Encounter/Request Information  Visited With: Refused care    Notes:    PT was sleeping when  arrived for visit. PT requested that the  come back at another time.

## 2019-11-05 NOTE — PROGRESS NOTES
[facility] MEDICINE PROGRESS NOTE     Attending: Ramesh Mckeon M.D.  Senior Resident: Jonas Vallejo M.D. (PGY-3)  Jose Luis Resident: Niko Whittington M.D. (PGY-1)  PATIENT: Tima Rizzo; [id number]; 1970    ID: 49 y.o. male admitted for J-tube placement 2/2 gastric cancer. POD#4 J-tube placemen.    Overnight Events: No acute events overnight events.    Subjective: Patient reports feeling better overnight.  Patient was able to sleep 4 hours.  His pain is better controlled.  He still has been passing flatus overnight, and reports increased flatus production today.  Patient's nausea is well controlled and he has had no episodes of emesis.  Patient tolerating tube feeds well and is a 45 mL/h at this time.  Discussed goals with patient.  He reports he would like to go home.  The patient is aware to have a bowel movement tube feeding goal is reached patient will be ready for discharge.  Plan of care discussed patient is agreeable.    OBJECTIVE:  Temp:  [36.6 °C (97.9 °F)-37 °C (98.6 °F)] 36.7 °C (98 °F)  Pulse:  [65-87] 65  Resp:  [16-18] 16  BP: (135-163)/(100-115) 159/115  SpO2:  [96 %-98 %] 96 %    Intake/Output Summary (Last 24 hours) at 11/5/2019 0658  Last data filed at 11/5/2019 0639  Gross per 24 hour   Intake 2487.5 ml   Output 1600 ml   Net 887.5 ml       PE:  Gen: No Acute Distress   HEENT: NCAT   Pulm: CTABL, no respiratory distress   Cardio: NS1S2 NMRG   Abdom: Nondistended, bowel sounds present, appropriate postop tenderness, with no rebound or guarding.  Port sites clean dry and intact no erythema or discharge.  PINO drain draining yellow fluid.  J-tube with clean dry dressing in place.  Ext: No edema, 2+ pulses     LABS:  Recent Labs     11/03/19  1054   WBC 11.7*   RBC 4.12*   HEMOGLOBIN 12.1*   HEMATOCRIT 37.1*   MCV 90.0   MCH 29.4   RDW 63.4*   PLATELETCT 431   MPV 9.7   NEUTSPOLYS 70.50   LYMPHOCYTES 21.00*   MONOCYTES 7.40   EOSINOPHILS 0.50   BASOPHILS 0.30     Recent Labs     11/03/19  1054   SODIUM  131*   POTASSIUM 4.2   CHLORIDE 101   CO2 23   BUN 6*   CREATININE 0.69   CALCIUM 8.6   MAGNESIUM 1.9   PHOSPHORUS 3.9     Estimated GFR/CRCL = Estimated Creatinine Clearance: 104.8 mL/min (by C-G formula based on SCr of 0.69 mg/dL).  Recent Labs     11/03/19  1054   GLUCOSE 113*                 No results for input(s): INR, APTT, FIBRINOGEN in the last 72 hours.    Invalid input(s): DIMER    MICROBIOLOGY:   No results found for: BLOODCULTU, BLDCULT, BCHOLD     IMAGING:   [id number] VIEWS   Final Result      Contrast opacifies distal small bowel and the colon as evidence against obstruction.      CT-CHEST,ABDOMEN,PELVIS WITH   Final Result      No evidence of thoracic metastases.      Interval placement of J-tube.      Apparent persistent gastric mass.      New mild bilateral hydronephrosis.      DX-G.I. TUBE INJECTION, ANY TYPE   Final Result      Enterostomy tube terminates in the jejunum and no contrast extravasation is identified          MEDS:  Current Facility-Administered Medications   Medication Last Dose   • NS infusion     • metoclopramide (REGLAN) injection 10 mg 10 mg at 11/05/19 0524   • polyethylene glycol/lytes (MIRALAX) PACKET 1 Packet 1 Packet at 11/05/19 0640    And   • senna-docusate (PERICOLACE or SENOKOT S) 8.6-50 MG per tablet 2 Tab 2 Tab at 11/05/19 0640    And   • magnesium hydroxide (MILK OF MAGNESIA) suspension 30 mL      And   • bisacodyl (DULCOLAX) suppository 10 mg     • lisinopril (PRINIVIL) tablet 20 mg 20 mg at 11/05/19 0524   • labetalol (NORMODYNE,TRANDATE) injection 10 mg 10 mg at 11/05/19 0426   • oxyCODONE CR (OXYCONTIN) tablet 20 mg 20 mg at 11/05/19 0525   • oxyCODONE immediate release (ROXICODONE) tablet 10-15 mg 10 mg at 11/05/19 0423   • HYDROmorphone pf (DILAUDID) injection 0.5 mg     • OLANZapine (ZYPREXA) tablet 2.5 mg 2.5 mg at 11/04/19 2156   • LORazepam (ATIVAN) tablet 0.5 mg 0.5 mg at 11/04/19 2156   • heparin injection 5,000 Units 5,000 Units at 11/04/19 2153   •  zolpidem (AMBIEN) tablet 5-10 mg     • LORazepam (ATIVAN) tablet 0.5 mg 0.5 mg at 11/03/19 0348   • Pharmacy Consult: Enteral tube insertion - review meds/change route/product selection     • acetaminophen (TYLENOL) tablet 650 mg 650 mg at 11/02/19 0405   • ondansetron (ZOFRAN ODT) dispertab 8 mg 8 mg at 11/04/19 0632   • prochlorperazine (COMPAZINE) tablet 10 mg 10 mg at 10/31/19 0351   • promethazine (PHENERGAN) tablet 12.5 mg 12.5 mg at 11/03/19 1611   • tamsulosin (FLOMAX) capsule 0.4 mg 0.4 mg at 11/03/19 0933   • omeprazole (PRILOSEC) capsule 40 mg 40 mg at 11/05/19 0525   • prochlorperazine (COMPAZINE) injection 10 mg         ASSESSMENT/PLAN: 49 y.o. male admitted for J-tube placement 2/2 gastric cancer. POD#4 J-tube placemen.    # Nausea/emesis, improved.  # Gastric  adenocarcinoma with carcinomatosis, biopsy-proven  # J-tube placement, POD#4  -PINO drain output containing to decrease.   Hest abdomen pelvis revealed no mets to the chest.  Redemonstrated gastric mass.  And no other masses noted.  - Palliative care on board for advanced goals of care planning.    -Restart tube feeds as tolerated.  - Continue:   Oxy 20 mg PO BiD  Jenn 10-15 mg PO PRN  Dilaudid 0.5 mg IV PRN  Olanzapine 2.5 mg PO BiD for appetite stimulation and nausea suppression     #Constipation secondary to chronic opioid use complicated by carcinomatous and J-tube placement.  - 2 view x-ray abdomen with contrast showed no obstruction  - Bowel prep per palliative  - Patient passing gas last 12 hours     #BPH  - Continue:  Flomax 0.4 mg PO daily     #Insomnia   -Zolpedem 5-10mg QHS (hold for sedation)     # Iron deficiency anemia  - Will consider supplementation after feeds reach goal.      Dispo: Home with home health and palliative if tolerating feeds and having bowel movements.     #Core Measures   VTE PPx: heparin  Abx:N/A  Lines/Tubes:perfieral IV  Fluids:NS @ 100 mL/hr  Diet: NPO until after leak test  Code Status: Full code     Niko  BLANCHE Whittington.   PGY-1  [facility]   [phone]

## 2019-11-05 NOTE — PROGRESS NOTES
"Palliative Progress Note               Author: Taya Young Date & Time created: 2019  2:30 PM     Reason for subsequent visit: pain and symptom management/cancer-related abdominal pain    Interval History:  No acute events overnight.  Dr. Driver and I present during rounds.  Patient resting in bed, appears comfortable with bright mood.  When queried about his pain Tima said, \"I'm doing excellent.\"  Denies abdominal cramping after initiation of metoclopramide  Pain intensity currently 1-2/10.  Over last 24 hours: 1/10 (best), 2/10 (worst).  Patient reports he \"took 4 laps around the nurses station yesterday and took a warm shower.\"  Reports nausea is much improved. He now has minimal nausea with tube feeds.  Confirms intermittent insomnia stating his sleep is \"angelica rough.\"    Review of Systems:  Negative for dyspnea. Positive for pain (abdomen (all quadrants)). Positive for anorexia (pt still eating small portions/volume). Negative for fatigue. Negative for sedation. Negative for anxiety. Positive for depression (intermittently tearful). Positive for insomnia. Negative for diarrhea. Positive for nausea and vomiting (mild/improved). Positive for constipation. Negative for dysphagia. Negative for odynophagia.      Physical Exam:    Recent vital signs  Temp (24hrs), Av.7 °C (98.1 °F), Min:36.6 °C (97.9 °F), Max:37 °C (98.6 °F)  Temperature: 36.8 °C (98.2 °F)  Pulse  Av.6  Min: 60  Max: 98   Blood Pressure: 157/102       Physical Exam   Constitutional: He is oriented to person, place, and time. He appears cachectic. He is cooperative.   Eyes: EOM are normal.   Cardiovascular: Normal rate and normal heart sounds.   Pulmonary/Chest: Effort normal and breath sounds normal. No respiratory distress. He has no wheezes.   Abdominal: Soft. Bowel sounds are normal. He exhibits distension (mild). There is tenderness (mild).       Musculoskeletal: Normal range of motion.         General: No edema. "   Neurological: He is alert and oriented to person, place, and time.   Skin: Skin is warm and dry. There is pallor.   Psychiatric: He has a normal mood and affect. His behavior is normal. Judgment and thought content normal.     Recent Labs     11/03/19  1054   SODIUM 131*   POTASSIUM 4.2   CHLORIDE 101   CO2 23   GLUCOSE 113*   BUN 6*   CREATININE 0.69   CALCIUM 8.6     Recent Labs     11/03/19  1054   WBC 11.7*   RBC 4.12*   HEMOGLOBIN 12.1*   HEMATOCRIT 37.1*   MCV 90.0   MCH 29.4   MCHC 32.6*   RDW 63.4*   PLATELETCT 431   MPV 9.7     Medications reviewed. Labs Reviewed.     Imaging:  Diagnostic abdomen 2-view 11/4/19  IMPRESSION:   Contrast opacifies distal small bowel and the colon as evidence against obstruction.    Problem List:  1.  Cancer-related abdominal pain (improving)  2.  Acute post-op pain d/t J-tube placement 10/31/19 (improving)  3.  Metastatic gastric adenocarcinoma (active)  4.  Cachexia/malnutrition (active)  5.  Nausea with intermittent vomiting (stable/improving)  6.  Anxiety (stable)  7.  Opioid-induced constipation (active/improving)  8.  Insomnia (active)  9.  Anemia (active) - transfusion parameters in place; primary team monitoring  10. Hyponatremia (active) - repletion per primary team  11. BPH (chronic) - on Flomax per primary team    Assessment/Plan:  Cancer-related abdominal pain  MEDD (morphine equivalent daily dose) is approximately 105 mg:  - OxyContin 20 mg x 2 doses = 40 mg x 1.5 ~ 60 mg MEDD  - oxycodone 10 mg x 3 doses = 30 mg x 1.5 ~ 45 mg MEDD  - hydromorphone 0.5 mg IV NONE     11/4/19 ~ 120 mg MEDD  11/3/19 ~ 135 mg MEDD     LONG-ACTING OPIOID  Continue OxyContin 20 mg PO BID; up-titrate as appropriate based on MEDD  - patient reported negative side effects (increased nausea, decreased appetite, weight loss, fogginess) on MS Contin 30 mg BID     SHORT-ACTING OPIOID  Continue oxycodone 10-15 mg PO Q3 hrs PRN moderate pain (NRS 4-7)  Continue hydromorphone 0.5 mg IV Q2 hrs  "PRN severe breakthrough pain (NRS 7-10)  - no doses in last 24 hours    Left voicemail message for floor MILDRED Marcus @  requesting that she pre-authorize patient's OxyContin 20 mg PO BID prior to discharge.     ADJUNCT  Consider gabapentin.  However, patient reports he was on gabapentin in 2011 s/p back surgery, but felt \"it didn't help\" and \"didn't do anything\"  Consider duloxetine for dual effect of neuropathic pain treatment and depression (did not want to initiate too many medications today)  Continue acetaminophen 650 mg PO Q6 hrs PRN mild pain (NRS 1-3)     NON-PHARMACOLOGIC  Patient declined K-pad, reports heat therapy \"doesn't help\"  Consider consult to psychologist Pat Lin to offer mindfulness and coping strategies     Acute post-op pain d/t J-tube placement 10/31/19  See pain management strategies above     Metastatic gastric adenocarcinoma T4 N3 M1  Diagnosed May 2019 after upper GI endoscopy  S/p 5 cycles of FLOT chemotherapy (Dr. 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 Keyes, further management will depend on test results     Cachexia/malnutrition   J-tube placed by Dr. Cisneros 10/31/19  On FiberSource HN, currently 45 mL/hr with goal of 130 mL/hr; pt tolerating tube feeds better today with minimal nausea  Encourage small, frequent meals, and PO intake  Continue olanzapine 2.5 mg PO QHS for nausea prophylaxis and appetite adjunct  - consider up-titration, if patient continues to tolerate     Nausea with intermittent vomiting  Continue metoclopramide 10 mg IV Q8 hours scheduled as nausea prophylaxis and to promote bowel peristalsis  Continue PRN anti-emetics (ondansetron, prochlorperazine, promethazine)      Anxiety   Continue lorazepam 0.5 mg PO Q4 hours PRN anxiety  - " "advised patient anxiolytic was available PRN  Spiritual care consult placed 11/4/19, with patient permission, for social visit/emotional support     Opioid-induced constipation   Patient is now passing flatus, but has not yet had a BM  No SBO per diagnostic 2-view abdominal x-ray (see imaging above)  Continue metoclopramide 10 mg IV Q8 hours scheduled as nausea prophylaxis and to promote bowel peristalsis  Continue polyethylene glycol 1 packet via enteral tube BID; consider down-titrating once patient has BM  Continue senokot 8.6-50 mg 2 tabs via enteral tube BID  Milk of Magnesia 30 mL via enteral tube daily PRN  Bisacodyl suppository 10 mg VA daily PRN     Insomnia  Continue zolpidem 5-10 mg PO QHS PRN insomnia per primary team  Discontinue lorazepam 0.5 mg PO QHS  - advised patient he could continue to ask for PRN lorazepam at night, if desired     Code Status: Full    Advance Care Planning/Current Goals of Care: Discussed code status.  When queried about his thoughts regarding code status he said, \"I would like to be resuscitated, but I'm not sure about long-term life support.  I want to talk to my son about it.\"  Dr. Driver received permission from patient to explain measures employed in a full code, and shared that even if patient was able to be resuscitated, it would not change the reality of his cancer diagnosis.  Patient became tearful and said, \"So, you think I should just die?\"  I shared my concern that patient's body is in a fragile state, and I worry that he may not endure a full code well, which may result in increased suffering and decreased quality of life.  Dr. Driver reassured Tima that DNR/DNI does not mean do not treat, and that patient could have any and/or all treatments, if he desired.  She reassured patient the he did not need to make a decision about code status today.  Encouraged him to discuss with his family and ponder what his wishes would be.  17 minutes was spent discussing advance " care planning.     15 minutes spent with greater than 50% spent counseling and coordinating the patient's care regarding acute pain & symptom management.   Please refer to HPI and assessment/plan for details.     Thank you for allowing the palliative care team to participate in Tima's care. Please call with any questions/needs.     FAUSTINA Rutledge, [facility]  Palliative Care Nurse Practitioner  [phone]

## 2019-11-05 NOTE — PROGRESS NOTES
Assumed care at 1845. Pt resting in bed. A&ox 4  Ambulating independently  abd lap sites x3 with dermabond GERMANIA.  LQ jpx 1. Moderate serous output  jtube in place. Tube feeding increased to  35ml/hr @ 2100  Pain controlled with oxy  Voiding adequately  Tolerating full liq diet  No bm yet. Started on miralax BID. Will advance bowel protocol in AM if no bm tonight  Call light within reach. Hourly rounding in place

## 2019-11-05 NOTE — CARE PLAN
Problem: Safety  Goal: Will remain free from injury  Outcome: PROGRESSING AS EXPECTED  Goal: Will remain free from falls  Outcome: PROGRESSING AS EXPECTED   Updated about POC. Reinforce call light use. Pt acknowledged understanding    Problem: Bowel/Gastric:  Goal: Normal bowel function is maintained or improved  Outcome: PROGRESSING AS EXPECTED  Goal: Will not experience complications related to bowel motility  Outcome: PROGRESSING AS EXPECTED  Denies n/v. Tolerating tube feeding for now.      Problem: Venous Thromboembolism (VTW)/Deep Vein Thrombosis (DVT) Prevention:  Goal: Patient will participate in Venous Thrombosis (VTE)/Deep Vein Thrombosis (DVT)Prevention Measures  Outcome: PROGRESSING AS EXPECTED  Hep sub q given

## 2019-11-05 NOTE — PROGRESS NOTES
"Patient resting more comfortably today, tolerating new tube feeds at goal. +Flatus, starting to pass more stool on Miralax. BP remains high despite having started Lisinopril and RN IV labetalol, RN paged MD for orders. MD orders reviewed,a ll questions answered. /115   Pulse 65   Temp 36.7 °C (98 °F) (Temporal)   Resp 16   Ht 1.702 m (5' 7.01\")   Wt 57.2 kg (126 lb 1.7 oz)   SpO2 96%   BMI 19.75 kg/m²     "

## 2019-11-06 PROCEDURE — A9270 NON-COVERED ITEM OR SERVICE: HCPCS | Performed by: NURSE PRACTITIONER

## 2019-11-06 PROCEDURE — 700102 HCHG RX REV CODE 250 W/ 637 OVERRIDE(OP): Performed by: INTERNAL MEDICINE

## 2019-11-06 PROCEDURE — 700105 HCHG RX REV CODE 258: Performed by: STUDENT IN AN ORGANIZED HEALTH CARE EDUCATION/TRAINING PROGRAM

## 2019-11-06 PROCEDURE — 770001 HCHG ROOM/CARE - MED/SURG/GYN PRIV*

## 2019-11-06 PROCEDURE — 700111 HCHG RX REV CODE 636 W/ 250 OVERRIDE (IP): Performed by: INTERNAL MEDICINE

## 2019-11-06 PROCEDURE — 700111 HCHG RX REV CODE 636 W/ 250 OVERRIDE (IP): Performed by: STUDENT IN AN ORGANIZED HEALTH CARE EDUCATION/TRAINING PROGRAM

## 2019-11-06 PROCEDURE — 700102 HCHG RX REV CODE 250 W/ 637 OVERRIDE(OP): Performed by: NURSE PRACTITIONER

## 2019-11-06 PROCEDURE — 700102 HCHG RX REV CODE 250 W/ 637 OVERRIDE(OP): Performed by: STUDENT IN AN ORGANIZED HEALTH CARE EDUCATION/TRAINING PROGRAM

## 2019-11-06 PROCEDURE — 99231 SBSQ HOSP IP/OBS SF/LOW 25: CPT | Performed by: NURSE PRACTITIONER

## 2019-11-06 PROCEDURE — 700111 HCHG RX REV CODE 636 W/ 250 OVERRIDE (IP): Performed by: NURSE PRACTITIONER

## 2019-11-06 PROCEDURE — A9270 NON-COVERED ITEM OR SERVICE: HCPCS | Performed by: INTERNAL MEDICINE

## 2019-11-06 PROCEDURE — A9270 NON-COVERED ITEM OR SERVICE: HCPCS | Performed by: STUDENT IN AN ORGANIZED HEALTH CARE EDUCATION/TRAINING PROGRAM

## 2019-11-06 RX ORDER — DULOXETIN HYDROCHLORIDE 20 MG/1
20 CAPSULE, DELAYED RELEASE ORAL DAILY
Status: DISCONTINUED | OUTPATIENT
Start: 2019-11-06 | End: 2019-11-09 | Stop reason: HOSPADM

## 2019-11-06 RX ORDER — BISACODYL 10 MG
10 SUPPOSITORY, RECTAL RECTAL ONCE
Status: COMPLETED | OUTPATIENT
Start: 2019-11-06 | End: 2019-11-06

## 2019-11-06 RX ORDER — OXYCODONE HYDROCHLORIDE 10 MG/1
10-15 TABLET ORAL
Status: DISCONTINUED | OUTPATIENT
Start: 2019-11-06 | End: 2019-11-09 | Stop reason: HOSPADM

## 2019-11-06 RX ADMIN — OXYCODONE HYDROCHLORIDE 10 MG: 10 TABLET ORAL at 14:47

## 2019-11-06 RX ADMIN — SENNOSIDES AND DOCUSATE SODIUM 2 TABLET: 8.6; 5 TABLET ORAL at 19:38

## 2019-11-06 RX ADMIN — OXYCODONE HYDROCHLORIDE 10 MG: 10 TABLET ORAL at 21:58

## 2019-11-06 RX ADMIN — SODIUM CHLORIDE: 9 INJECTION, SOLUTION INTRAVENOUS at 04:29

## 2019-11-06 RX ADMIN — OXYCODONE HYDROCHLORIDE 20 MG: 20 TABLET, FILM COATED, EXTENDED RELEASE ORAL at 04:29

## 2019-11-06 RX ADMIN — OXYCODONE HYDROCHLORIDE 10 MG: 10 TABLET ORAL at 18:19

## 2019-11-06 RX ADMIN — HYDROMORPHONE HYDROCHLORIDE 0.5 MG: 1 INJECTION, SOLUTION INTRAMUSCULAR; INTRAVENOUS; SUBCUTANEOUS at 19:36

## 2019-11-06 RX ADMIN — METOCLOPRAMIDE 10 MG: 5 INJECTION, SOLUTION INTRAMUSCULAR; INTRAVENOUS at 04:29

## 2019-11-06 RX ADMIN — METOCLOPRAMIDE 10 MG: 5 INJECTION, SOLUTION INTRAMUSCULAR; INTRAVENOUS at 21:59

## 2019-11-06 RX ADMIN — LISINOPRIL 20 MG: 20 TABLET ORAL at 04:29

## 2019-11-06 RX ADMIN — OLANZAPINE 2.5 MG: 5 TABLET, FILM COATED ORAL at 21:58

## 2019-11-06 RX ADMIN — ZOLPIDEM TARTRATE 5 MG: 5 TABLET ORAL at 21:58

## 2019-11-06 RX ADMIN — HEPARIN SODIUM 5000 UNITS: 5000 INJECTION, SOLUTION INTRAVENOUS; SUBCUTANEOUS at 04:29

## 2019-11-06 RX ADMIN — AMLODIPINE BESYLATE 10 MG: 10 TABLET ORAL at 04:29

## 2019-11-06 RX ADMIN — HEPARIN SODIUM 5000 UNITS: 5000 INJECTION, SOLUTION INTRAVENOUS; SUBCUTANEOUS at 14:47

## 2019-11-06 RX ADMIN — ONDANSETRON 8 MG: 4 TABLET, ORALLY DISINTEGRATING ORAL at 09:16

## 2019-11-06 RX ADMIN — OXYCODONE HYDROCHLORIDE 10 MG: 10 TABLET ORAL at 09:17

## 2019-11-06 RX ADMIN — SODIUM CHLORIDE: 9 INJECTION, SOLUTION INTRAVENOUS at 16:26

## 2019-11-06 RX ADMIN — HYDROMORPHONE HYDROCHLORIDE 0.5 MG: 1 INJECTION, SOLUTION INTRAMUSCULAR; INTRAVENOUS; SUBCUTANEOUS at 16:26

## 2019-11-06 RX ADMIN — POLYETHYLENE GLYCOL 3350 1 PACKET: 17 POWDER, FOR SOLUTION ORAL at 19:38

## 2019-11-06 RX ADMIN — BISACODYL 10 MG: 10 SUPPOSITORY RECTAL at 19:38

## 2019-11-06 RX ADMIN — DULOXETINE HYDROCHLORIDE 20 MG: 20 CAPSULE, DELAYED RELEASE ORAL at 14:47

## 2019-11-06 RX ADMIN — OXYCODONE HYDROCHLORIDE 20 MG: 20 TABLET, FILM COATED, EXTENDED RELEASE ORAL at 16:31

## 2019-11-06 RX ADMIN — HEPARIN SODIUM 5000 UNITS: 5000 INJECTION, SOLUTION INTRAVENOUS; SUBCUTANEOUS at 21:59

## 2019-11-06 RX ADMIN — TAMSULOSIN HYDROCHLORIDE 0.4 MG: 0.4 CAPSULE ORAL at 09:17

## 2019-11-06 RX ADMIN — OMEPRAZOLE 40 MG: 20 CAPSULE, DELAYED RELEASE ORAL at 04:29

## 2019-11-06 RX ADMIN — METOCLOPRAMIDE 10 MG: 5 INJECTION, SOLUTION INTRAMUSCULAR; INTRAVENOUS at 14:47

## 2019-11-06 ASSESSMENT — COGNITIVE AND FUNCTIONAL STATUS - GENERAL
SUGGESTED CMS G CODE MODIFIER MOBILITY: CH
DAILY ACTIVITIY SCORE: 24
MOBILITY SCORE: 24
SUGGESTED CMS G CODE MODIFIER DAILY ACTIVITY: CH

## 2019-11-06 NOTE — FACE TO FACE
Face to Face Supporting Documentation - Home Health    The encounter with this patient was in whole or in part the primary reason for home health admission.    Date of encounter:   Patient:                    MRN:                       YOB: 2019  Tima Rizzo  [id number]  1970     Home health to see patient for:  Registered dietitian consult    Skilled need for:  Surgical Aftercare Feeding tube     Skilled nursing interventions to include:  Comment: tube feeding     Homebound status evidenced by:  Have a condition such that leaving his or her home is medically contraindicated. Leaving home requires a considerable and taxing effort. There is a normal inability to leave the home.    Community Physician to provide follow up care: Jenae Steward M.D.     Optional Interventions? No      I certify the face to face encounter for this home health care referral meets the CMS requirements and the encounter/clinical assessment with the patient was, in whole, or in part, for the medical condition(s) listed above, which is the primary reason for home health care. Based on my clinical findings: the service(s) are medically necessary, support the need for home health care, and the homebound criteria are met.  I certify that this patient has had a face to face encounter by myself.  Jonas Vallejo M.D. - NPI: [id number]

## 2019-11-06 NOTE — PROGRESS NOTES
[facility] MEDICINE PROGRESS NOTE     Attending: Ramesh Mckeon M.D.  Senior Resident: Jonas Vallejo M.D. (PGY-3)  Jose Luis Resident: Niko Whittington M.D. (PGY-1)  PATIENT: Tima Rizzo; [id number]; 1970    ID: 49 y.o. male admitted for J-tube placement 2/2 gastric cancer. POD#4 J-tube placemen.    Overnight Events: No acute events overnight events.    Subjective: Patient resting comfortably.  Reports he just ambulated to the restroom.  Still no bowel movement.  Patient does however report his pain is controlled.  He continues to pass flatus, and he feels less bloated.  Patient was able to achieve goal on tube feeds and receive first full nocturnal feed.  Patient reports some sweats overnight, but he does not believe these are out of the ordinary.  Denies any fevers, chills, chest pain, shortness of breath, headache, dizziness, abdominal pain, or dysuria.    OBJECTIVE:  Temp:  [36.7 °C (98 °F)-37.3 °C (99.2 °F)] 37.2 °C (98.9 °F)  Pulse:  [] 77  Resp:  [16-18] 18  BP: (117-159)/() 142/85  SpO2:  [95 %-99 %] 98 %    Intake/Output Summary (Last 24 hours) at 11/6/2019 0617  Last data filed at 11/6/2019 0549  Gross per 24 hour   Intake 3150 ml   Output 2870 ml   Net 280 ml       PE:  Gen: No Acute Distress   HEENT: NCAT   Pulm: CTABL, no respiratory distress   Cardio: NS1S2 NMRG   Abdom: Nondistended, port sites clean dry and intact.  Healing well with no erythema or discharge.  Clean dry dressing in place over the PINO drain and J-tube.  Continues to drain yellow fluid.  Bowel sounds present, with no significant tenderness, rebound, or guarding.  Ext: No edema, 2+ pulses     LABS:  Recent Labs     11/03/19  1054   WBC 11.7*   RBC 4.12*   HEMOGLOBIN 12.1*   HEMATOCRIT 37.1*   MCV 90.0   MCH 29.4   RDW 63.4*   PLATELETCT 431   MPV 9.7   NEUTSPOLYS 70.50   LYMPHOCYTES 21.00*   MONOCYTES 7.40   EOSINOPHILS 0.50   BASOPHILS 0.30     Recent Labs     11/03/19  1054   SODIUM 131*   POTASSIUM 4.2   CHLORIDE 101   CO2  23   BUN 6*   CREATININE 0.69   CALCIUM 8.6   MAGNESIUM 1.9   PHOSPHORUS 3.9     Estimated GFR/CRCL = Estimated Creatinine Clearance: 104.8 mL/min (by C-G formula based on SCr of 0.69 mg/dL).  Recent Labs     11/03/19  1054   GLUCOSE 113*                 No results for input(s): INR, APTT, FIBRINOGEN in the last 72 hours.    Invalid input(s): DIMER    MICROBIOLOGY:   No results found for: BLOODCULTU, BLDCULT, BCHOLD     IMAGING:   [id number] VIEWS   Final Result      Contrast opacifies distal small bowel and the colon as evidence against obstruction.      CT-CHEST,ABDOMEN,PELVIS WITH   Final Result      No evidence of thoracic metastases.      Interval placement of J-tube.      Apparent persistent gastric mass.      New mild bilateral hydronephrosis.      DX-G.I. TUBE INJECTION, ANY TYPE   Final Result      Enterostomy tube terminates in the jejunum and no contrast extravasation is identified          MEDS:  Current Facility-Administered Medications   Medication Last Dose   • amLODIPine (NORVASC) tablet 10 mg 10 mg at 11/06/19 0429   • NS infusion     • metoclopramide (REGLAN) injection 10 mg 10 mg at 11/06/19 0429   • polyethylene glycol/lytes (MIRALAX) PACKET 1 Packet 1 Packet at 11/05/19 1723    And   • senna-docusate (PERICOLACE or SENOKOT S) 8.6-50 MG per tablet 2 Tab 2 Tab at 11/05/19 1723    And   • magnesium hydroxide (MILK OF MAGNESIA) suspension 30 mL      And   • bisacodyl (DULCOLAX) suppository 10 mg     • lisinopril (PRINIVIL) tablet 20 mg 20 mg at 11/06/19 0429   • labetalol (NORMODYNE,TRANDATE) injection 10 mg 10 mg at 11/05/19 0919   • oxyCODONE CR (OXYCONTIN) tablet 20 mg 20 mg at 11/06/19 0429   • oxyCODONE immediate release (ROXICODONE) tablet 10-15 mg 10 mg at 11/05/19 2102   • HYDROmorphone pf (DILAUDID) injection 0.5 mg     • OLANZapine (ZYPREXA) tablet 2.5 mg 2.5 mg at 11/05/19 2056   • heparin injection 5,000 Units 5,000 Units at 11/06/19 0429   • zolpidem (AMBIEN) tablet 5-10 mg 5 mg at  11/05/19 2100   • LORazepam (ATIVAN) tablet 0.5 mg 0.5 mg at 11/03/19 0348   • Pharmacy Consult: Enteral tube insertion - review meds/change route/product selection     • acetaminophen (TYLENOL) tablet 650 mg 650 mg at 11/02/19 0405   • ondansetron (ZOFRAN ODT) dispertab 8 mg 8 mg at 11/04/19 0632   • prochlorperazine (COMPAZINE) tablet 10 mg 10 mg at 11/05/19 1959   • promethazine (PHENERGAN) tablet 12.5 mg 12.5 mg at 11/03/19 1611   • tamsulosin (FLOMAX) capsule 0.4 mg 0.4 mg at 11/05/19 0756   • omeprazole (PRILOSEC) capsule 40 mg 40 mg at 11/06/19 0429   • prochlorperazine (COMPAZINE) injection 10 mg         ASSESSMENT/PLAN: 49 y.o. male admitted forJ-tube placement 2/2 gastric cancer. POD#4 J-tube placemen.     # Nausea/emesis, improved.  # Gastric  adenocarcinoma with carcinomatosis, biopsy-proven  # J-tube placement, POD#4  -PINO drain output  relatively stable at 790 mL output for the last 24 hours.  -CT chest abdomen pelvis revealed no mets to the chest.  Redemonstrated gastric mass.  And no other masses noted.  - Palliative care on board for advanced goals of care planning.    -Tolerating tube feeds.  First full nocturnal feed overnight.  - Continue:   Oxy 20 mg PO BiD  Jenn 10-15 mg PO PRN  Dilaudid 0.5 mg IV PRN  Olanzapine 2.5 mg PO BiD for appetite stimulation and nausea suppression  Patient has poor prognosis and may receive palliative chemo, but will likely require J-tube feeds for the remainder of his life (Hopfully greater than 3 months.)     #Constipation secondary to chronic opioid use complicated by carcinomatous and J-tube placement.  - 2 view x-ray abdomen with contrast showed no obstruction  - Bowel prep per palliative   -Patient continued to pass flatus.  Will monitor.     #BPH  - Continue:  Flomax 0.4 mg PO daily     #Insomnia   -Zolpedem 5-10mg QHS (hold for sedation)     # Iron deficiency anemia  - Will consider supplementation after feeds reach goal.      Dispo: Patient tolerated full  course of tube feeds overnight.  If patient has bowel movement, he would be cleared for discharge home with home health.     #Core Measures   VTE PPx: heparin  Abx:N/A  Lines/Tubes:perfieral IV  Fluids:NS @ 100 mL/hr  Diet: NPO until after leak test  Code Status: Full code     Niko Whittington M.D.   PGY-1  [facility]   [phone]

## 2019-11-06 NOTE — PROGRESS NOTES
Bedside report received.  Assessment complete.  A&O x 4. Patient calls appropriately.  Patient up with no assist.   Patient has 7/10 pain. PRN given, pain in abdomen  Denies N&V. Tolerating regular diet, starting NOC feedings tonight at 1800.  Surgical G tube to Left abdomen is CDI, flushing Q4h, using for NOC feedings, otherwise CDI.  + void, + flatus, - BM.  Patient denies SOB.  Patient in pleasant mood, knows he needs to have a BM.  Review plan with of care with patient. Call light and personal belongings with in reach. Hourly rounding in place. All needs met at this time.

## 2019-11-06 NOTE — FACE TO FACE
Face to Face Note  -  Durable Medical Equipment    Niko Whittington M.D. - NPI: [id number]  I certify that this patient is under my care and that they have had a durable medical equipment(DME)face to face encounter by myself that meets the physician DME face-to-face encounter requirements with this patient on:    Date of encounter:   Patient:                    MRN:                       YOB: 2019  Tima Rizzo  [id number]  1970     The encounter with the patient was in whole, or in part, for the following medical condition, which is the primary reason for durable medical equipment:  Other - Gastric adenocarcinoma with carcinomatosis requioring J-tube placement and tube feeding.    I certify that, based on my findings, the following durable medical equipment is medically necessary:  Other DME Equipment - Nocturnal tube feed pump.    HOME O2 Saturation Measurements:(Values must be present for Home Oxygen orders)         ,     ,         My Clinical findings support the need for the above equipment due to:  Other - Gastric adinocarcinoma with carcinomatosis requioring J-tube and PINO drain    Supporting Symptoms: Intractable nausea and emesis and not able to tolerate PO nutrition.      ------------------------------------------------------------------------------------------------------------------    Face to Face Supporting Documentation - Home Health    The encounter with this patient was in whole or in part the primary reason for home health admission.    Date of encounter:   Patient:                    MRN:                       YOB: 2019  Tima Rizzo  [id number]  1970     Home health to see patient for:  Wound Care    Skilled need for:  Surgical Aftercare of PINO drain and J tube    Skilled nursing interventions to include:  Line/Drain/Airway education and care    Homebound evidenced status by:  Needs the assistance of another person in order to leave the  home. Leaving home must require a considerable and taxing effort. There must exist a normal inability to leave the home.    Community Physician to provide follow up care: Jenae Steward M.D.     Optional Interventions    Wound information & treatment:    Home Infusion Therapy orders:    Line/Drain/Airway:    I certify the face to face encounter for this home care referral meets the CMS requirements and the encounter/clinical assessment with the patient was, in whole, or in part, for the medical condition(s) listed above, which is the primary reason for home health care. Based on my clinical findings: the service(s) are medically necessary, support the need for home health care, and the homebound criteria are met.  I certify that this patient has had a face to face encounter by myself.  Niko Whittington M.D. - NPI: [id number]    *Debility, frailty and advanced age in the absence of an acute deterioration or exacerbation of a condition do not qualify a patient for home health.

## 2019-11-06 NOTE — DISCHARGE PLANNING
Received Choice form at 1035  Agency/Facility Name: [facility]  Referral sent per Choice form @ 1044    Received Choice form at 1030  Agency/Facility Name: [facility]   Referral sent per Choice form @ 104

## 2019-11-06 NOTE — PROGRESS NOTES
"Palliative Progress Note               Author: Taya WILSON Hector Date & Time created: 2019  11:05 AM     Reason for subsequent visit: cancer-related abdominal pain, acute post-surgical abdominal pain, nausea, constipation    Interval History:  Patient seen and assessed at bedside.  Dr. Driver, Dr. Amezquita, Dr. Tracey, Freda Ramos nursing , and myself present during rounds.  When queried about his pain Tima said, \"It's excellent!\"  He reports both pain and nausea are much-improved from 2-3 days ago, indicating he just has some residual surgical site soreness.  He said, \"I had some real good sleep last night.\"  He reports he slept 5-6 hours, then when he did awaken, he was in some pain requiring prn pain medication.  He is passing flatus, but still has not had a BM.  He has been ambulating frequently in hopes of promoting a BM.  He is anticipating discharging home later in the week with  and [facility] for tube feeds.    Review of Systems:  Negative for dyspnea. Positive for pain (abdomen, all quadrants). Negative for fatigue. Negative for sedation. Negative for anxiety. Negative for depression. Negative for insomnia. Negative for diarrhea. Negative for nausea and vomiting. Positive for constipation. Negative for dysphagia. Negative for odynophagia.      Physical Exam:    Recent vital signs  Temp (24hrs), Av.1 °C (98.7 °F), Min:36.9 °C (98.4 °F), Max:37.3 °C (99.2 °F)  Temperature: 37 °C (98.6 °F)  Pulse  Av.1  Min: 60  Max: 100   Blood Pressure: 130/94       Physical Exam   Constitutional: He is oriented to person, place, and time. He appears cachectic. He is active and cooperative.   HENT:   Head: Macrocephalic: mild white coating on tongue.   Eyes: EOM are normal.   Cardiovascular: Normal rate and normal heart sounds.   Pulmonary/Chest: Effort normal and breath sounds normal. No respiratory distress. He has no wheezes.   Abdominal: Soft. He exhibits distension (mild, improving). Bowel " sounds are decreased. There is tenderness (mild).       High-pitched bowel sounds RUQ/RLQ; hypoactive bowel sounds LUQ/LLQ   Musculoskeletal: Normal range of motion.         General: No edema.   Neurological: He is alert and oriented to person, place, and time.   Skin: Skin is warm and dry. There is pallor.   Psychiatric: He has a normal mood and affect. His behavior is normal. Judgment and thought content normal.     Medications reviewed. Labs Reviewed.     Problem List:  1.  Cancer-related abdominal pain (stable)  2.  Acute post-op pain d/t J-tube placement 10/31/19 (stable)  3.  Metastatic gastric adenocarcinoma (active)  4.  Cachexia/malnutrition (active)  5.  Nausea with intermittent vomiting (stable)  6.  Anxiety (stable)  7.  Opioid-induced constipation (active/improving)  8.  Insomnia (improving)  9.  Anemia (active) - transfusion parameters in place; primary team monitoring  10. Hyponatremia (active) - repletion per primary team  11. BPH (chronic) - on Flomax per primary team    Assessment/Plan:  Cancer-related abdominal pain  MEDD (morphine equivalent daily dose) is approximately 105 mg:  - OxyContin 20 mg x 2 doses = 40 mg x 1.5 ~ 60 mg MEDD  - oxycodone 10 mg x 3 doses = 30 mg x 1.5 ~ 45 mg MEDD  - hydromorphone 0.5 mg IV NONE     11/5/19 ~ 105 mg MEDD  11/4/19 ~ 120 mg MEDD  11/3/19 ~ 135 mg MEDD     LONG-ACTING OPIOID  Continue OxyContin 20 mg PO BID- patient's pain is stable on this dose; up-titrate as clinically indicated  - patient reported negative side effects (increased nausea, decreased appetite, weight loss, mental fogginess) on MS Contin 30 mg BID     SHORT-ACTING OPIOID  Continue oxycodone 10-15 mg PO Q3 hrs PRN moderate pain (NRS 4-7)  Continue hydromorphone 0.5 mg IV Q2 hrs PRN severe breakthrough pain (NRS 7-10)  - no doses in last 24 hours     Received [facility] from [facility].  I informed her primary team will need to sign prior auth form for insurance to pay for OxyContin.  I  "advised that our team does not provide OP opioid scripts.     ADJUNCT  Consider gabapentin.  However, patient reports he was on gabapentin in 2011 s/p back surgery, but felt \"it didn't help\" and \"didn't do anything\"  Start duloxetine 20 mg PO daily for dual effect of neuropathic pain treatment and depression  Continue acetaminophen 650 mg PO Q6 hrs PRN mild pain (NRS 1-3)    NON-PHARMACOLOGIC  Patient making effort to ambulate multiple times/day, which may help decrease pain and promote BM     Acute post-op pain d/t J-tube placement 10/31/19  See pain management strategies above     Metastatic gastric adenocarcinoma T4 N3 M1  Diagnosed May 2019 after upper GI endoscopy  S/p 5 cycles of FLOT chemotherapy (Dr. 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 Keyes, further management will depend on test results     Cachexia/malnutrition   J-tube placed by Dr. Cisneros 10/31/19  On FiberSource HN, currently 45 mL/hr with goal of 130 mL/hr; pt tolerating tube feeds better with minimal nausea  Encourage small, frequent meals, and PO intake  Continue olanzapine 2.5 mg PO QHS for nausea prophylaxis and appetite adjunct  - consider up-titration, if patient continues to tolerate     Nausea with intermittent vomiting  Continue metoclopramide 10 mg IV Q8 hours scheduled as nausea prophylaxis and to promote bowel peristalsis  Continue PRN anti-emetics (ondansetron, prochlorperazine, promethazine)      Anxiety   Continue lorazepam 0.5 mg PO Q4 hours PRN anxiety  - advised patient anxiolytic was available PRN  Spiritual care consult placed 11/4/19, with patient permission, for social visit/emotional support     Opioid-induced constipation   Patient is now passing flatus, but has not yet had a BM  No SBO per " diagnostic 2-view abdominal x-ray 11/4/19  Order bisacodyl suppository X1 now  Consider lactulose and/or enema, if no BM by tomorrow  Continue metoclopramide 10 mg IV Q8 hours scheduled as nausea prophylaxis and to promote bowel peristalsis  Continue polyethylene glycol 1 packet via enteral tube BID; consider down-titrating once patient has BM  Continue senokot 8.6-50 mg 2 tabs via enteral tube BID  Milk of Magnesia 30 mL via enteral tube daily PRN  Bisacodyl suppository 10 mg UT daily PRN     Insomnia  Continue zolpidem 5-10 mg PO QHS PRN insomnia per primary team  - advised patient he canask for PRN lorazepam at night, if desired to help promote sleep     Code Status: Full    Advance Care Planning/Current Goals of Care: Code status discussed 11/5/19 (see my note for details).  Patient wishes to remain full code at this time.  Left blank AD at bedside along with schedule of [facility] AD community workshops.  He would like time to review with his family before completing.  Our team is happy to assist with AD completion, when patient is ready.    15 minutes spent with greater than 50% spent counseling and coordinating the patient's care regarding acute pain & symptom management.   Please refer to HPI and assessment/plan for details.     Thank you for allowing the palliative care team to participate in Tima's care. Please call with any questions/needs.     FAUSTINA Rutledge, [facility]  Palliative Care Nurse Practitioner  [phone]

## 2019-11-06 NOTE — DIETARY
"Nutrition support weekly update:  Day 8 of admit.  Tima Rizzo is a 49 y.o. male with admitting DX of dehydration, failure to thrive.  Tube feeding initiated on 11/1.     Current TF via J-tube is Impact Peptide @ 45 ml/hr x 24 hr to provide 1620 kcals, 102 gm protein, and 832 ml free water.    Assessment:  Weight: 57.2 kg - scale type unspecified    Evaluation:     1. Per MD pt tolerated TF @ goal, will start nocturnal feed tonight.   2. MAR: reglan, NS infusion, prilosec, zofran, phenergan, bowel regimen  3. Labs: Na 131, glu 113, BUN 6  4. GI: LBM 11/4 (hard stool), + flatus. Nausea improved.   5. Pt seen by Palliative Care today. Pt wishes to remain full code at this time.   6. Per MD if pt has bowel movement today, will be cleared for DC home with HH.   7. Specialized TF formula remains appropriate. Per 11/4 RD note: \"Specialized formula indicated due to increased protein needs, and intolerance of standard formula at this time. Please provide equivalent formula for home TF.\"    Malnutrition risk: Per 10/30 RD note- \"Pt with severe chronic disease related malnutrition r/t gastric cancer as evidenced by 27% wt loss over 5-6 months and PO intake meeting <75% of estimated needs for > 1 month\"    Recommendations/Plan:  1. Nocturnal TF regimen via J-tube: Impact Peptide (or equivalent) @ 90 mL/hr x 12 hours (run from 0992-3180) to provide 1620 kcal (28 kcal/kg), 102 g protein, and 832 ml free water per day.  2. Continue nocturnal TF regimen via J-tube upon DC as tolerated.  3. Fluids per MD.  4. Encourage PO diet as tolerated/medically feasible.     RD continues to follow.             "

## 2019-11-06 NOTE — PROGRESS NOTES
Pt laying in bed, call light within reach, bed lowered and locked, fall education reinforced. Pt is A&Ox4 and on room air. Pt IV is clean,dry,intact,and patent and infusing the appropriate fluids. Pt lung sounds are clear in all lobes, bowel sounds are normoactive in all four quadrants, heart sounds are within defined limits. Pt is up self with a steady gait to ambulate. Pt reports mild to moderate pain in the abdomen, prn medications given. PT has lap sites x4 on the abdomen with dermabond GERMANIA CDI. PT has LLQ PINO drain and G tube with dressing of gauze and tape covering CDI. PINO drain has moderate serous output and G tube is running impact peptide 1.5 at 45 mL/hr which is goal. Nocturnal feeding is scheduled for tonight.

## 2019-11-07 PROCEDURE — 700102 HCHG RX REV CODE 250 W/ 637 OVERRIDE(OP): Performed by: STUDENT IN AN ORGANIZED HEALTH CARE EDUCATION/TRAINING PROGRAM

## 2019-11-07 PROCEDURE — 770001 HCHG ROOM/CARE - MED/SURG/GYN PRIV*

## 2019-11-07 PROCEDURE — 700111 HCHG RX REV CODE 636 W/ 250 OVERRIDE (IP): Performed by: NURSE PRACTITIONER

## 2019-11-07 PROCEDURE — A9270 NON-COVERED ITEM OR SERVICE: HCPCS | Performed by: STUDENT IN AN ORGANIZED HEALTH CARE EDUCATION/TRAINING PROGRAM

## 2019-11-07 PROCEDURE — 99232 SBSQ HOSP IP/OBS MODERATE 35: CPT | Performed by: NURSE PRACTITIONER

## 2019-11-07 PROCEDURE — 700102 HCHG RX REV CODE 250 W/ 637 OVERRIDE(OP): Performed by: INTERNAL MEDICINE

## 2019-11-07 PROCEDURE — A9270 NON-COVERED ITEM OR SERVICE: HCPCS | Performed by: INTERNAL MEDICINE

## 2019-11-07 PROCEDURE — A9270 NON-COVERED ITEM OR SERVICE: HCPCS | Performed by: NURSE PRACTITIONER

## 2019-11-07 PROCEDURE — 700111 HCHG RX REV CODE 636 W/ 250 OVERRIDE (IP): Performed by: INTERNAL MEDICINE

## 2019-11-07 PROCEDURE — 700111 HCHG RX REV CODE 636 W/ 250 OVERRIDE (IP): Performed by: STUDENT IN AN ORGANIZED HEALTH CARE EDUCATION/TRAINING PROGRAM

## 2019-11-07 PROCEDURE — 700105 HCHG RX REV CODE 258: Performed by: STUDENT IN AN ORGANIZED HEALTH CARE EDUCATION/TRAINING PROGRAM

## 2019-11-07 PROCEDURE — 700102 HCHG RX REV CODE 250 W/ 637 OVERRIDE(OP): Performed by: NURSE PRACTITIONER

## 2019-11-07 RX ORDER — AMOXICILLIN 250 MG
2 CAPSULE ORAL 2 TIMES DAILY
Status: DISCONTINUED | OUTPATIENT
Start: 2019-11-07 | End: 2019-11-08

## 2019-11-07 RX ORDER — SIMETHICONE 80 MG
80 TABLET,CHEWABLE ORAL 3 TIMES DAILY PRN
Status: DISCONTINUED | OUTPATIENT
Start: 2019-11-07 | End: 2019-11-09 | Stop reason: HOSPADM

## 2019-11-07 RX ORDER — BISACODYL 10 MG
10 SUPPOSITORY, RECTAL RECTAL DAILY
Status: DISCONTINUED | OUTPATIENT
Start: 2019-11-07 | End: 2019-11-08

## 2019-11-07 RX ORDER — CALCIUM CARBONATE 500 MG/1
500 TABLET, CHEWABLE ORAL
Status: DISCONTINUED | OUTPATIENT
Start: 2019-11-07 | End: 2019-11-09 | Stop reason: HOSPADM

## 2019-11-07 RX ORDER — POLYETHYLENE GLYCOL 3350 17 G/17G
1 POWDER, FOR SOLUTION ORAL 2 TIMES DAILY
Status: DISCONTINUED | OUTPATIENT
Start: 2019-11-07 | End: 2019-11-08

## 2019-11-07 RX ADMIN — AMLODIPINE BESYLATE 10 MG: 10 TABLET ORAL at 05:28

## 2019-11-07 RX ADMIN — PROCHLORPERAZINE EDISYLATE 10 MG: 5 INJECTION INTRAMUSCULAR; INTRAVENOUS at 05:34

## 2019-11-07 RX ADMIN — SODIUM CHLORIDE: 9 INJECTION, SOLUTION INTRAVENOUS at 05:28

## 2019-11-07 RX ADMIN — TAMSULOSIN HYDROCHLORIDE 0.4 MG: 0.4 CAPSULE ORAL at 09:27

## 2019-11-07 RX ADMIN — SENNOSIDES AND DOCUSATE SODIUM 2 TABLET: 8.6; 5 TABLET ORAL at 05:27

## 2019-11-07 RX ADMIN — ZOLPIDEM TARTRATE 5 MG: 5 TABLET ORAL at 20:44

## 2019-11-07 RX ADMIN — OMEPRAZOLE 40 MG: 20 CAPSULE, DELAYED RELEASE ORAL at 05:27

## 2019-11-07 RX ADMIN — SODIUM CHLORIDE: 9 INJECTION, SOLUTION INTRAVENOUS at 13:22

## 2019-11-07 RX ADMIN — OLANZAPINE 2.5 MG: 5 TABLET, FILM COATED ORAL at 20:44

## 2019-11-07 RX ADMIN — OXYCODONE HYDROCHLORIDE 10 MG: 10 TABLET ORAL at 20:44

## 2019-11-07 RX ADMIN — SODIUM CHLORIDE: 9 INJECTION, SOLUTION INTRAVENOUS at 20:43

## 2019-11-07 RX ADMIN — SIMETHICONE CHEW TAB 80 MG 80 MG: 80 TABLET ORAL at 09:31

## 2019-11-07 RX ADMIN — POLYETHYLENE GLYCOL 3350 1 PACKET: 17 POWDER, FOR SOLUTION ORAL at 05:27

## 2019-11-07 RX ADMIN — OXYCODONE HYDROCHLORIDE 20 MG: 20 TABLET, FILM COATED, EXTENDED RELEASE ORAL at 05:27

## 2019-11-07 RX ADMIN — POLYETHYLENE GLYCOL 3350 1 PACKET: 17 POWDER, FOR SOLUTION ORAL at 17:22

## 2019-11-07 RX ADMIN — METOCLOPRAMIDE 10 MG: 5 INJECTION, SOLUTION INTRAMUSCULAR; INTRAVENOUS at 13:25

## 2019-11-07 RX ADMIN — LISINOPRIL 20 MG: 20 TABLET ORAL at 05:27

## 2019-11-07 RX ADMIN — HYDROMORPHONE HYDROCHLORIDE 0.5 MG: 1 INJECTION, SOLUTION INTRAMUSCULAR; INTRAVENOUS; SUBCUTANEOUS at 15:31

## 2019-11-07 RX ADMIN — OXYCODONE HYDROCHLORIDE 20 MG: 20 TABLET, FILM COATED, EXTENDED RELEASE ORAL at 17:22

## 2019-11-07 RX ADMIN — SENNOSIDES AND DOCUSATE SODIUM 2 TABLET: 8.6; 5 TABLET ORAL at 17:22

## 2019-11-07 RX ADMIN — MAGNESIUM HYDROXIDE 30 ML: 400 SUSPENSION ORAL at 05:27

## 2019-11-07 RX ADMIN — ANTACID TABLETS 500 MG: 500 TABLET, CHEWABLE ORAL at 05:54

## 2019-11-07 RX ADMIN — OXYCODONE HYDROCHLORIDE 10 MG: 10 TABLET ORAL at 05:27

## 2019-11-07 RX ADMIN — METOCLOPRAMIDE 10 MG: 5 INJECTION, SOLUTION INTRAMUSCULAR; INTRAVENOUS at 20:43

## 2019-11-07 RX ADMIN — METOCLOPRAMIDE 10 MG: 5 INJECTION, SOLUTION INTRAMUSCULAR; INTRAVENOUS at 05:28

## 2019-11-07 RX ADMIN — HEPARIN SODIUM 5000 UNITS: 5000 INJECTION, SOLUTION INTRAVENOUS; SUBCUTANEOUS at 20:43

## 2019-11-07 RX ADMIN — DULOXETINE HYDROCHLORIDE 20 MG: 20 CAPSULE, DELAYED RELEASE ORAL at 05:27

## 2019-11-07 RX ADMIN — OXYCODONE HYDROCHLORIDE 10 MG: 10 TABLET ORAL at 13:34

## 2019-11-07 RX ADMIN — HEPARIN SODIUM 5000 UNITS: 5000 INJECTION, SOLUTION INTRAVENOUS; SUBCUTANEOUS at 13:25

## 2019-11-07 NOTE — CARE PLAN
Problem: Mobility  Goal: Risk for activity intolerance will decrease  Outcome: PROGRESSING SLOWER THAN EXPECTED

## 2019-11-07 NOTE — PROGRESS NOTES
Bedside report received.  Assessment complete.  A&O x 4. Patient calls appropriately.  Patient up with one assist. Bed alarm off.   Patient has 7/10 pain. PRN meds given  Denies N&V. Tolerating full liquid diet.  Surgical incision.  + void, + flatus, - BM.  Patient denies SOB.  SCD's off.  Patient is resting and in pleasant mood..  Review plan with of care with patient. Call light and personal belongings with in reach. Hourly rounding in place. All needs met at this time.

## 2019-11-07 NOTE — PALLIATIVE CARE
Spiritual Care Note    Patient Information     Patient's Name: Tima Rizzo   MRN: [id number]    YOB: 1970   Age and Gender: 49 y.o. male   Service Area: Palliative Care   Unit: GSU   Room (and Bed): T405   Ethnicity or Nationality: white   Primary Language: English   Bahai/Spiritual Preference: Jain   Place of Residence: [place], [place]   Family Present: son   Medical Diagnosis: gastric adenocarcinoma w/ carcinomatosis   Code Status: Full Code    Date of Admission: 10/29/2019   Length of Stay: 8 days        Spiritual Care Provider Information:  Title of Spiritual Care Provider:    Name of Spiritual Care Provider:   TIN Yañez  Phone Number:     [phone]   E-Mail:       [email or web address]  Total time:      5 minutes      Spiritual Screen Information  Is your spiritual health or inner well-being important to you as you cope with your medical condition?     yes  Would you like to receive a visit from our Spiritual Care team or your own Mu-ism or spiritual leader?     yes  Was spiritual care education provided to the patient?     declined    Sources of Hope/Dinora:     Family, Bahai/Spiritual community       Encounter/Request Information  Visited With:      Patient  Nature of the Visit:     Initial, On shift  Continue Visiting:     visit upon request  General Visit:      Yes  Referral From/Origin of Request:   [facility] nursing (palliative care APRN)  Interaction/Conversation:    Patient politely declined a spiritual care visit for now.

## 2019-11-07 NOTE — PROGRESS NOTES
Pt reports indigestion and gastric reflux this morning. Dr. Marie of the R family team consulted and orders were given for Tums and Simethicone prn.

## 2019-11-07 NOTE — DISCHARGE PLANNING
Agency/Facility Name: [facility]  Spoke To: Jyothi  Outcome: Will be to bedside at 1430 for teaching.     Updated tube feed order sent via fax @ 0900.    RN MILDRED Thomas notified.

## 2019-11-07 NOTE — PROGRESS NOTES
[facility] [place]     Attending: GAYATRI Sepulveda M.D.  Senior Resident: Jonas Vallejo M.D. (PGY-3)  PATIENT: Tima Rizzo; [id number]; 1970    S:  Patient states he feels overall well this morning, still reports that he has not had a bowel movement states that he wants to go home but is not sure if he will be able to go home until Saturday when his mother can take care of him.    O:   Temp:  [36.3 °C (97.4 °F)-36.9 °C (98.5 °F)] 36.3 °C (97.4 °F)  Pulse:  [67-82] 78  Resp:  [16-18] 16  BP: (107-138)/(69-95) 134/93  SpO2:  [94 %-97 %] 97 %  On exam the patient is alert and oriented, cachectic  HEENT: Normocephalic atraumatic with temporal wasting  Abdomen: Soft nontender except mild tenderness in the left lower quadrant with J-tube present, port sites healing well, bowel sounds hypoactive but present  Extremities: Moving all extremities, no edema    A/P:This is a 49-year-old gentleman with gastric adenocarcinoma who was admitted for a surgical G-tube placement 10 days ago.  The patient's postprocedural hospital stay has been complicated by a new diagnosis of metastases with carcinomatosis of the peritoneum.  Patient has been started on tube feeds but has not had consistent bowel function.  We will continue to work on improving consistent bowel function.  Discharge pending return of bowel function, consistent tolerance of feeds which are being switched back from 24-hour to nightly feeds.  We will continue to monitor the patient and address his pain control needs.      Jonas Vallejo M.D.   PGY-3  [facility]   [phone]

## 2019-11-07 NOTE — PROGRESS NOTES
Palliative Progress Note               Author: Yuni Peter Date & Time created: 2019  3:01 PM     Reason for subsequent visit: cancer-related abdominal pain, acute post-surgical abdominal pain, nausea, constipation    Interval History:  Rounded on patient at 08:40, however he was sleeping soundly.    Returned and gently woke patient up.  Patient seen and assessed at bedside.  Dr. Driver, Dr. Amezquita, Dr. Tracey, Romina Aguilar IV year medical student present, and RN.     Patient reports he slept well.  He denies abdominal pain or nausea but is complaining of heartburn that started this morning.  He expressed he wants to keep sleeping.     Review of Systems:  Negative for pain. Positive for fatigue. Negative for sedation. Negative for insomnia. Negative for nausea and vomiting (+ heartburn). Positive for constipation (very small bowel movment last night).      Physical Exam:    Recent vital signs  Temp (24hrs), Av.6 °C (97.9 °F), Min:36.3 °C (97.4 °F), Max:36.9 °C (98.5 °F)  Temperature: 36.3 °C (97.4 °F)  Pulse  Av  Min: 60  Max: 100   Blood Pressure: 134/93       Physical Exam   Constitutional: He is oriented to person, place, and time. He appears cachectic. He is sleeping.   HENT:   Mouth/Throat: Oropharynx is clear and moist.   Cardiovascular: Normal rate and normal heart sounds.   Pulmonary/Chest: Effort normal and breath sounds normal. No respiratory distress.   Abdominal: Soft. He exhibits distension (mild). Bowel sounds are increased. There is no tenderness.       Musculoskeletal:         General: No edema.   Neurological: He is oriented to person, place, and time.   Skin: Skin is warm and dry. There is pallor.   Psychiatric: His speech is normal and behavior is normal. He exhibits a depressed mood.   Nursing note and vitals reviewed.    Medications reviewed.     Problem List:  1.  Cancer-related abdominal pain (active/stable)  2.  Acute post-op pain d/t J-tube placement  "10/31/19 (resolved)  3.  Metastatic gastric adenocarcinoma (active)  4.  Cachexia/malnutrition (active)  5.  Nausea with intermittent vomiting (active/stable)  6.  Anxiety (active/stable)  7.  Opioid-induced constipation (active)  8.  Insomnia (active/stable)  9.  Anemia (active) - transfusion parameters in place; primary team monitoring  10. Hyponatremia (active) - repletion per primary team  11. BPH (chronic) - on tamsulosin per primary team    Assessment/Plan:  Cancer-related abdominal pain  MEDD (morphine equivalent daily dose) is approximately 160 mg:  - OxyContin 20 mg x 2 doses = 40 mg x 1.5 ~ 60 mg MEDD  - oxycodone 10 mg x 4 doses = 40 mg x 1.5 ~ 60 mg MEDD  - hydromorphone 0.5 mg IV x 2 doses = 1 mg x 20 ~ 20 mg MEDD     11/6/19 ~ 105 mg MEDD  11/5/19 ~ 105 mg MEDD  11/4/19 ~ 120 mg MEDD  11/3/19 ~ 135 mg MEDD     LONG-ACTING OPIOID  Continue OxyContin 20 mg PO BID  Patient reported negative side effects (increased nausea, decreased appetite, weight loss, mental fogginess) on MS Contin 30 mg BID     SHORT-ACTING OPIOID  Continue oxycodone 10-15 mg PO Q3 hrs PRN moderate pain (NRS 4-7)  Continue hydromorphone 0.5 mg IV Q2 hrs PRN severe breakthrough pain (NRS 7-10)     ADJUNCT  Consider gabapentin.  However, patient reports he was on gabapentin in 2011 s/p back surgery, but felt \"it didn't help\" and \"didn't do anything\"  Continue duloxetine 20 mg PO daily for dual effect of neuropathic pain treatment and depression   Continue acetaminophen 650 mg PO Q6 hrs PRN mild pain (NRS 1-3)    NON-PHARMACOLOGIC  Patient making effort to ambulate multiple times/day, which may help decrease pain and promote BM     Acute post-op pain d/t J-tube placement 10/31/19  See pain management strategies above     Metastatic gastric adenocarcinoma  Diagnosed May 2019 after upper GI endoscopy  S/p 5 cycles of FLOT chemotherapy (Dr. 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 Keyes, further management will depend on test results     Cachexia/malnutrition   J-tube placed by Dr. Cisneros 10/31/19  On FiberSource HN, currently 45 mL/hr with goal of 130 mL/hr; pt tolerating tube feeds better with minimal nausea  Encourage small, frequent meals, and PO intake  Continue olanzapine 2.5 mg PO QHS for nausea prophylaxis and appetite adjunct  - consider up-titration, if patient continues to tolerate     Nausea with intermittent vomiting  Continue metoclopramide 10 mg IV Q8 hours scheduled as nausea prophylaxis and to promote bowel peristalsis  Continue PRN anti-emetics (ondansetron, prochlorperazine, promethazine)      Anxiety   Continue lorazepam 0.5 mg PO Q4 hours PRN anxiety  Spiritual care consult placed 11/4/19, with patient permission, for social visit/emotional support     Opioid-induced constipation   Patient is now passing flatus with very small (insignificant) bowel movement 11/6/19 following bisacodyl suppository  No SBO per diagnostic 2-view abdominal x-ray 11/4/19  Consider adding lactulose tomorrow; patient wanted to ambulate and see if he can have a bowel movement after he wakes up and moves around more  Continue metoclopramide 10 mg IV Q8 hours scheduled as nausea prophylaxis and to promote bowel peristalsis  Continue polyethylene glycol 1 packet via enteral tube BID; consider down-titrating once patient has BM  Continue senokot 8.6-50 mg 2 tabs via enteral tube BID  Milk of Magnesia 30 mL via enteral tube daily PRN  Bisacodyl suppository 10 mg TN daily PRN     Insomnia  Continue zolpidem 5-10 mg PO QHS PRN insomnia per primary team     Code Status: Full    Advance Care Planning/Current Goals of Care: Code status discussed 11/5/19 by Taya WEEMS and was given blank AD packet along with schedule of [facility] Advance  Directive Workshops for community members.  He would like time to review with his family before completing.  Our team is happy to assist with AD completion, when patient is ready.    15 minutes spent with greater than 50% spent counseling and coordinating the patient's care regarding symptom management.   Please refer to HPI and assessment/plan for details.     Thank you for allowing the palliative care team to participate in Tima's care. Please call with any questions/needs.     Yuni Peter A.P.R.N.  Palliative Care Nurse Practitioner  [phone]

## 2019-11-07 NOTE — DISCHARGE PLANNING
Agency/Facility Name: [facility]  Spoke To: Lucretia  Outcome: Approved for services. Jyothi to schedule teaching for tomorrow at bedside.

## 2019-11-07 NOTE — CARE PLAN
Problem: Communication  Goal: The ability to communicate needs accurately and effectively will improve  Outcome: PROGRESSING AS EXPECTED     Problem: Infection  Goal: Will remain free from infection  Outcome: PROGRESSING AS EXPECTED     Problem: Respiratory:  Goal: Respiratory status will improve  Outcome: PROGRESSING AS EXPECTED     Problem: Bowel/Gastric:  Goal: Normal bowel function is maintained or improved  Outcome: PROGRESSING SLOWER THAN EXPECTED

## 2019-11-07 NOTE — PROGRESS NOTES
Pt laying in bed, call light within reach, bed lowered and locked, fall education reinforced. Pt is A&Ox4 and on room air. Pt IV is clean,dry,intact,and patent and infusing the appropriate fluids. Pt lung sounds are clear in all lobes, bowel sounds are normoactive in all four quadrants, heart sounds are within defined limits. Pt is up self with a steady gait to ambulate. Pt reports mild to moderate pain in the abdomen, prn medications given. PT has lap sites x4 on the abdomen with dermabond GERMANIA CDI. PT has LLQ PINO drain and G tube with dressing of gauze and tape covering CDI. PINO drain has moderate serous output and G tube is running impact peptide 1.5 at 90 mL/hr which is goal for nocturnal feedings from 1800 to 0600. Pt encouraged to take bowel medications and reports understanding.

## 2019-11-08 PROCEDURE — A9270 NON-COVERED ITEM OR SERVICE: HCPCS | Performed by: NURSE PRACTITIONER

## 2019-11-08 PROCEDURE — 700111 HCHG RX REV CODE 636 W/ 250 OVERRIDE (IP): Performed by: INTERNAL MEDICINE

## 2019-11-08 PROCEDURE — 770001 HCHG ROOM/CARE - MED/SURG/GYN PRIV*

## 2019-11-08 PROCEDURE — 700102 HCHG RX REV CODE 250 W/ 637 OVERRIDE(OP): Performed by: NURSE PRACTITIONER

## 2019-11-08 PROCEDURE — 700111 HCHG RX REV CODE 636 W/ 250 OVERRIDE (IP): Performed by: STUDENT IN AN ORGANIZED HEALTH CARE EDUCATION/TRAINING PROGRAM

## 2019-11-08 PROCEDURE — A9270 NON-COVERED ITEM OR SERVICE: HCPCS | Performed by: STUDENT IN AN ORGANIZED HEALTH CARE EDUCATION/TRAINING PROGRAM

## 2019-11-08 PROCEDURE — A9270 NON-COVERED ITEM OR SERVICE: HCPCS | Performed by: INTERNAL MEDICINE

## 2019-11-08 PROCEDURE — 700102 HCHG RX REV CODE 250 W/ 637 OVERRIDE(OP): Performed by: STUDENT IN AN ORGANIZED HEALTH CARE EDUCATION/TRAINING PROGRAM

## 2019-11-08 PROCEDURE — 700102 HCHG RX REV CODE 250 W/ 637 OVERRIDE(OP): Performed by: INTERNAL MEDICINE

## 2019-11-08 PROCEDURE — 700105 HCHG RX REV CODE 258: Performed by: STUDENT IN AN ORGANIZED HEALTH CARE EDUCATION/TRAINING PROGRAM

## 2019-11-08 PROCEDURE — 99232 SBSQ HOSP IP/OBS MODERATE 35: CPT | Performed by: NURSE PRACTITIONER

## 2019-11-08 RX ORDER — LACTULOSE 20 G/30ML
15 SOLUTION ORAL 3 TIMES DAILY
Status: DISCONTINUED | OUTPATIENT
Start: 2019-11-08 | End: 2019-11-09 | Stop reason: HOSPADM

## 2019-11-08 RX ORDER — LACTULOSE 20 G/30ML
15 SOLUTION ORAL EVERY EVENING
Status: DISCONTINUED | OUTPATIENT
Start: 2019-11-08 | End: 2019-11-08

## 2019-11-08 RX ORDER — POLYETHYLENE GLYCOL 3350 17 G/17G
1 POWDER, FOR SOLUTION ORAL
Status: DISCONTINUED | OUTPATIENT
Start: 2019-11-08 | End: 2019-11-09 | Stop reason: HOSPADM

## 2019-11-08 RX ORDER — OLANZAPINE 5 MG/1
2.5 TABLET ORAL 2 TIMES DAILY
Status: DISCONTINUED | OUTPATIENT
Start: 2019-11-08 | End: 2019-11-09 | Stop reason: HOSPADM

## 2019-11-08 RX ORDER — BISACODYL 10 MG
10 SUPPOSITORY, RECTAL RECTAL DAILY
Status: DISCONTINUED | OUTPATIENT
Start: 2019-11-09 | End: 2019-11-09 | Stop reason: HOSPADM

## 2019-11-08 RX ORDER — AMOXICILLIN 250 MG
2 CAPSULE ORAL 2 TIMES DAILY
Status: DISCONTINUED | OUTPATIENT
Start: 2019-11-08 | End: 2019-11-09 | Stop reason: HOSPADM

## 2019-11-08 RX ADMIN — OXYCODONE HYDROCHLORIDE 20 MG: 20 TABLET, FILM COATED, EXTENDED RELEASE ORAL at 05:32

## 2019-11-08 RX ADMIN — OXYCODONE HYDROCHLORIDE 20 MG: 20 TABLET, FILM COATED, EXTENDED RELEASE ORAL at 17:33

## 2019-11-08 RX ADMIN — SODIUM CHLORIDE: 9 INJECTION, SOLUTION INTRAVENOUS at 17:32

## 2019-11-08 RX ADMIN — SENNOSIDES AND DOCUSATE SODIUM 2 TABLET: 8.6; 5 TABLET ORAL at 05:36

## 2019-11-08 RX ADMIN — LISINOPRIL 20 MG: 20 TABLET ORAL at 05:36

## 2019-11-08 RX ADMIN — SENNOSIDES AND DOCUSATE SODIUM 2 TABLET: 8.6; 5 TABLET ORAL at 17:33

## 2019-11-08 RX ADMIN — PROCHLORPERAZINE EDISYLATE 10 MG: 5 INJECTION INTRAMUSCULAR; INTRAVENOUS at 05:26

## 2019-11-08 RX ADMIN — SIMETHICONE CHEW TAB 80 MG 80 MG: 80 TABLET ORAL at 05:26

## 2019-11-08 RX ADMIN — SODIUM CHLORIDE: 9 INJECTION, SOLUTION INTRAVENOUS at 05:31

## 2019-11-08 RX ADMIN — LACTULOSE 15 ML: 20 SOLUTION ORAL at 17:35

## 2019-11-08 RX ADMIN — OXYCODONE HYDROCHLORIDE 15 MG: 10 TABLET ORAL at 13:35

## 2019-11-08 RX ADMIN — OXYCODONE HYDROCHLORIDE 10 MG: 10 TABLET ORAL at 05:32

## 2019-11-08 RX ADMIN — OMEPRAZOLE 40 MG: 20 CAPSULE, DELAYED RELEASE ORAL at 05:34

## 2019-11-08 RX ADMIN — METOCLOPRAMIDE 10 MG: 5 INJECTION, SOLUTION INTRAMUSCULAR; INTRAVENOUS at 05:33

## 2019-11-08 RX ADMIN — ONDANSETRON 8 MG: 4 TABLET, ORALLY DISINTEGRATING ORAL at 05:32

## 2019-11-08 RX ADMIN — METOCLOPRAMIDE 10 MG: 5 INJECTION, SOLUTION INTRAMUSCULAR; INTRAVENOUS at 13:34

## 2019-11-08 RX ADMIN — OLANZAPINE 2.5 MG: 5 TABLET, FILM COATED ORAL at 11:18

## 2019-11-08 RX ADMIN — BISACODYL 10 MG: 10 SUPPOSITORY RECTAL at 05:37

## 2019-11-08 RX ADMIN — METOCLOPRAMIDE 10 MG: 5 INJECTION, SOLUTION INTRAMUSCULAR; INTRAVENOUS at 22:06

## 2019-11-08 RX ADMIN — AMLODIPINE BESYLATE 10 MG: 10 TABLET ORAL at 05:36

## 2019-11-08 RX ADMIN — DULOXETINE HYDROCHLORIDE 20 MG: 20 CAPSULE, DELAYED RELEASE ORAL at 05:37

## 2019-11-08 RX ADMIN — HEPARIN SODIUM 5000 UNITS: 5000 INJECTION, SOLUTION INTRAVENOUS; SUBCUTANEOUS at 13:34

## 2019-11-08 RX ADMIN — OLANZAPINE 2.5 MG: 5 TABLET, FILM COATED ORAL at 17:33

## 2019-11-08 RX ADMIN — OXYCODONE HYDROCHLORIDE 15 MG: 10 TABLET ORAL at 08:33

## 2019-11-08 RX ADMIN — HEPARIN SODIUM 5000 UNITS: 5000 INJECTION, SOLUTION INTRAVENOUS; SUBCUTANEOUS at 05:36

## 2019-11-08 RX ADMIN — HEPARIN SODIUM 5000 UNITS: 5000 INJECTION, SOLUTION INTRAVENOUS; SUBCUTANEOUS at 22:06

## 2019-11-08 RX ADMIN — OXYCODONE HYDROCHLORIDE 15 MG: 10 TABLET ORAL at 19:42

## 2019-11-08 RX ADMIN — TAMSULOSIN HYDROCHLORIDE 0.4 MG: 0.4 CAPSULE ORAL at 08:33

## 2019-11-08 NOTE — PROGRESS NOTES
Spiritual Care Note    Patient Information     Patient's Name: Tima Rizzo   MRN: [id number]    YOB: 1970   Age and Gender: 49 y.o. male   Service Area: [facility]   Room (and Bed): [place] [address]   Ethnicity or Nationality: White   Primary Language: English   Restorationist/Spiritual preference: Latter-day   Place of Residence: [place]   Family/Friends/Others Present: No   Clinical Team Present: No   Medical Diagnosis(-es)/Procedure(s): LAPAROSCOPY,   Code Status: Full Code    Date of Admission: 10/29/2019   Length of Stay: 10 days        Spiritual Care Provider Information:  Name of Spiritual Care Provider: Anika Friend  Title of Spiritual Care Provider:   Phone Number: [phone]    E-mail: [email or web address]  Total time : 5 minutes    Spiritual Screen Results:    Gen Nursing  Is your spiritual health or inner well-being important to you as you cope with your medical condition?: Yes  Would you like to receive a visit from our Spiritual Care team or your own Pentecostalism or spiritual leader?: No  Was spiritual care education provided to the patient?: Declined  Sources of Hope/Dinora: Family, Restorationist/Spiritual community     Palliative Care  Is your spiritual health or inner well-being important to you as you cope with your medical condition?: Yes  Would you like to receive a visit from our Spiritual Care team or your own Pentecostalism or spiritual leader?: Yes  Was spiritual care education provided to the patient?: Declined      Encounter/Request Information  Visited With: Patient  Nature of the Visit: Follow-up, On shift  Referral From/ Origin of Request: [facility] nursing    Spiritual Assessment   Interacton/Conversation: (Patient politely declined a spiritual care visit.)    Notes:

## 2019-11-08 NOTE — PROGRESS NOTES
Pt laying in bed, call light within reach, bed lowered and locked, fall education reinforced. Pt is A&Ox4 and on room air. Pt IV is clean,dry,intact,and patent and infusing the appropriate fluids. Pt lung sounds are clear in all lobes, bowel sounds are normoactive in all four quadrants, heart sounds are within defined limits. Pt is up self with a steady gait to ambulate. Pt reports mild to moderate pain in the abdomen, prn medications given. PT has lap sites x4 on the abdomen with dermabond GERMANIA CDI. PT has LLQ PINO drain and G tube with dressing of gauze and tape covering CDI. PINO drain has moderate serous output and G tube is running impact peptide 1.5 at 90 mL/hr which is goal for nocturnal feedings from 1800 to 0600. Pt encouraged to take bowel medications and reports understanding. I spoke to Fabiana Lombardo from the [facility] team and updated her on the patient's abdominal cramping and received orders for tube feed reduction from 90 to 45 until he is able to have a bowel movement. She also said the team would review my recommendation for Relistor to aid in the patient's constipation. No other orders given at this time

## 2019-11-08 NOTE — CARE PLAN
Problem: Knowledge Deficit  Goal: Knowledge of disease process/condition, treatment plan, diagnostic tests, and medications will improve  Outcome: PROGRESSING AS EXPECTED  Note:   Pt reports understanding of plan of care and has no questions at this time  Goal: Knowledge of the prescribed therapeutic regimen will improve  Outcome: PROGRESSING AS EXPECTED  Note:   Pt reports understanding of plan of care and has no questions at [facility]

## 2019-11-08 NOTE — PROGRESS NOTES
"Palliative Progress Note               Author: Yuniyessy Peter Date & Time created: 2019  2:33 PM     Reason for subsequent visit: cancer-related abdominal pain, acute post-surgical abdominal pain, nausea, constipation    Interval History:  Patient denies abdominal pain but reports abdominal cramping.  He confirms that the cramping decreased once his enteral tube feed rate was cut down from goal rate 90 mL/hr to 45 mL/hr due to in creased pain and abdominal cramping.  He has not passed yuri today but was passing gas yesterday.  He is eating very little \"mainly broth\" but drinking ample fluids. He is depressed and withdrawn.  He does not open his blinds during the day stating he doesn't want the bright light.     Review of Systems:  Negative for dyspnea. Positive for pain (abdominal cramping). Positive for anorexia (food aversion related to fear of nausea/pain). Positive for fatigue. Positive for depression. Positive for insomnia. Negative for nausea and vomiting. Positive for constipation. Negative for dysphagia. Negative for odynophagia.      Physical Exam:    Recent vital signs  Temp (24hrs), Av.9 °C (98.5 °F), Min:36.3 °C (97.3 °F), Max:37.5 °C (99.5 °F)  Temperature: 36.9 °C (98.4 °F)  Pulse  Av.2  Min: 60  Max: 100   Blood Pressure: 129/94       Physical Exam   Constitutional: He is oriented to person, place, and time. He appears cachectic. He appears ill.   HENT:   Mouth/Throat: Oropharyngeal exudate (white/brown coating of tongue) present.   Cardiovascular: Normal rate and normal heart sounds.   Pulmonary/Chest: Effort normal and breath sounds normal. No respiratory distress.   Abdominal: He exhibits distension (very mild). Bowel sounds are increased. There is tenderness.       Musculoskeletal:         General: No edema.   Neurological: He is oriented to person, place, and time.   Skin: Skin is warm and dry. There is pallor.   Psychiatric: His speech is normal. He is withdrawn. He exhibits a " "depressed mood.   Nursing note and vitals reviewed.    Medications reviewed.     Problem List:  1.  Cancer-related abdominal pain (active)  2.  Acute post-op pain d/t J-tube placement 10/31/19 (resolved)  3.  Metastatic gastric adenocarcinoma (active)  4.  Cachexia/malnutrition (active)  5.  Nausea with intermittent vomiting (active/stable)  6.  Adjustment disorder with depressed/anxious mood (active)  7.  Opioid-induced constipation versus carcinomatosis ileus (active)  8.  Insomnia (active)  9.  Anemia (active) - transfusion parameters in place; primary team monitoring  10. Hyponatremia (active) - repletion per primary team  11. BPH (chronic) - on tamsulosin per primary team  12. Oral thrush (active)    Assessment/Plan:  Cancer-related abdominal pain  MEDD (morphine equivalent daily dose) is approximately 95 mg:  - OxyContin 20 mg x 2 doses = 40 mg x 1.5 ~ 60 mg MEDD  - oxycodone 10 mg x 3 doses = 30 mg x 1.5 ~ 45 mg MEDD  - hydromorphone 0.5 mg IV x 1 doses = 0.5 mg x 20 ~ 10 mg MEDD    11/7/19 ~ 160 mg MEDD   11/6/19 ~ 105 mg MEDD  11/5/19 ~ 105 mg MEDD  11/4/19 ~ 120 mg MEDD  11/3/19 ~ 135 mg MEDD     LONG-ACTING OPIOID  Continue OxyContin 20 mg PO BID  Patient reported negative side effects (increased nausea, decreased appetite, weight loss, mental fogginess) on MS Contin 30 mg BID     SHORT-ACTING OPIOID  Continue oxycodone 10-15 mg PO Q3 hrs PRN moderate pain (NRS 4-7)  Continue hydromorphone 0.5 mg IV Q2 hrs PRN severe breakthrough pain (NRS 7-10)     ADJUNCT  Patient reports he was on gabapentin in 2011 s/p back surgery, but felt \"it didn't help\" and \"didn't do anything\"  Continue duloxetine 20 mg PO daily for dual effect of neuropathic pain treatment and depression   Continue acetaminophen 650 mg PO Q6 hrs PRN mild pain (NRS 1-3)    NON-PHARMACOLOGIC  Patient making effort to ambulate multiple times/day, which may help decrease pain and promote BM    Placed visual cue sign above patient's bed for nursing " to administer medications/fluids at slow/gravity pace through J-tube as there is no reservoir and placed nursing communication order.     Acute post-op pain d/t J-tube placement 10/31/19  See pain management strategies above     Metastatic gastric adenocarcinoma  Diagnosed May 2019 after upper GI endoscopy  S/p 5 cycles of FLOT chemotherapy (Dr. 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 Keyes, further management will depend on test results     Cachexia/malnutrition and nausea  J-tube placed by Dr. Cisneros 10/31/19  On FiberSource HN, currently 45 mL/hr with goal of 90 mL/hr; pt tolerating tube feeds better with minimal nausea  Encourage small, frequent meals, and PO intake  Increase olanzapine 2.5 mg PO BID for nausea prophylaxis and appetite adjunct  - consider continued up-titration     Nausea  Continue metoclopramide 10 mg IV Q8 hours scheduled as nausea prophylaxis and to promote bowel peristalsis  Continue PRN anti-emetics (ondansetron, prochlorperazine, promethazine)   Olanzapine increased to BID     Adjustment disorder with depressed/anxious mood  Duloxetine 20 mg PO daily started 11/6  Continue lorazepam 0.5 mg PO Q4 hours PRN anxiety  Spiritual care consult placed 11/4/19, with patient permission, for social visit/emotional support  Recommended blinds open during the day for sunshine and leaving room/ambulating - patient not receptive  Consider psych consult for psychotherapy - will discuss with patient tomorrow     Opioid-induced constipation versus carcinomatosis ileus   Patient is now passing flatus with very small (insignificant) bowel movement 11/6/19 following bisacodyl suppository  No SBO per diagnostic 2-view abdominal x-ray 11/4/19  Increase lactulose to 15 mL PO  "or enteral TID  Continue metoclopramide 10 mg IV Q8 hours scheduled as nausea prophylaxis and to promote bowel peristalsis  Agree with bisacodyl suppository 10 mg LA daily  Change polyethylene glycol 1 packet via enteral tube to daily PRN - patient reports nausea due to volume  Continue senokot 8.6-50 mg 2 tabs via enteral tube BID  Milk of Magnesia 30 mL via enteral tube daily PRN  Consider steroids in the coming days and or methylnaltrexone if no bowel movement with aggressive bowel regimen considering diagnosis of carcinomatosis  Patient still having high output from PINO drain     Insomnia  Discussed sleep hygiene including having blinds open during the day - patient not receptive  Continue zolpidem 5-10 mg PO QHS PRN insomnia per primary team    Thrush  Recommended oral care and nystatin   Patient stated \"I don't have thrush.  I drink plenty of water.\"   Provided oral care education and causes of thrush  He is open to me reevaluating tomorrow     Code Status: Full     Advance Care Planning/Current Goals of Care: Code status discussed 11/5/19 by Taya WEEMS and was given blank AD packet along with schedule of [facility] Advance Directive Workshops for community members.  Patient confirmed today he would like time to review with his family before completing.  Emphasized our team is happy to assist with AD completion, when patient is ready.  I also expressed that I am available to talk with him about his feelings of depression/hopelessness related to his diagnosis to help him process his feelings at any time.  Normalized/validated his feelings/thoughts.     30 minutes spent with greater than 50% spent counseling and coordinating the patient's care regarding symptom management.   Please refer to HPI and assessment/plan for details.     Thank you for allowing the palliative care team to participate in Tima's care. Please call with any questions/needs.     VICKIE Alvarado.  Palliative Care Nurse " [facility]  [phone]

## 2019-11-08 NOTE — PROGRESS NOTES
Palliative Care   Spoke with patient's primary team, still no bowel movement.  + flatus.  Receiving feeding through J-tube.  Some medications documented as held/refused based on nausea.    Rounded with Dr. Tracey - internal medicine MD. Patient resting in bed, eyes open but appeared drowsy and reported feeling tired.  Agreed to return later after patient wakes up more.  Patient agreeable to having morning dose of olanzapine added.  Ollanzabine 2.5 mg PO increased to BID.  Will [place] back with patient later today.     VICKIE Alvarado.  Palliative Care Nurse Practitioner  [phone]

## 2019-11-08 NOTE — PROGRESS NOTES
[facility]     Attending: Cliff Sepulveda M.D.  Senior Resident: Jonas Vallejo M.D. (PGY-3)  Jose Luis Resident: Niko Whittington M.D. (PGY-1)  PATIENT: Tima Rizzo; [id number]; 1970    ID: 49 y.o. male admitted for G-tube placement secondary to gastric adenocarcinoma with carcinoid pneumatosis.    Overnight Events: Overnight tube feeds were decreased from 90 mL/h to 45 mL/h secondary to abdominal discomfort.    Subjective: Patient resting comfortably.  Reports abdominal cramping that is mild.  Was nauseated earlier, but currently denies any nausea or episodes of emesis.  Patient continues to pass flatus.  Has not had a bowel movement since operation.  Denies any fevers, chills, sweats, headache, chest pain, shortness of breath, or dysuria.    OBJECTIVE:  Temp:  [36.3 °C (97.3 °F)-37.3 °C (99.2 °F)] 37.3 °C (99.2 °F)  Pulse:  [69-82] 82  Resp:  [16-18] 18  BP: (118-140)/(77-98) 140/98  SpO2:  [96 %-97 %] 96 %    Intake/Output Summary (Last 24 hours) at 11/8/2019 0651  Last data filed at 11/8/2019 0531  Gross per 24 hour   Intake 2265 ml   Output 2810 ml   Net -545 ml       PE:  Gen: No Acute Distress   HEENT: NCAT   Pulm: CTABL, no respiratory distress   Cardio: NS1S2 NMRG   Abdom: Port sites clean dry and intact.  No erythema or drainage.  J-tube and PINO with clean dry dressing in place.  Bowel sounds present.  Mild suprapubic tenderness.  No rebound or guarding.  Ext: No edema, 2+ pulses     LABS:  No results for input(s): WBC, RBC, HEMOGLOBIN, HEMATOCRIT, MCV, MCH, RDW, PLATELETCT, MPV, NEUTSPOLYS, LYMPHOCYTES, MONOCYTES, EOSINOPHILS, BASOPHILS, RBCMORPHOLO in the last 72 hours.  No results for input(s): SODIUM, POTASSIUM, CHLORIDE, CO2, BUN, CREATININE, CALCIUM, MAGNESIUM, PHOSPHORUS, ALBUMIN in the last 72 hours.  Estimated GFR/CRCL = Estimated Creatinine Clearance: 104.8 mL/min (by C-G formula based on SCr of 0.69 mg/dL).  No results for input(s): GLUCOSE, POCGLUCOSE in the last 72  hours.              No results for input(s): INR, APTT, FIBRINOGEN in the last 72 hours.    Invalid input(s): DIMER    MICROBIOLOGY:  No results found for: BLOODCULTU, BLDCULT, BCHOLD     IMAGING:   SM-[id number]-4 VIEWS   Final Result      Contrast opacifies distal small bowel and the colon as evidence against obstruction.      CT-CHEST,ABDOMEN,PELVIS WITH   Final Result      No evidence of thoracic metastases.      Interval placement of J-tube.      Apparent persistent gastric mass.      New mild bilateral hydronephrosis.      DX-G.I. TUBE INJECTION, ANY TYPE   Final Result      Enterostomy tube terminates in the jejunum and no contrast extravasation is identified          MEDS:  Current Facility-Administered Medications   Medication Last Dose   • calcium carbonate (TUMS) chewable tab 500 mg 500 mg at 11/07/19 0554   • simethicone (MYLICON) chewable tab 80 mg 80 mg at 11/08/19 0526   • bisacodyl (DULCOLAX) suppository 10 mg 10 mg at 11/08/19 0537    And   • senna-docusate (PERICOLACE or SENOKOT S) 8.6-50 MG per tablet 2 Tab 2 Tab at 11/08/19 0536    And   • polyethylene glycol/lytes (MIRALAX) PACKET 1 Packet 1 Packet at 11/07/19 1722    And   • magnesium hydroxide (MILK OF MAGNESIA) suspension 30 mL     • DULoxetine (CYMBALTA) capsule 20 mg 20 mg at 11/08/19 0537   • oxyCODONE immediate release (ROXICODONE) tablet 10-15 mg 10 mg at 11/08/19 0532   • amLODIPine (NORVASC) tablet 10 mg 10 mg at 11/08/19 0536   • NS infusion     • metoclopramide (REGLAN) injection 10 mg 10 mg at 11/08/19 0533   • lisinopril (PRINIVIL) tablet 20 mg 20 mg at 11/08/19 0536   • labetalol (NORMODYNE,TRANDATE) injection 10 mg 10 mg at 11/05/19 0919   • oxyCODONE CR (OXYCONTIN) tablet 20 mg 20 mg at 11/08/19 0532   • HYDROmorphone pf (DILAUDID) injection 0.5 mg 0.5 mg at 11/07/19 1531   • OLANZapine (ZYPREXA) tablet 2.5 mg 2.5 mg at 11/07/19 2044   • heparin injection 5,000 Units 5,000 Units at 11/08/19 0536   • zolpidem (AMBIEN) tablet 5-10 mg  5 mg at 11/07/19 2044   • LORazepam (ATIVAN) tablet 0.5 mg 0.5 mg at 11/03/19 0348   • Pharmacy Consult: Enteral tube insertion - review meds/change route/product selection     • acetaminophen (TYLENOL) tablet 650 mg 650 mg at 11/02/19 0405   • ondansetron (ZOFRAN ODT) dispertab 8 mg 8 mg at 11/08/19 0532   • prochlorperazine (COMPAZINE) tablet 10 mg 10 mg at 11/05/19 1959   • promethazine (PHENERGAN) tablet 12.5 mg 12.5 mg at 11/03/19 1611   • tamsulosin (FLOMAX) capsule 0.4 mg 0.4 mg at 11/07/19 0927   • omeprazole (PRILOSEC) capsule 40 mg 40 mg at 11/08/19 0534   • prochlorperazine (COMPAZINE) injection 10 mg 10 mg at 11/08/19 0526       ASSESSMENT/PLAN: 49 y.o. male admitted for J-tube placement 2/2 gastric cancer. POD#6 J-tube placemen.     # Nausea/emesis, improved.  # Gastric  adenocarcinoma with carcinomatosis, biopsy-proven  # J-tube placement, POD#4  -PINO drain continues to produce about 700 mL/day.  -CT chest abdomen pelvis revealed no mets to the chest.  Redemonstrated gastric mass.  And no other masses noted.  - Palliative care on board for advanced goals of care planning.    -Tolerating tube feeds.  First full nocturnal feed overnight.  - Continue:   Oxy 20 mg PO BiD  Jenn 10-15 mg PO PRN  Dilaudid 0.5 mg IV PRN  Olanzapine 2.5 mg PO BiD for appetite stimulation and nausea suppression       #Constipation secondary to chronic opioid use complicated by carcinomatous and J-tube placement.  - 2 view x-ray abdomen with contrast showed no obstruction  - Bowel prep per palliative   -We will add lactulose today.     #BPH  - Continue:  Flomax 0.4 mg PO daily     #Insomnia   -Zolpedem 5-10mg QHS (hold for sedation)     # Iron deficiency anemia  - Will consider supplementation after feeds reach goal.      Dispo:  Home with home health once stooling.     #Core Measures   VTE PPx: heparin  Abx:N/A  Lines/Tubes:perfieral IV  Fluids:NS @ 100 mL/hr  Diet: NPO until after leak test  Code Status: Full code     Niko  BLANCHE Whittington.   PGY-1  [facility]   [phone]

## 2019-11-08 NOTE — DISCHARGE PLANNING
Agency/Facility Name: [facility]  Spoke To: Lucretia   Outcome: If patient d/c over the weekend, can call the main line and speak with the pharmacist on-call to coordinate delivery.     RN MILDRED Thomas notified.

## 2019-11-08 NOTE — PROGRESS NOTES
Assumed care of pt.  AAOx4.  Rating pain at 5/10, medicated per MAR.  Surgical lap sites GERMANIA CDI.  RLQ PINO drain with serous output noted, high output at this time.  J tube RLQ, clamped, flushed per order.  Full liquid diet in place, pt states low to no appetite this morning. No c/o n/v.  NOC tube feedings in place.  LBM 11/6, - flatus per pt, + void.  POC reviewed with pt.  Call light within reach, pt educated to call for assistance as needed.   Hourly rounding in place.

## 2019-11-09 ENCOUNTER — APPOINTMENT (OUTPATIENT)
Dept: ONCOLOGY | Facility: MEDICAL CENTER | Age: 49
End: 2019-11-09
Attending: INTERNAL MEDICINE
Payer: MEDICARE

## 2019-11-09 VITALS
TEMPERATURE: 97.5 F | HEIGHT: 67 IN | OXYGEN SATURATION: 98 % | DIASTOLIC BLOOD PRESSURE: 82 MMHG | BODY MASS INDEX: 19.79 KG/M2 | RESPIRATION RATE: 17 BRPM | HEART RATE: 83 BPM | SYSTOLIC BLOOD PRESSURE: 130 MMHG | WEIGHT: 126.1 LBS

## 2019-11-09 PROCEDURE — 99233 SBSQ HOSP IP/OBS HIGH 50: CPT | Performed by: NURSE PRACTITIONER

## 2019-11-09 PROCEDURE — 700102 HCHG RX REV CODE 250 W/ 637 OVERRIDE(OP): Performed by: STUDENT IN AN ORGANIZED HEALTH CARE EDUCATION/TRAINING PROGRAM

## 2019-11-09 PROCEDURE — A9270 NON-COVERED ITEM OR SERVICE: HCPCS | Performed by: INTERNAL MEDICINE

## 2019-11-09 PROCEDURE — 700111 HCHG RX REV CODE 636 W/ 250 OVERRIDE (IP): Performed by: INTERNAL MEDICINE

## 2019-11-09 PROCEDURE — A9270 NON-COVERED ITEM OR SERVICE: HCPCS | Performed by: NURSE PRACTITIONER

## 2019-11-09 PROCEDURE — 700111 HCHG RX REV CODE 636 W/ 250 OVERRIDE (IP): Performed by: STUDENT IN AN ORGANIZED HEALTH CARE EDUCATION/TRAINING PROGRAM

## 2019-11-09 PROCEDURE — A9270 NON-COVERED ITEM OR SERVICE: HCPCS | Performed by: STUDENT IN AN ORGANIZED HEALTH CARE EDUCATION/TRAINING PROGRAM

## 2019-11-09 PROCEDURE — 700102 HCHG RX REV CODE 250 W/ 637 OVERRIDE(OP): Performed by: NURSE PRACTITIONER

## 2019-11-09 PROCEDURE — 700102 HCHG RX REV CODE 250 W/ 637 OVERRIDE(OP): Performed by: INTERNAL MEDICINE

## 2019-11-09 PROCEDURE — 700105 HCHG RX REV CODE 258: Performed by: STUDENT IN AN ORGANIZED HEALTH CARE EDUCATION/TRAINING PROGRAM

## 2019-11-09 RX ORDER — OLANZAPINE 2.5 MG/1
2.5 TABLET, FILM COATED ORAL 2 TIMES DAILY
Qty: 30 TAB | Refills: 1 | Status: SHIPPED | OUTPATIENT
Start: 2019-11-09 | End: 2019-12-10

## 2019-11-09 RX ORDER — SIMETHICONE 80 MG
80 TABLET,CHEWABLE ORAL 3 TIMES DAILY PRN
Qty: 30 TAB | Refills: 3 | Status: SHIPPED | OUTPATIENT
Start: 2019-11-09 | End: 2019-12-10

## 2019-11-09 RX ORDER — TAMSULOSIN HYDROCHLORIDE 0.4 MG/1
0.4 CAPSULE ORAL
Qty: 30 CAP | Refills: 1 | Status: SHIPPED | OUTPATIENT
Start: 2019-11-10 | End: 2019-12-10

## 2019-11-09 RX ORDER — ONDANSETRON 8 MG/1
8 TABLET, ORALLY DISINTEGRATING ORAL EVERY 8 HOURS PRN
Qty: 10 TAB | Refills: 0 | Status: SHIPPED | OUTPATIENT
Start: 2019-11-09 | End: 2019-12-10

## 2019-11-09 RX ORDER — LACTULOSE 20 G/30ML
15 SOLUTION ORAL 3 TIMES DAILY PRN
Qty: 1 BOTTLE | Refills: 1 | Status: SHIPPED | OUTPATIENT
Start: 2019-11-09 | End: 2019-12-10

## 2019-11-09 RX ORDER — POLYETHYLENE GLYCOL 3350 17 G/17G
17 POWDER, FOR SOLUTION ORAL
Qty: 15 EACH | Refills: 3 | Status: SHIPPED | OUTPATIENT
Start: 2019-11-09 | End: 2019-11-24

## 2019-11-09 RX ORDER — METOCLOPRAMIDE 10 MG/1
10 TABLET ORAL
Qty: 120 TAB | Refills: 1 | Status: SHIPPED | OUTPATIENT
Start: 2019-11-09 | End: 2019-12-10

## 2019-11-09 RX ORDER — AMOXICILLIN 250 MG
2 CAPSULE ORAL 2 TIMES DAILY PRN
Qty: 60 TAB | Refills: 1 | Status: SHIPPED | OUTPATIENT
Start: 2019-11-09 | End: 2019-12-10

## 2019-11-09 RX ORDER — NALOXONE HYDROCHLORIDE 4 MG/.1ML
1 SPRAY NASAL
Qty: 1 PACKAGE | Refills: 0 | Status: SHIPPED | OUTPATIENT
Start: 2019-11-09

## 2019-11-09 RX ORDER — OXYCODONE HYDROCHLORIDE 10 MG/1
10-15 TABLET ORAL
Qty: 28 TAB | Refills: 0 | Status: SHIPPED | OUTPATIENT
Start: 2019-11-09 | End: 2019-12-09

## 2019-11-09 RX ORDER — OXYCODONE HCL 20 MG/1
20 TABLET, FILM COATED, EXTENDED RELEASE ORAL EVERY 12 HOURS
Qty: 60 TAB | Refills: 0 | Status: SHIPPED | OUTPATIENT
Start: 2019-11-09 | End: 2019-12-09

## 2019-11-09 RX ORDER — BISACODYL 10 MG
10 SUPPOSITORY, RECTAL RECTAL PRN
Qty: 10 SUPPOSITORY | Refills: 0 | Status: SHIPPED | OUTPATIENT
Start: 2019-11-09 | End: 2019-12-10

## 2019-11-09 RX ORDER — METOCLOPRAMIDE 10 MG/1
10 TABLET ORAL
Status: DISCONTINUED | OUTPATIENT
Start: 2019-11-09 | End: 2019-11-09 | Stop reason: HOSPADM

## 2019-11-09 RX ORDER — PROCHLORPERAZINE MALEATE 10 MG
10 TABLET ORAL EVERY 6 HOURS PRN
Qty: 30 TAB | Refills: 3 | Status: SHIPPED | OUTPATIENT
Start: 2019-11-09

## 2019-11-09 RX ORDER — LISINOPRIL 20 MG/1
20 TABLET ORAL DAILY
Qty: 30 TAB | Refills: 0 | Status: SHIPPED | OUTPATIENT
Start: 2019-11-10 | End: 2019-12-10

## 2019-11-09 RX ORDER — AMLODIPINE BESYLATE 10 MG/1
10 TABLET ORAL DAILY
Qty: 30 TAB | Refills: 0 | Status: SHIPPED | OUTPATIENT
Start: 2019-11-10 | End: 2019-12-10

## 2019-11-09 RX ORDER — DULOXETIN HYDROCHLORIDE 20 MG/1
20 CAPSULE, DELAYED RELEASE ORAL DAILY
Qty: 30 CAP | Refills: 1 | Status: SHIPPED | OUTPATIENT
Start: 2019-11-10 | End: 2019-12-10

## 2019-11-09 RX ORDER — OMEPRAZOLE 40 MG/1
40 CAPSULE, DELAYED RELEASE ORAL DAILY
Qty: 30 CAP | Refills: 1 | Status: SHIPPED | OUTPATIENT
Start: 2019-11-10

## 2019-11-09 RX ADMIN — DULOXETINE HYDROCHLORIDE 20 MG: 20 CAPSULE, DELAYED RELEASE ORAL at 04:57

## 2019-11-09 RX ADMIN — OXYCODONE HYDROCHLORIDE 20 MG: 20 TABLET, FILM COATED, EXTENDED RELEASE ORAL at 04:57

## 2019-11-09 RX ADMIN — TAMSULOSIN HYDROCHLORIDE 0.4 MG: 0.4 CAPSULE ORAL at 07:38

## 2019-11-09 RX ADMIN — METOCLOPRAMIDE 10 MG: 5 INJECTION, SOLUTION INTRAMUSCULAR; INTRAVENOUS at 05:00

## 2019-11-09 RX ADMIN — LACTULOSE 15 ML: 20 SOLUTION ORAL at 06:42

## 2019-11-09 RX ADMIN — SENNOSIDES AND DOCUSATE SODIUM 2 TABLET: 8.6; 5 TABLET ORAL at 04:57

## 2019-11-09 RX ADMIN — AMLODIPINE BESYLATE 10 MG: 10 TABLET ORAL at 04:57

## 2019-11-09 RX ADMIN — HEPARIN SODIUM 5000 UNITS: 5000 INJECTION, SOLUTION INTRAVENOUS; SUBCUTANEOUS at 05:00

## 2019-11-09 RX ADMIN — OMEPRAZOLE 40 MG: 20 CAPSULE, DELAYED RELEASE ORAL at 04:56

## 2019-11-09 RX ADMIN — METOCLOPRAMIDE HYDROCHLORIDE 10 MG: 10 TABLET ORAL at 12:13

## 2019-11-09 RX ADMIN — LACTULOSE 15 ML: 20 SOLUTION ORAL at 12:13

## 2019-11-09 RX ADMIN — OXYCODONE HYDROCHLORIDE 15 MG: 10 TABLET ORAL at 04:57

## 2019-11-09 RX ADMIN — SODIUM CHLORIDE: 9 INJECTION, SOLUTION INTRAVENOUS at 05:02

## 2019-11-09 RX ADMIN — OXYCODONE HYDROCHLORIDE 15 MG: 10 TABLET ORAL at 07:51

## 2019-11-09 RX ADMIN — OXYCODONE HYDROCHLORIDE 15 MG: 10 TABLET ORAL at 12:13

## 2019-11-09 RX ADMIN — LISINOPRIL 20 MG: 20 TABLET ORAL at 04:57

## 2019-11-09 RX ADMIN — OLANZAPINE 2.5 MG: 5 TABLET, FILM COATED ORAL at 05:01

## 2019-11-09 NOTE — DISCHARGE SUMMARY
"  [facility] FAMILY MEDICINE ADULT DISCHARGE SUMMARY     Admit Date:  10/29/2019       Discharge Date:   11/9/2019    Service:   [facility] Family Medicine Inpatient Team  Attending Physician(s):   Cliff Sepulveda M.D.       Senior Resident(s):   Jonas Strickland M.D.  Jose Luis Resident(s):   Niko Whittington M.D.    Primary Diagnosis:     #Gastric adenocarcinoma with carcinomatosis    Secondary Diagnoses:                  #Nausea/emesis secondary to primary diagnosis    #Hypertension, untreated    #Opioid induced constipation    #BPH    #Insomnia    #Iron deficiency anemia    HPI:   Per admission H&P: \"49 y.o. male with past medical history of gastric cancer who presented to the hospital 10/29/2019 as a direct admission from Dr. Simpson due to dehydration and placement of J-tube.  Patient reported that he has been having abdominal pain which is located in upper abdomen and he rated his pain 2-3/10 in intensity. It aggravated by food and alleviated with pain medication. No specific radiation of this pain. His pain is associated with nausea and vomiting.  He has been taking several nausea medication in order to avoid nausea and vomiting. He has been taking pain medication for his epigastric pain.  He also reported that he has now been eating and drinking properly due to ongoing nausea and abdominal pain.  He has been following oncology and Dr. Keyes is his oncologist.  He denies any symptoms of chest pain and acute shortness of breath. He reported that he has a history of BPH and he did not take his medication due to financial reason and after discussion he agreed to take medication for BPH. He denies any other complaints.\"     Hospital Summary (Brief Narrative):        Patient admitted admitted to the family medicine team.  Continues to complain of nausea and emesis during the first hospital day.  Pain was poorly controlled.  Hospital day 2 patient was taken for J-tube placement without complications.  Small amount of ascites " was noted during procedure and PINO drain was left in place.  Also during procedure the surgeon noted likely metastasis to the mesentery and peritoneal lining.  Biopsy was taken and sent to pathology.  Patient tolerated procedure well, and the following day leak test was done showing no leakage contrast into the abdomen.  CT chest abdomen pelvis again demonstrated gastric mass, but showed no mets to the lungs, or abnormalities elsewhere.  Patient was started on tube feeds 10 mL/h with plan to increase by 25 mL every 4 hours until goal reach, and then transitioning to nocturnal feeds.  Over the following days patient failed to tolerate goal feeds with increasing abdominal bloating and nausea.  Patient's pain continued to be poorly controlled.  Palliative care was consulted and patient transition to oxycodone in XR 20 mg p.o. twice daily with Jenn codeine 10 to 15 mg p.o. every 3 hours as needed.  P.o. morphine was discontinued.  Patient's pain was well controlled with this new regimen and he had improved significantly.  Patient was transitioned to impact peptide with goal 90 mL/h, and transition to nocturnal tube feeds hospital day 9 and tolerated goal feeds for total 12 hours.  Hospital day 10 patient again felt bloated and tube feeds were decreased to 45 mL/h.  Of note, patient reported his last bowel movement was the day of admission and bowel regimen was escalated in the days postop with patient finally having large bowel movement hospital day 11.  At that point the patient felt well and ready for discharge.  Patient had nocturnal tube feed supplies bedside with home health arranged.  Patient was given discharge prescriptions and discharge under the assumption that if medications, including narcotics, or not covered by patient's insurance these medications will be covered through approved services.  At 1530 shortly after discharge as notified by case management that narcotics were not covered by approved services.   After speaking with manager I was informed that the patient's oxycodone XR 20 mg but not be covered.  After discussion with the patient's mother, Dr. Ramirez hand-delivered prescription for oxycodone 20 mg IR p.o. twice daily to the patient's pharmacy.  Spoke with Medicaid and prior authorization in process for patient's extended release oxycodone.    Patient /Hospital Summary (Details -- Problem Oriented) :          #Gastric adenocarcinoma with carcinomatosis: Biopsy during J-tube placement revealed metastatic gastric carcinoma.  Per oncologist Dr. Keyes, specimen sent for her to testing.  Patient to follow-up Wednesday 11/13 for continued management.  Patient discharged with the assumption his medications would be covered under approved services per case management, but per [facility] policy this was denied.  Prescription for oxycodone IR 20 mg p.o. twice daily hand-delivered to pharmacy.  Spoke with Medicaid and prior authorization for oxycodone ER 20 mg p.o. twice daily in process.  Discussed with patient and patient's family.  Patient family agreeable and understands that if there are any needs the family medicine resident on-call with gladly assist any time.    #Nausea/emesis secondary to primary diagnosis: Patient well controlled with Zofran 4 mg as needed and Compazine on occasion.  Patient started on olanzapine 2.5 mg p.o. daily and increase to twice daily for nausea suppression and appetite stimulation.  Patient symptomatically improved after bowel movement the day of discharge.    #Hypertension, untreated: Started patient on lisinopril 20 mg p.o. daily, and added amlodipine 10 mg p.o. daily with blood pressures improved and stable at time of discharge.    #Opioid induced constipation: Postop day 4 patient given methylnaltrexone 8 mg IM with little response.  Palliative care consulted and started patient on scheduled docusate and MiraLAX with as needed suppository.  Patient refused suppositories on multiple  occasions.  Also refused manual disimpaction.  X-ray of the abdomen showed contrast in the sigmoid colon excluding diagnosis of obstruction.  Patient was started on lactulose 1 day prior to discharge 10 mg p.o. 3 times daily.  On the day of discharge patient had lab large bowel movement with symptomatic improvement.  Patient was eager for discharge.    #BPH: Patient continued on Flomax 0.4 mg p.o. daily with no complaints.    #Insomnia: Patient received Ambien 5 to 10 mg as needed during first week of hospitalization on occasion, but sleep improved after pain was better controlled and patient did not require PRN Ambien in the days prior to discharge.    #Iron deficiency anemia: Supplementation held secondary to patient's GI complaints.  Patient to follow-up outpatient and consider supplementation at a later date.        Consultants:      General surgery    Oncology    Palliative care    Procedures:        Laparoscopic J-tube placement with PINO drain.    Abdominal peritoneal biopsy    Imaging/ Testing:      [id number]   Final Result      Contrast opacifies distal small bowel and the colon as evidence against obstruction.      CT-CHEST,ABDOMEN,PELVIS WITH   Final Result      No evidence of thoracic metastases.      Interval placement of J-tube.      Apparent persistent gastric mass.      New mild bilateral hydronephrosis.      DX-G.I. TUBE INJECTION, ANY TYPE   Final Result      Enterostomy tube terminates in the jejunum and no contrast extravasation is identified          Discharge Medications:         Tima Rizzo   Home Medication Instructions JEANNA:[id number]    Printed on:11/09/19 1533   Medication Information                      amLODIPine (NORVASC) 10 MG Tab  Take 1 Tab by mouth every day for 30 days.             bisacodyl (DULCOLAX) 10 MG Suppos  Insert 1 Suppository in rectum as needed (costipation).             DULoxetine (CYMBALTA) 20 MG Cap DR Particles  Take 1 Cap by mouth every day.              lactulose 20 GM/30ML Solution  Take 15 mL by mouth 3 times a day as needed (costipation).             lisinopril (PRINIVIL) 20 MG Tab  Take 1 Tab by mouth every day.             LORazepam (ATIVAN) 0.5 MG Tab  Take 1 Tab by mouth 2 times a day as needed.             magnesium hydroxide (MILK OF MAGNESIA) 400 MG/5ML Suspension  Take 30 mL by mouth 1 time daily as needed (if polyethylene glycol ineffective after 24 hours).             metoclopramide (REGLAN) 10 MG Tab  Take 1 Tab by mouth 3 times a day before meals.             Naloxone (NARCAN) 4 MG/0.1ML Liquid  Spray 4 mg in nose Once PRN (For opiod overdose symptoms.) for up to 1 dose.             OLANZapine (ZYPREXA) 2.5 MG Tab  Take 1 Tab by mouth 2 Times a Day.             omeprazole (PRILOSEC) 40 MG delayed-release capsule  Take 1 Cap by mouth every day.             ondansetron (ZOFRAN ODT) 8 MG TABLET DISPERSIBLE  Take 1 Tab by mouth every 8 hours as needed.             oxyCODONE CR (OXYCONTIN) 20 MG Tablet Extended Release 12 hour Abuse-Deterrent  Take 1 Tab by mouth every 12 hours for 30 days.             oxyCODONE immediate release (ROXICODONE) 10 MG immediate release tablet  Take 1-1.5 Tabs by mouth every 3 hours as needed for Severe Pain or Moderate Pain for up to 30 days.             polyethylene glycol/lytes (MIRALAX) Pack  Take 1 Packet by mouth 1 time daily as needed (costipation) for up to 15 days.             prochlorperazine (COMPAZINE) 10 MG Tab  Take 1 Tab by mouth every 6 hours as needed for Nausea/Vomiting.             senna-docusate (PERICOLACE OR SENOKOT S) 8.6-50 MG Tab  Take 2 Tabs by mouth 2 times a day as needed.             simethicone (MYLICON) 80 MG Chew Tab  Take 1 Tab by mouth 3 times a day as needed (Gas).             tamsulosin (FLOMAX) 0.4 MG capsule  Take 1 Cap by mouth ONE-HALF HOUR AFTER BREAKFAST.                 Disposition: Home with home health.    Diet:   Impact peptide at 90 mL/h x 12 hours nocturnal tube feeds.  P.o.  as tolerated.    Activity:   As tolerated    Instructions:        The patient was instructed to return to the ER in the event of worsening symptoms. I have counseled the patient on the importance of compliance and the patient has agreed to proceed with all medical recommendations and follow up plan indicated above. The patient understands that all medications come with benefits and risks.  Discussed the risk of benzodiazepines and opioids in combination.  Patient reports he rarely uses his benzodiazepine medication and expresses understanding of the risk, including death.  Discussed signs signs and symptoms of opioid overdose with patient and his mother.  Both reported understanding, as well as home prescription for naloxone intranasal and understanding how to use the medication. Risks may include permanent injury or death and these risks can be minimized with close reassessment and monitoring.       Please CC: Jenae Steward M.D., Riccardo Keyes M.D., Dyllan Cisneros M.D.    Follow up appointment details :        Jenae Steward M.D., Thursday 11/14/2019    Riccardo Keyes M.D., Wednesday 11/13/2019    Dyllan Cisneros M.D. Patient needs appointment for post op follow-up on J-tube and PINO drain placed 10/31/2019.      Pending Studies:          HER-2 testing on peritoneal biopsy collected 10/31/2018

## 2019-11-09 NOTE — DISCHARGE INSTRUCTIONS
Discharge Instructions    Discharged to home by car with relative. Discharged via wheelchair, hospital escort: Yes.  Special equipment needed: Not Applicable    Be sure to schedule a follow-up appointment with your primary care doctor or any specialists as instructed.     Discharge Plan:   Smoking Cessation Offered: Patient Counseled  Influenza Vaccine Indication: Patient Refuses    I understand that a diet low in cholesterol, fat, and sodium is recommended for good health. Unless I have been given specific instructions below for another diet, I accept this instruction as my diet prescription.   Other diet: Full liquid diet, advance per MD.     Special Instructions:   Okay to discharge home with instructions to present directly to pharmacy to refill medications. If co-pay is not possible call [facility], or have pharmacy call [facility] for assistance. Return to the emergency department or call 911 if concerned.    · Is patient discharged on Warfarin / Coumadin?   No     Percutaneous Endoscopic Jejunostomy  Percutaneous endoscopic jejunostomy is a surgery to place a feeding tube in the upper part of the small intestine (jejunum). You may have this procedure if you will not be able to get enough nutrients from eating or drinking for more than 30 days, such as if you have severe brain or stomach problems or some types of cancer.  Tell a health care provider about:  · Any allergies you have.  · All medicines you are taking, including vitamins, herbs, eye drops, creams, and over-the-counter medicines.  · Any problems you or members of your family have had with anesthetic medicines.  · Any blood disorders you have.  · Any surgeries you have had.  · Any medical conditions you have.  · Whether you are pregnant or may be pregnant.  What are the risks?  Generally, this is a safe procedure. However, problems may occur, including:  · Infection.  · Bleeding.  · Damage to other structures  or organs.  · Allergic reactions to medicines.  · Leaking around the feeding tube.  · Tearing (perforation) of the jejunum  · Diarrhea.  What happens before the procedure?  Staying hydrated   Follow instructions from your health care provider about hydration, which may include:  · Up to 2 hours before the procedure - you may continue to drink clear liquids, such as water, clear fruit juice, black coffee, and plain tea.  Eating and drinking restrictions   Follow instructions from your health care provider about eating and drinking, which may include:  · 8 hours before the procedure - stop eating heavy meals or foods such as meat, fried foods, or fatty foods.  · 6 hours before the procedure - stop eating light meals or foods, such as toast or cereal.  · 6 hours before the procedure - stop drinking milk or drinks that contain milk.  · 2 hours before the procedure - stop drinking clear liquids.  Medicines   Ask your health care provider about:  · Changing or stopping your regular medicines. This is especially important if you are taking diabetes medicines or blood thinners.  · Taking medicines such as aspirin and ibuprofen. These medicines can thin your blood. Do not take these medicines before your procedure if your health care provider instructs you not to.  General instructions  · Do not use any products that contain nicotine or tobacco, such as cigarettes and e-cigarettes. If you need help quitting, ask your health care provider.  · Plan to have someone take you home from the hospital or clinic and stay with you for 24 hours.  What happens during the procedure?  · An IV tube may be inserted into one of your veins.  · You will be given one or more of the following:  ¨ A medicine to help you relax (sedative).  ¨ A medicine to numb the area (local anesthetic).  ¨ A medicine to make you fall asleep (general anesthetic).  ¨ An antibiotic medicine to prevent infection.  · A long, thin, flexible telescope (endoscope) will  be passed into your mouth, through your stomach, and into your jejunum.  · A small incision will be made in your abdomen.  · A feeding tube will be placed through the incision.  · The endoscope will be used to guide the feeding tube into the correct position.  · The feeding tube insertion site will be checked for leaking or bleeding.  · A bandage (dressing) may be placed around the feeding tube.  The procedure may vary among health care providers and hospitals.  What happens after the procedure?  · Your blood pressure, heart rate, breathing rate, and blood oxygen level will be monitored until the medicines you were given have worn off.  · You will be given supplies or told where to get supplies for feedings and for caring for your tube.  · Do not drive for 24 hours if you were given a sedative.  · You may have:  ¨ Pain and tenderness around your feeding tube.  ¨ A sore throat.  ¨ Slight drainage around your feeding tube.  This information is not intended to replace advice given to you by your health care provider. Make sure you discuss any questions you have with your health care provider.  Document Released: 12/23/2014 Document Revised: 09/29/2017 Document Reviewed: 09/29/2017  [employer] Interactive Patient Education © 2017 [employer]    Bulb Drain Home Care  A bulb drain consists of a thin rubber tube and a soft, round bulb that creates a gentle suction. The rubber tube is placed in the area where you had surgery. A bulb is attached to the end of the tube that is outside the body. The bulb drain removes excess fluid that normally builds up in a surgical wound after surgery. The color and amount of fluid will vary. Immediately after surgery, the fluid is bright red and is a little thicker than water. It may gradually change to a yellow or pink color and become more thin and water-like. When the amount decreases to about 1 or 2 tbsp in 24 hours, your health care provider will usually remove it.  DAILY CARE  · Keep  the bulb flat (compressed) at all times, except while emptying it. The flatness creates suction. You can flatten the bulb by squeezing it firmly in the middle and then closing the cap.  · Keep sites where the tube enters the skin dry and covered with a bandage (dressing).  · Secure the tube 1-2 in (2.5-5.1 cm) below the insertion sites to keep it from pulling on your stitches. The tube is stitched in place and will not slip out.  · Secure the bulb as directed by your health care provider.  · For the first 3 days after surgery, there usually is more fluid in the bulb. Empty the bulb whenever it becomes half full because the bulb does not create enough suction if it is too full. The bulb could also overflow. Write down how much fluid you remove each time you empty your drain. Add up the amount removed in 24 hours.  · Empty the bulb at the same time every day once the amount of fluid decreases and you only need to empty it once a day. Write down the amounts and the 24-hour totals to give to your health care provider. This helps your health care provider know when the tubes can be removed.  EMPTYING THE BULB DRAIN  Before emptying the bulb, get a measuring cup, a piece of paper and a pen, and wash your hands.  · Gently run your fingers down the tube (stripping) to empty any drainage from the tubing into the bulb. This may need to be done several times a day to clear the tubing of clots and tissue.  · Open the bulb cap to release suction, which causes it to inflate. Do not touch the inside of the cap.  · Gently run your fingers down the tube (stripping) to empty any drainage from the tubing into the bulb.  · Hold the cap out of the way, and pour fluid into the measuring cup.    · Squeeze the bulb to provide suction.   · Replace the cap.    · Check the tape that holds the tube to your skin. If it is becoming loose, you can remove the loose piece of tape and apply a new one. Then, pin the bulb to your shirt.    · Write  down the amount of fluid you emptied out. Write down the date and each time you emptied your bulb drain. (If there are 2 bulbs, note the amount of drainage from each bulb and keep the totals separate. Your health care provider will want to know the total amounts for each drain and which tube is draining more.)    · Flush the fluid down the toilet and wash your hands.    · Call your health care provider once you have less than 2 tbsp of fluid collecting in the bulb drain every 24 hours.  If there is drainage around the tube site, change dressings and keep the area dry. Cleanse around tube with sterile saline and place dry gauze around site. This gauze should be changed when it is soiled. If it stays clean and unsoiled, it should still be changed daily.   SEEK MEDICAL CARE IF:  · Your drainage has a bad smell or is cloudy.    · You have a fever.    · Your drainage is increasing instead of decreasing.    · Your tube fell out.    · You have redness or swelling around the tube site.    · You have drainage from a surgical wound.    · Your bulb drain will not stay flat after you empty it.    MAKE SURE YOU:   · Understand these instructions.  · Will watch your condition.  · Will get help right away if you are not doing well or get worse.     This information is not intended to replace advice given to you by your health care provider. Make sure you discuss any questions you have with your health care provider.     Document Released: 12/15/2001 Document Revised: 01/08/2016 Document Reviewed: 05/22/2013  [employer] Interactive Patient Education ©2016 [employer]    Depression / Suicide Risk    As you are discharged from this [facility] facility, it is important to learn how to keep safe from harming yourself.    Recognize the warning signs:  · Abrupt changes in personality, positive or negative- including increase in energy   · Giving away possessions  · Change in eating patterns- significant weight changes-  positive or  negative  · Change in sleeping patterns- unable to sleep or sleeping all the time   · Unwillingness or inability to communicate  · Depression  · Unusual sadness, discouragement and loneliness  · Talk of wanting to die  · Neglect of personal appearance   · Rebelliousness- reckless behavior  · Withdrawal from people/activities they love  · Confusion- inability to concentrate     If you or a loved one observes any of these behaviors or has concerns about self-harm, here's what you can do:  · Talk about it- your feelings and reasons for harming yourself  · Remove any means that you might use to hurt yourself (examples: pills, rope, extension cords, firearm)  · Get professional help from the community (Mental Health, Substance Abuse, psychological counseling)  · Do not be alone:Call your Safe Contact- someone whom you trust who will be there for you.  · Call your local CRISIS HOTLINE 251-3380 or 643-195-6163  · Call your local Children's Mobile Crisis Response Team [place] (106) 816-4137 or www.Mobile Tracing Services  · Call the toll free National Suicide Prevention Hotlines   · National Suicide Prevention Lifeline 827-234-KUDA (1316)  · National Hope Line Network 800-SUICIDE (350-9605)

## 2019-11-09 NOTE — PROGRESS NOTES
Received order for discharge.  Discharge instructions reviewed with pt.  Pt educated on home care of J tube and PINO drain at home.  Pt able to teach back care to this RN.   Home health also seeing pt outpatient for services.   All questions answered.  PIV removed.  All belongings gathered.  Pt escorted off unit with staff to discharge home with mother.

## 2019-11-09 NOTE — DISCHARGE PLANNING
This RN CM spoke to [employer] at [facility] to talk to representative/pharmacist to request pt's pump and tube feeds delivered to pt' home . Waiting for call back from [facility].

## 2019-11-09 NOTE — PROGRESS NOTES
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  Patient reports he had a large painful bowel movement that started off hard and finished of more soft \"it felt like I was pooping dust at first.\"  He reports associated diaphoresis and abdominal cramping that was relieved after bowel movement.  Currently he denies pain or nausea.  He is in good spirits and eager to discharge home.       Review of Systems:  Negative for dyspnea. Negative for pain. Positive for anorexia. Negative for sedation. Positive for depression. Positive for insomnia. Negative for diarrhea. Negative for nausea and vomiting. Negative for constipation. Negative for dysphagia. Negative for odynophagia.      Physical Exam:    Recent vital signs  Temp (24hrs), Av.8 °C (98.2 °F), Min:36.3 °C (97.4 °F), Max:37.2 °C (98.9 °F)  Temperature: 36.4 °C (97.5 °F)  Pulse  Av.4  Min: 60  Max: 100   Blood Pressure: 130/82       Physical Exam   Constitutional: He is oriented to person, place, and time. He appears cachectic. He is cooperative. He appears ill.   HENT:   Mouth/Throat: Oropharyngeal exudate (brown/black coating of tongue) present.   Cardiovascular: Normal rate and normal heart sounds.   Pulmonary/Chest: Effort normal. No respiratory distress. He has decreased breath sounds (throughout).   Abdominal: Soft. Bowel sounds are normal. He exhibits distension (very mild). There is no tenderness.       Musculoskeletal:         General: No edema.   Neurological: He is alert and oriented to person, place, and time.   Skin: Skin is warm and dry. There is pallor.   Psychiatric: He has a " normal mood and affect. His speech is normal and behavior is normal. Judgment and thought content normal. Cognition and memory are normal.   Nursing note and vitals reviewed.    Medications reviewed.     Problem List:  1.  Cancer-related abdominal pain (active/controlled)  2.  Acute post-op pain d/t J-tube placement 10/31/19 (resolved)  3.  Metastatic gastric adenocarcinoma (active)  4.  Cachexia/malnutrition (active)  5.  Nausea with intermittent vomiting (active/controlled)  6.  Adjustment disorder with depressed/anxious mood (active/controlled)  7.  Opioid-induced constipation versus carcinomatosis ileus (resolved)  8.  Insomnia (active)  9.  Anemia (active) - transfusion parameters in place; primary team monitoring  10. Hyponatremia (active) - repletion per primary team  11. BPH (chronic) - on tamsulosin per primary team  12. Oral thrush (active)    Assessment/Plan with shared decision making:  Cancer-related abdominal pain  MEDD (morphine equivalent daily dose) is approximately 150 mg:  - OxyContin 20 mg x 2 doses = 40 mg x 1.5 ~ 60 mg MEDD  - oxycodone 15 mg x 4 doses = 60 mg x 1.5 ~ 90 mg MEDD  - hydromorphone 0.5 mg IV x 1 doses = 0.5 mg x 20 ~ 10 mg MEDD    11/8/19 ~ 95 mg MEDD  11/7/19 ~ 160 mg MEDD   11/6/19 ~ 105 mg MEDD  11/5/19 ~ 105 mg MEDD  11/4/19 ~ 120 mg MEDD  11/3/19 ~ 135 mg MEDD     LONG-ACTING OPIOID  Continue OxyContin 20 mg PO BID  Patient reached side effect ceiling with without improved pain relief (increased nausea, decreased appetite, weight loss, mental fogginess) with dose escalation of MS Contin to 30 mg BID  Discussed with Dr. Niko Whittington who confirmed he was aware of need for prior authorization for OxyContin; MILDRED Thomas notified 11/6/19 of need; discussed this with patient/family.       SHORT-ACTING OPIOID  Continue oxycodone 10-15 mg PO Q3 hrs PRN moderate pain (NRS 4-7)  Discontinue hydromorphone 0.5 mg IV Q2 hrs PRN severe breakthrough pain (NRS 7-10)  - has not used in 24  "hours     ADJUNCT  Patient reports he was on gabapentin in 2011 s/p back surgery, but felt \"it didn't help\" and \"didn't do anything\"  Continue duloxetine 20 mg PO daily for dual effect of neuropathic pain treatment and depression   Continue acetaminophen 650 mg PO Q6 hrs PRN mild pain (NRS 1-3)    NON-PHARMACOLOGIC  Patient ambulating out of room daily    Placed visual cue sign above patient's bed for nursing to administer medications/fluids at slow/gravity pace through J-tube as there is no reservoir and placed nursing communication order; extensive education done with patient 11/8/19     Acute post-op pain d/t J-tube placement 10/31/19  See pain management strategies above     Metastatic gastric adenocarcinoma  Diagnosed May 2019 after upper GI endoscopy  S/p 5 cycles of FLOT chemotherapy (Dr. Keyes)  - pt tolerated first 3 cycles; last cycle completed early October   - was hospitalized after 4th cycle with severe diarrhea  Omental metastasis/carcinomatosis found during 10/31/19 J-tube surgery  Pending HER-2/lyle testing and NexGen sequencing on biopsy specimen  Patient has outpatient appointment with oncologist Dr. Keyes 11/13/19 @ 3 PM  - per Dr. Keyes, further management will depend on test results    Cachexia/malnutrition and nausea  J-tube placed by Dr. Cisneros 10/31/19  Impact Peptide currently 45 mL/hr with goal of 90 mL/hr - patient refusing rate increase related to adverse symptoms  Encourage small, frequent meals, and PO intake with ample fluids; discussed switching/mixing Gatorade/water; patient reports not tolerating other forms of electrolytes  Continue olanzapine 2.5 mg PO BID for nausea prophylaxis and appetite adjunct, mood/sleep/pain modulation adjunct       Nausea  Metoclopramide 10 mg to PO TID AC nausea prophylaxis and to promote bowel peristalsis  Continue PRN anti-emetics (ondansetron, prochlorperazine, promethazine)  Continue olanzapine 2.5 mg PO BID     Adjustment disorder with " "depressed/anxious mood  Duloxetine 20 mg PO daily started 11/6  Continue lorazepam 0.5 mg PO Q4 hours PRN anxiety  Spiritual care consult placed 11/4/19, with patient permission, for social visit/emotional support     Opioid-induced constipation versus carcinomatosis ileus   Extensive education done with patient and his mother to prevent OIC/ileus as below  Metoclopramide 10 mg PO TID AC  as nausea prophylaxis and to promote bowel peristalsis  Senna-docusate 8.6-50 mg 2 tabs via enteral tube BID (discussed ability to buy separately)  Lactulose 15 mL PO or via enteral tube TID (discussed changing to 1-3 times daily PRN once home)  Bisacodyl suppository 10 mg OH daily PRN  Polyethylene glycol 1 packet via enteral tube to daily PRN - patient reports nausea due to volume  Milk of Magnesia 30 mL via enteral tube daily PRN    Provided written information to patient's mother on bowel protocol    Insomnia  Sleep hygiene   Continue zolpidem 5-10 mg PO QHS PRN insomnia per primary team    Thrush  Recommended oral care (NS/baking soda rinses) and nystatin vs diflucan (patient refused)  Patient again expressed he does not have thrush   Reinforced oral care education and causes of thrush  Discussed negative consequences of thrush if untreated  Recommended he have Dr. Keyes evaluate on Wednesday     Updated patient's PCP via [employer] and oncologist via [employer] on plan of care.     Code Status: Full     Advance Care Planning/Current Goals of Care: Patient declined completion of AD this admission as he would like to talk with his son.  He does not feel his son would want to attend Advance Directive Workshop, though patient's mother stated \"he might want to?\"  Provided additional AD Workshop flyer.  Explained they can call me with any questions/needs and I am happy to answer questions.     45 minutes spent with greater than 50% spent counseling and coordinating the patient's care regarding continued symptom management once " home.   Please refer to HPI and assessment/plan for details.     Thank you for allowing the palliative care team to participate in Tima's care. Please call with any questions/needs.     VICKIE Alvarado.  Palliative Care Nurse Practitioner  [phone]

## 2019-11-09 NOTE — PROGRESS NOTES
Assumed care at 1845. Pt resting in bed. A&ox 4  Withdrawn.  Mother and son was at bedside.   O2 on RA  Ambulates independently  LLQ zuleyma x1 with moderate serous output  Jtube in place. Impact peptide running at 45ml/hr. Pt refused to increase rate  +BM tonight after lactulose. Unable to witness. moderate amount per pt  C/o some cramping. Medicated with oxy prn  Tolerated diet. Decreased appetite.   Voiding adequately  Call light within reach. Hourly rounding in place

## 2019-11-09 NOTE — CARE PLAN
Problem: Safety  Goal: Will remain free from injury  Outcome: PROGRESSING AS EXPECTED  Goal: Will remain free from falls  Outcome: PROGRESSING AS EXPECTED   Updated about POC. Reinforce call light use. Pt acknowledged understanding    Problem: Pain Management  Goal: Pain level will decrease to patient's comfort goal  Outcome: PROGRESSING AS EXPECTED  Pain controlled with oxy prn     Problem: Psychosocial Needs:  Goal: Level of anxiety will decrease  Outcome: PROGRESSING AS EXPECTED  Some anxiety. Reassurance provided.

## 2019-11-09 NOTE — DISCHARGE PLANNING
Agency/Facility Name: [facility]   Spoke To: Tammy  Outcome: Notified of patients d/c.

## 2019-11-09 NOTE — PROGRESS NOTES
Assumed care of pt.  AAOx4.  Rating pain at 6/10, medicated per MAR.  Surgical lap sites GERMANIA CDI.  RLQ PINO drain with serous output noted.  J tube RLQ, clamped, flushed per order.  Dressing at site changed this morning.   Full liquid diet in place, pt states low to no appetite this morning. No c/o n/v.  NOC tube feedings in place.  LBM last night, + flatus per pt, + void.  POC reviewed with pt.  Pt states ready to discharge home today.   Call light within reach, pt educated to call for assistance as needed.   Hourly rounding in place.

## 2019-11-09 NOTE — DISCHARGE PLANNING
This RN CM spoke with [facility] at [facility] and she states that Dr Niko Whittington can send prescriptions electronically including narcotics.    This RN CM spoke to Dr Whittington and he will send prescriptions. This RN CM faxed pt's Face Sheet to Sunni.    This RN CM also spoke to Piper Hernandez of [facility] to deliver pt's pump and tube feeds to pt's home address instead at the bedside as pt requested.     11:30am This RN CM spoke with Dr Whittington that [facility] and will not accept electronic prescriptions for Oxycodone. Per Dr Whittington he will hand over actual scripts to pt and pt can [facility] to [facility].   This RN CM spoke with [facility] that she will call me back so I can help pt with Approved Services for Medication.   15:00 [facility] called and the total cost for meds is $84.90 and with the Oxycodone ER the total cost is $437.15. I requested approval from Yarelis, Supervisor and it is beyond her price quota approval. Also learned from Yarelis that [facility] does not approve narcotics cost.   This RN CM spoke with Dr Whittington to explain this and he said he will speak to Yarelis,Supervisor for consideration since pt would need Oxycodone ER.    This RN CM also spoke to pt's mother.Monica Simin that she will present Oxycodone ER script to [facility], pt's preferred Pharmacy so they can get some Oxycodone ER today for pt's use. Pt's mother, Monica agreed to this. Pt has Medicare/Medicaid.

## 2019-11-09 NOTE — PROGRESS NOTES
[facility] [place] [facility]ESS [place]     Attending: Cliff Sepulveda M.D.  Senior Resident: Jonas Vallejo M.D. (PGY-3)  Jose Luis Resident: Niko Whittington M.D. (PGY-1)  PATIENT: Tima Rizzo; [id number]; 1970    ID: 49 y.o. male admitted for J-tube placement secondary to gastric adenocarcinoma.    Overnight Events: Overnight the patient had good sized bowel movement per patient.  He reports initially it was firm and painful, but progressed to moderately soft.  Patient reports diaphoresis during this bowel movement, with immediate improvement in abdominal bloating/pain.    Subjective: Patient awake in bed.  Reports he has some abdominal bloating and that he may be about to have another bowel movement.  Patient reports feeling much better and is eager for discharge.  Reports his mother is ready to come pick him up.  Denies any fevers, chills, sweats, his headache, dizziness, weakness, chest pain, shortness of breath, nausea, emesis, dysuria, or constipation.    OBJECTIVE:  Temp:  [36.3 °C (97.4 °F)-37.5 °C (99.5 °F)] 37.2 °C (98.9 °F)  Pulse:  [71-96] 85  Resp:  [17-18] 17  BP: (123-147)/() 125/79  SpO2:  [96 %-97 %] 97 %    Intake/Output Summary (Last 24 hours) at 11/9/2019 0715  Last data filed at 11/9/2019 0547  Gross per 24 hour   Intake 3960 ml   Output 2295 ml   Net 1665 ml       PE:  Gen: No Acute Distress   HEENT: NCAT   Pulm: CTABL, no respiratory distress   Cardio: NS1S2 NMRG   Abdom: Port sites healing well.  Clean dry and intact.  No erythema or discharge.  Dressing in place over PINO and J tube.  Nondistended, bowel sounds present, no tenderness to palpation, or rebound or guarding.  Ext: No edema, 2+ pulses     LABS:  No results for input(s): WBC, RBC, HEMOGLOBIN, HEMATOCRIT, MCV, MCH, RDW, PLATELETCT, MPV, NEUTSPOLYS, LYMPHOCYTES, MONOCYTES, EOSINOPHILS, BASOPHILS, RBCMORPHOLO in the last 72 hours.  No results for input(s): SODIUM, POTASSIUM, CHLORIDE, CO2, BUN, CREATININE, CALCIUM, MAGNESIUM,  PHOSPHORUS, ALBUMIN in the last 72 hours.  Estimated GFR/CRCL = Estimated Creatinine Clearance: 104.8 mL/min (by C-G formula based on SCr of 0.69 mg/dL).  No results for input(s): GLUCOSE, POCGLUCOSE in the last 72 hours.              No results for input(s): INR, APTT, FIBRINOGEN in the last 72 hours.    Invalid input(s): DIMER    MICROBIOLOGY:   No results found for: BLOODCULTU, BLDCULT, BCHOLD     IMAGING:   FO-ELLPUNT-3 VIEWS   Final Result      Contrast opacifies distal small bowel and the colon as evidence against obstruction.      CT-CHEST,ABDOMEN,PELVIS WITH   Final Result      No evidence of thoracic metastases.      Interval placement of J-tube.      Apparent persistent gastric mass.      New mild bilateral hydronephrosis.      DX-G.I. TUBE INJECTION, ANY TYPE   Final Result      Enterostomy tube terminates in the jejunum and no contrast extravasation is identified          MEDS:  Current Facility-Administered Medications   Medication Last Dose   • OLANZapine (ZYPREXA) tablet 2.5 mg 2.5 mg at 11/09/19 0501   • polyethylene glycol/lytes (MIRALAX) PACKET 1 Packet      And   • senna-docusate (PERICOLACE or SENOKOT S) 8.6-50 MG per tablet 2 Tab 2 Tab at 11/09/19 0457    And   • magnesium hydroxide (MILK OF MAGNESIA) suspension 30 mL      And   • bisacodyl (DULCOLAX) suppository 10 mg     • lactulose 20 GM/30ML solution 15 mL 15 mL at 11/09/19 0642   • calcium carbonate (TUMS) chewable tab 500 mg 500 mg at 11/07/19 0554   • simethicone (MYLICON) chewable tab 80 mg 80 mg at 11/08/19 0526   • DULoxetine (CYMBALTA) capsule 20 mg 20 mg at 11/09/19 0457   • oxyCODONE immediate release (ROXICODONE) tablet 10-15 mg 15 mg at 11/09/19 0457   • amLODIPine (NORVASC) tablet 10 mg 10 mg at 11/09/19 0457   • NS infusion     • metoclopramide (REGLAN) injection 10 mg 10 mg at 11/09/19 0500   • lisinopril (PRINIVIL) tablet 20 mg 20 mg at 11/09/19 0457   • labetalol (NORMODYNE,TRANDATE) injection 10 mg 10 mg at 11/05/19 0919   •  oxyCODONE CR (OXYCONTIN) tablet 20 mg 20 mg at 11/09/19 0457   • HYDROmorphone pf (DILAUDID) injection 0.5 mg 0.5 mg at 11/07/19 1531   • heparin injection 5,000 Units 5,000 Units at 11/09/19 0500   • zolpidem (AMBIEN) tablet 5-10 mg 5 mg at 11/07/19 2044   • LORazepam (ATIVAN) tablet 0.5 mg 0.5 mg at 11/03/19 0348   • Pharmacy Consult: Enteral tube insertion - review meds/change route/product selection     • acetaminophen (TYLENOL) tablet 650 mg 650 mg at 11/02/19 0405   • ondansetron (ZOFRAN ODT) dispertab 8 mg 8 mg at 11/08/19 0532   • prochlorperazine (COMPAZINE) tablet 10 mg 10 mg at 11/05/19 1959   • promethazine (PHENERGAN) tablet 12.5 mg 12.5 mg at 11/03/19 1611   • tamsulosin (FLOMAX) capsule 0.4 mg 0.4 mg at 11/08/19 0833   • omeprazole (PRILOSEC) capsule 40 mg 40 mg at 11/09/19 0456   • prochlorperazine (COMPAZINE) injection 10 mg 10 mg at 11/08/19 0526       ASSESSMENT/PLAN: 49 y.o. male admitted for G-tube placement secondary to gastric adenocarcinoma with carcinomatosis.    # Nausea/emesis, improved.  # Gastric  adenocarcinoma with carcinomatosis, biopsy-proven  # J-tube placement, POD#7  -PINO drain continues to produce about 700 mL/day.  -CT chest abdomen pelvis revealed no mets to the chest.  Redemonstrated gastric mass.  And no other masses noted.  - Palliative care on board for advanced goals of care planning.    -Tolerating tube feeds.  First full nocturnal feed overnight.  - Continue:   Oxy 20 mg PO BiD  Jenn 10-15 mg PO PRN  Olanzapine 2.5 mg PO BiD for appetite stimulation and nausea suppression        #Constipation secondary to chronic opioid use complicated by carcinomatous and J-tube placement.  - Patient had bowel movement overnight.  Reports improvement in symptoms.  Ready for discharge.  - On discharge continue:  Metoclopramide 10 mg tabs p.o. 3 times daily  Lactulose 10 mg p.o. 3 times daily PRN constipation  MiraLAX 1 pack p.o. daily as needed constipation   Senna-docusate 8.6-50 mg tabs  2 tabs p.o. twice daily as needed constipation  Simethicone 80 mg chewable p.o. 3 times daily gas  Milk of mag 400 mg and 5 mL p.o. 1 time daily as needed constipation if polyethylene glycol ineffective after 24 hours     #BPH  - Continue:  Flomax 0.4 mg PO daily     #Insomnia   -Zolpedem 5-10mg QHS (hold for sedation)     # Iron deficiency anemia  -Follow-up outpatient.     Dispo:   Home with tube feeding supplies bedside, and prescription sent to pharmacy.  Case management Karmen Grya standing by if patient has difficulty filling prescriptions at [facility] pharmacy.  Karmen spoke with Anuradha Wynne at [facility] pharmacy who will contact Karmen immediately with any medications that are not covered by insurance, and Saba WILSON will fax approved services form.  It was stressed that the patient's Oxycodone 20 mg ER tabs needed prior authorization. Karmen assured these could be covered. Patient instructed to call [facility] clinic with any problems or present to [facility] for assistance.  Discussed danger of narcotic overdose, specifically in combination with Ativan.  Patient reports he infrequently uses Ativan and does not need prescription.  Patient prescribed Narcan intranasal and discuss signs symptoms of opioid overdose.  Patient reports he already has 2 has 2 prescriptions for this at home, understands how to use it, and in mother's bedside understands the signs and symptoms of necrotic overdose and uses as well.        Niko Whittington M.D.   PGY-1  [facility]   [phone]

## 2019-11-10 ENCOUNTER — APPOINTMENT (OUTPATIENT)
Dept: ONCOLOGY | Facility: MEDICAL CENTER | Age: 49
End: 2019-11-10
Attending: INTERNAL MEDICINE
Payer: MEDICARE

## 2019-11-11 ENCOUNTER — APPOINTMENT (OUTPATIENT)
Dept: ONCOLOGY | Facility: MEDICAL CENTER | Age: 49
End: 2019-11-11
Attending: INTERNAL MEDICINE
Payer: MEDICARE

## 2019-11-16 NOTE — DISCHARGE PLANNING
Monica here to  tube feed.  Advised her to discuss with [facility] other alternatives to use for feeding she can purchase should [facility] not be able to deliver in a timely fashion.  Explained that this was a one time courtesy we would provide but that she would need to devise an alternative plan for the future with [facility].

## 2019-11-16 NOTE — DISCHARGE PLANNING
[facility] could not deliver tube feeding for patient until Monday and patient is out of tube feed. Laurel advised patient to call hospital and they would provide enough tube feed to get them through the weekend.  Called dietary for assistance and will provide three bags of Impact tube feed for them.  Mom Monica will .

## 2019-11-23 ENCOUNTER — APPOINTMENT (OUTPATIENT)
Dept: ONCOLOGY | Facility: MEDICAL CENTER | Age: 49
End: 2019-11-23
Attending: INTERNAL MEDICINE
Payer: MEDICARE

## 2019-12-10 ENCOUNTER — APPOINTMENT (OUTPATIENT)
Dept: RADIOLOGY | Facility: MEDICAL CENTER | Age: 49
DRG: 374 | End: 2019-12-10
Attending: EMERGENCY MEDICINE
Payer: MEDICARE

## 2019-12-10 ENCOUNTER — HOSPITAL ENCOUNTER (INPATIENT)
Facility: MEDICAL CENTER | Age: 49
LOS: 2 days | DRG: 374 | End: 2019-12-12
Attending: EMERGENCY MEDICINE | Admitting: FAMILY MEDICINE
Payer: MEDICARE

## 2019-12-10 DIAGNOSIS — G89.3 CANCER ASSOCIATED PAIN: ICD-10-CM

## 2019-12-10 DIAGNOSIS — E87.1 HYPONATREMIA: ICD-10-CM

## 2019-12-10 DIAGNOSIS — C16.8 MALIGNANT NEOPLASM OF OVERLAPPING SITES OF STOMACH (HCC): ICD-10-CM

## 2019-12-10 DIAGNOSIS — R10.13 ABDOMINAL PAIN, EPIGASTRIC: ICD-10-CM

## 2019-12-10 DIAGNOSIS — R10.9 ABDOMINAL PAIN, UNSPECIFIED ABDOMINAL LOCATION: ICD-10-CM

## 2019-12-10 DIAGNOSIS — G89.3 CANCER RELATED PAIN: ICD-10-CM

## 2019-12-10 LAB
ALBUMIN SERPL BCP-MCNC: 3.3 G/DL (ref 3.2–4.9)
ALBUMIN/GLOB SERPL: 1.4 G/DL
ALP SERPL-CCNC: 50 U/L (ref 30–99)
ALT SERPL-CCNC: 27 U/L (ref 2–50)
ANION GAP SERPL CALC-SCNC: 7 MMOL/L (ref 0–11.9)
AST SERPL-CCNC: 19 U/L (ref 12–45)
BASOPHILS # BLD AUTO: 0.2 % (ref 0–1.8)
BASOPHILS # BLD: 0.02 K/UL (ref 0–0.12)
BILIRUB SERPL-MCNC: 0.3 MG/DL (ref 0.1–1.5)
BUN SERPL-MCNC: 51 MG/DL (ref 8–22)
CALCIUM SERPL-MCNC: 8.8 MG/DL (ref 8.5–10.5)
CHLORIDE SERPL-SCNC: 83 MMOL/L (ref 96–112)
CO2 SERPL-SCNC: 24 MMOL/L (ref 20–33)
CREAT SERPL-MCNC: 1.07 MG/DL (ref 0.5–1.4)
EOSINOPHIL # BLD AUTO: 0.01 K/UL (ref 0–0.51)
EOSINOPHIL NFR BLD: 0.1 % (ref 0–6.9)
ERYTHROCYTE [DISTWIDTH] IN BLOOD BY AUTOMATED COUNT: 48.3 FL (ref 35.9–50)
GLOBULIN SER CALC-MCNC: 2.3 G/DL (ref 1.9–3.5)
GLUCOSE SERPL-MCNC: 104 MG/DL (ref 65–99)
HCT VFR BLD AUTO: 36.9 % (ref 42–52)
HGB BLD-MCNC: 13 G/DL (ref 14–18)
IMM GRANULOCYTES # BLD AUTO: 0.04 K/UL (ref 0–0.11)
IMM GRANULOCYTES NFR BLD AUTO: 0.4 % (ref 0–0.9)
LIPASE SERPL-CCNC: 28 U/L (ref 11–82)
LYMPHOCYTES # BLD AUTO: 1.47 K/UL (ref 1–4.8)
LYMPHOCYTES NFR BLD: 15.4 % (ref 22–41)
MCH RBC QN AUTO: 29.9 PG (ref 27–33)
MCHC RBC AUTO-ENTMCNC: 35.2 G/DL (ref 33.7–35.3)
MCV RBC AUTO: 84.8 FL (ref 81.4–97.8)
MONOCYTES # BLD AUTO: 0.61 K/UL (ref 0–0.85)
MONOCYTES NFR BLD AUTO: 6.4 % (ref 0–13.4)
NEUTROPHILS # BLD AUTO: 7.37 K/UL (ref 1.82–7.42)
NEUTROPHILS NFR BLD: 77.5 % (ref 44–72)
NRBC # BLD AUTO: 0 K/UL
NRBC BLD-RTO: 0 /100 WBC
PLATELET # BLD AUTO: 308 K/UL (ref 164–446)
PMV BLD AUTO: 9.1 FL (ref 9–12.9)
POTASSIUM SERPL-SCNC: 5.2 MMOL/L (ref 3.6–5.5)
PROT SERPL-MCNC: 5.6 G/DL (ref 6–8.2)
RBC # BLD AUTO: 4.35 M/UL (ref 4.7–6.1)
SODIUM SERPL-SCNC: 114 MMOL/L (ref 135–145)
WBC # BLD AUTO: 9.5 K/UL (ref 4.8–10.8)

## 2019-12-10 PROCEDURE — 96374 THER/PROPH/DIAG INJ IV PUSH: CPT

## 2019-12-10 PROCEDURE — 99285 EMERGENCY DEPT VISIT HI MDM: CPT

## 2019-12-10 PROCEDURE — 96376 TX/PRO/DX INJ SAME DRUG ADON: CPT

## 2019-12-10 PROCEDURE — 700105 HCHG RX REV CODE 258: Performed by: EMERGENCY MEDICINE

## 2019-12-10 PROCEDURE — 700105 HCHG RX REV CODE 258: Performed by: FAMILY MEDICINE

## 2019-12-10 PROCEDURE — 700117 HCHG RX CONTRAST REV CODE 255: Performed by: EMERGENCY MEDICINE

## 2019-12-10 PROCEDURE — 96375 TX/PRO/DX INJ NEW DRUG ADDON: CPT

## 2019-12-10 PROCEDURE — 700111 HCHG RX REV CODE 636 W/ 250 OVERRIDE (IP): Performed by: STUDENT IN AN ORGANIZED HEALTH CARE EDUCATION/TRAINING PROGRAM

## 2019-12-10 PROCEDURE — 700101 HCHG RX REV CODE 250: Performed by: FAMILY MEDICINE

## 2019-12-10 PROCEDURE — 85025 COMPLETE CBC W/AUTO DIFF WBC: CPT

## 2019-12-10 PROCEDURE — 74177 CT ABD & PELVIS W/CONTRAST: CPT

## 2019-12-10 PROCEDURE — 700111 HCHG RX REV CODE 636 W/ 250 OVERRIDE (IP): Performed by: FAMILY MEDICINE

## 2019-12-10 PROCEDURE — 80053 COMPREHEN METABOLIC PANEL: CPT

## 2019-12-10 PROCEDURE — 83690 ASSAY OF LIPASE: CPT

## 2019-12-10 PROCEDURE — 770001 HCHG ROOM/CARE - MED/SURG/GYN PRIV*

## 2019-12-10 PROCEDURE — 51798 US URINE CAPACITY MEASURE: CPT

## 2019-12-10 PROCEDURE — 700111 HCHG RX REV CODE 636 W/ 250 OVERRIDE (IP): Performed by: EMERGENCY MEDICINE

## 2019-12-10 RX ORDER — PANTOPRAZOLE SODIUM 40 MG/10ML
40 INJECTION, POWDER, LYOPHILIZED, FOR SOLUTION INTRAVENOUS DAILY
Status: DISCONTINUED | OUTPATIENT
Start: 2019-12-10 | End: 2019-12-12 | Stop reason: HOSPADM

## 2019-12-10 RX ORDER — HYDROMORPHONE HYDROCHLORIDE 2 MG/ML
2-5 INJECTION, SOLUTION INTRAMUSCULAR; INTRAVENOUS; SUBCUTANEOUS
Status: DISCONTINUED | OUTPATIENT
Start: 2019-12-10 | End: 2019-12-11

## 2019-12-10 RX ORDER — ONDANSETRON 2 MG/ML
4 INJECTION INTRAMUSCULAR; INTRAVENOUS ONCE
Status: COMPLETED | OUTPATIENT
Start: 2019-12-10 | End: 2019-12-10

## 2019-12-10 RX ORDER — HYDROMORPHONE HYDROCHLORIDE 1 MG/ML
1 INJECTION, SOLUTION INTRAMUSCULAR; INTRAVENOUS; SUBCUTANEOUS ONCE
Status: COMPLETED | OUTPATIENT
Start: 2019-12-10 | End: 2019-12-10

## 2019-12-10 RX ORDER — SODIUM CHLORIDE 9 MG/ML
1000 INJECTION, SOLUTION INTRAVENOUS ONCE
Status: COMPLETED | OUTPATIENT
Start: 2019-12-10 | End: 2019-12-11

## 2019-12-10 RX ORDER — SODIUM CHLORIDE 9 MG/ML
INJECTION, SOLUTION INTRAVENOUS CONTINUOUS
Status: DISCONTINUED | OUTPATIENT
Start: 2019-12-11 | End: 2019-12-10

## 2019-12-10 RX ORDER — AMOXICILLIN 250 MG
2 CAPSULE ORAL 2 TIMES DAILY
Status: DISCONTINUED | OUTPATIENT
Start: 2019-12-10 | End: 2019-12-11

## 2019-12-10 RX ORDER — PROCHLORPERAZINE EDISYLATE 5 MG/ML
10 INJECTION INTRAMUSCULAR; INTRAVENOUS EVERY 6 HOURS PRN
Status: DISCONTINUED | OUTPATIENT
Start: 2019-12-10 | End: 2019-12-12 | Stop reason: HOSPADM

## 2019-12-10 RX ORDER — ONDANSETRON 2 MG/ML
4 INJECTION INTRAMUSCULAR; INTRAVENOUS EVERY 4 HOURS PRN
Status: DISCONTINUED | OUTPATIENT
Start: 2019-12-10 | End: 2019-12-12 | Stop reason: HOSPADM

## 2019-12-10 RX ORDER — LACTULOSE 20 G/30ML
15 SOLUTION ORAL 2 TIMES DAILY
COMMUNITY

## 2019-12-10 RX ORDER — LACTULOSE 20 G/30ML
15 SOLUTION ORAL 2 TIMES DAILY
Status: DISCONTINUED | OUTPATIENT
Start: 2019-12-10 | End: 2019-12-11

## 2019-12-10 RX ORDER — POLYETHYLENE GLYCOL 3350 17 G/17G
1 POWDER, FOR SOLUTION ORAL
Status: DISCONTINUED | OUTPATIENT
Start: 2019-12-10 | End: 2019-12-11

## 2019-12-10 RX ORDER — OXYCODONE HYDROCHLORIDE 15 MG/1
15 TABLET ORAL
COMMUNITY

## 2019-12-10 RX ORDER — HYDROMORPHONE HYDROCHLORIDE 1 MG/ML
1-2 INJECTION, SOLUTION INTRAMUSCULAR; INTRAVENOUS; SUBCUTANEOUS
Status: DISCONTINUED | OUTPATIENT
Start: 2019-12-10 | End: 2019-12-10

## 2019-12-10 RX ORDER — BISACODYL 10 MG
10 SUPPOSITORY, RECTAL RECTAL
Status: DISCONTINUED | OUTPATIENT
Start: 2019-12-10 | End: 2019-12-11

## 2019-12-10 RX ADMIN — IOHEXOL 100 ML: 350 INJECTION, SOLUTION INTRAVENOUS at 14:01

## 2019-12-10 RX ADMIN — SODIUM CHLORIDE: 9 INJECTION, SOLUTION INTRAVENOUS at 15:56

## 2019-12-10 RX ADMIN — SODIUM CHLORIDE 1000 ML: 9 INJECTION, SOLUTION INTRAVENOUS at 13:27

## 2019-12-10 RX ADMIN — HYDROMORPHONE HYDROCHLORIDE 3 MG: 2 INJECTION, SOLUTION INTRAMUSCULAR; INTRAVENOUS; SUBCUTANEOUS at 23:34

## 2019-12-10 RX ADMIN — HYDROMORPHONE HYDROCHLORIDE 2 MG: 2 INJECTION, SOLUTION INTRAMUSCULAR; INTRAVENOUS; SUBCUTANEOUS at 21:56

## 2019-12-10 RX ADMIN — HYDROMORPHONE HYDROCHLORIDE 4 MG: 2 INJECTION, SOLUTION INTRAMUSCULAR; INTRAVENOUS; SUBCUTANEOUS at 20:42

## 2019-12-10 RX ADMIN — ONDANSETRON 4 MG: 2 INJECTION INTRAMUSCULAR; INTRAVENOUS at 13:27

## 2019-12-10 RX ADMIN — HYDROMORPHONE HYDROCHLORIDE 1 MG: 1 INJECTION, SOLUTION INTRAMUSCULAR; INTRAVENOUS; SUBCUTANEOUS at 13:29

## 2019-12-10 RX ADMIN — HYDROMORPHONE HYDROCHLORIDE 1 MG: 1 INJECTION, SOLUTION INTRAMUSCULAR; INTRAVENOUS; SUBCUTANEOUS at 15:56

## 2019-12-10 RX ADMIN — THIAMINE HYDROCHLORIDE: 100 INJECTION, SOLUTION INTRAMUSCULAR; INTRAVENOUS at 16:00

## 2019-12-10 NOTE — ED TRIAGE NOTES
Chief Complaint   Patient presents with   • Body Aches     stage IV stomach cancer   pt states increased body aches and abdominal pain +nausea pt was referred to ED from Dr. Keyes.

## 2019-12-10 NOTE — ED NOTES
Summer from Lab called with critical result of Na 114 at 1435. Critical lab result read back to Summer.   Dr. Fox notified of critical lab result at 1436.  Critical lab result read back by Dr. Fox.

## 2019-12-10 NOTE — ED PROVIDER NOTES
"ED Provider Note    CHIEF COMPLAINT  Chief Complaint   Patient presents with   • Body Aches     stage IV stomach cancer       HPI  Tima Rizzo is a 49 y.o. male with stage IV gastric adenocarcinoma who presents complaining of intractable abdominal pain and nausea.    Patient states he takes oxycodone 60 mg for breakthrough pain.  His last dose was 2 hours ago and had no effect on the patient's pain which is located in the central abdomen.  It is nonradiating, intermittent, waxing and waning, moderate to severe.  Patient has been \"dry heaving.\"  Patient takes meds by mouth but food by his G-tube.  He reports a small amount of drainage from his PINO drain.    Patient denies fever, melena, hematochezia, hematemesis    Referred by Dr. Keyes from oncology       ALLERGIES  No Known Allergies    CURRENT MEDICATIONS  Home Medications     Reviewed by Natalia Rizo R.N. (Registered Nurse) on 12/10/19 at 1138  Med List Status: Partial   Medication Last Dose Status   amLODIPine (NORVASC) 10 MG Tab  Active   bisacodyl (DULCOLAX) 10 MG Suppos  Active   DULoxetine (CYMBALTA) 20 MG Cap DR Particles  Active   lactulose 20 GM/30ML Solution  Active   lisinopril (PRINIVIL) 20 MG Tab  Active   LORazepam (ATIVAN) 0.5 MG Tab  Active   magnesium hydroxide (MILK OF MAGNESIA) 400 MG/5ML Suspension  Active   metoclopramide (REGLAN) 10 MG Tab  Active   Naloxone (NARCAN) 4 MG/0.1ML Liquid  Active   OLANZapine (ZYPREXA) 2.5 MG Tab  Active   omeprazole (PRILOSEC) 40 MG delayed-release capsule  Active   ondansetron (ZOFRAN ODT) 8 MG TABLET DISPERSIBLE  Active   prochlorperazine (COMPAZINE) 10 MG Tab  Active   senna-docusate (PERICOLACE OR SENOKOT S) 8.6-50 MG Tab  Active   simethicone (MYLICON) 80 MG Chew Tab  Active   tamsulosin (FLOMAX) 0.4 MG capsule  Active                PAST MEDICAL HISTORY   has a past medical history of Anesthesia, Anxiety, Bell palsy (2000; 2010), Cancer (HCC) (2019), Enlarged prostate, History of positive PPD, " "untreated, Hypertension, Numbness of arm, Pain, Prediabetes, Psychiatric problem, Snoring, and Urinary bladder disorder.    SURGICAL HISTORY   has a past surgical history that includes other surgical procedure (2014); upper gi endoscopy,biopsy (6/12/2019); upper gi endoscopy,w/dilat,gastric out (6/12/2019); gastroscopy (6/12/2019); egd w/endoscopic ultrasound (6/12/2019); egd with asp/bx (6/12/2019); cath placement (Right, 6/13/2019); lap,diagnostic abdomen (10/31/2019); and placement enterostomy/cecostomy tube open (10/31/2019).    SOCIAL HISTORY  Social History     Tobacco Use   • Smoking status: Current Every Day Smoker     Packs/day: 0.50     Years: 30.00     Pack years: 15.00     Types: Cigarettes   • Smokeless tobacco: Never Used   Substance and Sexual Activity   • Alcohol use: Yes     Comment: 2 per year   • Drug use: Yes     Types: Marijuana     Comment: Cannabis, daily   • Sexual activity: Not on file     Here with his mom, son, and his son's fiancé    REVIEW OF SYSTEMS  See HPI for further details.  All other systems are negative except as above in HPI.      PHYSICAL EXAM  VITAL SIGNS: /86   Pulse 88   Temp 36.8 °C (98.2 °F) (Temporal)   Resp 16   Ht 1.702 m (5' 7\")   Wt 46.3 kg (102 lb 1.2 oz)   SpO2 91%   BMI 15.99 kg/m²     General:  WDthin, chronically ill-appearing; uncomfortable appearing; alert, answers questions sporadically; V/S as above' afebrile  Skin: warm and dry; good color; no rash  HEENT: NCAT; EOMs intact; PERRL; no scleral icterus   Neck: FROM; soft  Cardiovascular: Regular heart rate and rhythm.  No murmurs, rubs, or gallops; pulses 2+ bilaterally radially  Lungs: Clear to auscultation with good air movement bilaterally.  No wheezes, rhonchi, or rales.   Abdomen: BS present; G-tube and PINO drain noted; soft; NTND; no rebound, guarding, or rigidity.  No organomegaly or pulsatile mass  Extremities: YUN x 4; no e/o trauma; no pedal edema  Neurologic: CNs III-XII grossly intact; " speech clear; distal sensation intact; strength 5/5 UE/LEs  Psychiatric: Appropriate affect, anxious mood    LABS  Labs Reviewed   CBC WITH DIFFERENTIAL - Abnormal; Notable for the following components:       Result Value    RBC 4.35 (*)     Hemoglobin 13.0 (*)     Hematocrit 36.9 (*)     Neutrophils-Polys 77.50 (*)     Lymphocytes 15.40 (*)     All other components within normal limits   COMP METABOLIC PANEL - Abnormal; Notable for the following components:    Sodium 114 (*)     Chloride 83 (*)     Glucose 104 (*)     Bun 51 (*)     Total Protein 5.6 (*)     All other components within normal limits   BASIC METABOLIC PANEL - Abnormal; Notable for the following components:    Sodium 118 (*)     Chloride 90 (*)     Bun 36 (*)     Calcium 8.1 (*)     All other components within normal limits   LIPASE   ESTIMATED GFR   ESTIMATED GFR         IMAGING  CT-ABDOMEN-PELVIS WITH   Final Result      1.  Moderate left hydronephrosis with delayed left nephrogram. No right hydronephrosis.   2.  Known gastric mass with associated fat stranding around the stomach. No abdominopelvic metastatic disease is detected.   3.  Jejunostomy and percutaneous drain.          MEDICAL RECORD  I have reviewed patient's medical record and pertinent results are listed below.      COURSE & MEDICAL DECISION MAKING  I have reviewed any medical record information, laboratory studies and radiographic results as noted.    Tima Rizzo is a 49 y.o. male with stage IV gastric adenocarcinoma who presents complaining of abdominal pain and vomiting.  Patient is cachectic, dehydrated and chronically ill-appearing.  Patient was referred by oncology over the phone.    NS bolus was ordered for possible dehydration related to patient's sxs of nausea, decreased p.o. intake.    Dilaudid and Zofran were ordered.    Labs demonstrate hyponatremia and anemia.    CT demonstrates left hydronephrosis.  No apparent compression of the ureter by the cancer or  metastasis.  I contacted the radiologist who looked at the prior CT scan for the patient and noted there was, at that time, bilateral hydronephrosis.  I have contacted urology.  Dr. Denney will consult.    3:10 PM  [facility] family medicine was contacted and agrees to hospitalize the patient.  Discussed with Dr. Hendrix/oncology who is aware pt will be hospitalized      FINAL IMPRESSION  1. Abdominal pain, unspecified abdominal location     2. Malignant neoplasm of overlapping sites of stomach (HCC)  REFERRAL TO HOSPICE   3. Hyponatremia              Electronically signed by: Jocelyne Fox, 12/10/2019 12:31 PM

## 2019-12-10 NOTE — H&P
[facility] MEDICINE HISTORY AND PHYSICAL     PATIENT ID:  NAME:  Tima Rizzo  MRN:               [id number]  YOB: 1970    Date of Admission: 12/10/2019     Attending: Dr. Andry Childers    Resident: DO Pavan Fitzgerald MD    Primary Care Physician:  Jenae Steward MD    CC:  Abdominal pain and nausea    HPI: Tima Rizzo is a 49 y.o. male who presented with abdominal pain, increased nausea and vomiting and PO intolerance in the setting of stage 4 gastric CA w/ peritoneal carcinomatosis. Patient w/ ongoing severe pain that is refractory to outpt tx and also w/ continuous retching at home. Can tolerate only minimal feeds through j-tube and cannot take most of his meds through the j-tube. He has been taking high doses of his oxycodone at home without relief. He reports that his last bowel movement was yesterday. He has been unable to tolerate PO medications.     Dr. Keyes (oncology) just spoke w/ family and recommended hospice w/ end-of-life care for a dismal prognosis of only a few weeks.      REVIEW OF SYSTEMS:   Patient reports nausea and vomiting with diffuse abdominal pain. Reports normal bowel movements. Otherwise, complete 10 system ROS was negative.             PAST MEDICAL HISTORY:  Past Medical History:   Diagnosis Date   • Anesthesia     slow to wake up after EGD   • Anxiety    • Bell palsy 2000; 2010    x2; mild left facial droop   • Cancer (HCC) 2019    Gastric   • Enlarged prostate    • History of positive PPD, untreated    • Hypertension    • Numbness of arm     and hands, bilateral   • Pain     neck and arms   • Prediabetes    • Psychiatric problem     depression    • Snoring    • Urinary bladder disorder     frequent urination (enlarged prostate)       PAST SURGICAL HISTORY:  Past Surgical History:   Procedure Laterality Date   • PB LAP,DIAGNOSTIC ABDOMEN  10/31/2019    Procedure: LAPAROSCOPY, DIAGNOSTIC, ROBOT-ASSISTED, USING DA KRISTINE XI;  Surgeon: Dyllan TAPIA  LILA Cisneros;  Location: [facility];  Service: General   • PB PLACEMENT ENTEROSTOMY/CECOSTOMY TUBE OPEN  10/31/2019    Procedure: CREATION, JEJUNOSTOMY;  Surgeon: Dyllan Cisneros M.D.;  Location: [facility];  Service: General   • CATH PLACEMENT Right 6/13/2019    Procedure: INSERTION, CATHETER- CEPHALIC POWER PORT  ;  Surgeon: Dickson Simpson M.D.;  Location: [facility];  Service: General   • PB UPPER GI ENDOSCOPY,BIOPSY  6/12/2019    Procedure: GASTROSCOPY, WITH BIOPSY - POSSIBLE;  Surgeon: Bjorn Claudio M.D.;  Location: [facility];  Service: Gastroenterology   • PB UPPER GI ENDOSCOPY,W/DILAT,GASTRIC OUT  6/12/2019    Procedure: GASTROSCOPY, WITH BALLOON DILATION;  Surgeon: Bjorn Claudio M.D.;  Location: [facility];  Service: Gastroenterology   • GASTROSCOPY  6/12/2019    Procedure: GASTROSCOPY;  Surgeon: Bjorn Claudio M.D.;  Location: [facility];  Service: Gastroenterology   • EGD W/ENDOSCOPIC ULTRASOUND  6/12/2019    Procedure: EGD, WITH ENDOSCOPIC US - UPPER, RADIAL;  Surgeon: Bjorn Claudio M.D.;  Location: [facility];  Service: Gastroenterology   • EGD WITH ASP/BX  6/12/2019    Procedure: EGD, WITH ASPIRATION BIOPSY - POSSIBLE;  Surgeon: Bjorn Claudio M.D.;  Location: [facility];  Service: Gastroenterology   • OTHER SURGICAL PROCEDURE  2014    cervical discectomy with plates       FAMILY HISTORY:  No family history on file.    SOCIAL HISTORY:   Smoking 1 cigarette daily, some vaping marijuana and also some nicotine vapes  Etoh use none  Drug use as above    DIET:   Normally j-tube enteral nutrition    ALLERGIES:  No Known Allergies    OUTPATIENT MEDICATIONS:  No current facility-administered medications for this encounter.     Current Outpatient Medications:   •  lactulose 20 GM/30ML Solution, Take 15 mL by mouth 2 Times a Day., Disp: , Rfl:   •  oxycodone (OXY-IR) 15 MG immediate  "release tablet, Take 15 mg by mouth every 3 days as needed for Severe Pain., Disp: , Rfl:   •  omeprazole (PRILOSEC) 40 MG delayed-release capsule, Take 1 Cap by mouth every day., Disp: 30 Cap, Rfl: 1  •  prochlorperazine (COMPAZINE) 10 MG Tab, Take 1 Tab by mouth every 6 hours as needed for Nausea/Vomiting., Disp: 30 Tab, Rfl: 3  •  Naloxone (NARCAN) 4 MG/0.1ML Liquid, Spray 4 mg in nose Once PRN (For opiod overdose symptoms.) for up to 1 dose., Disp: 1 Package, Rfl: 0    PHYSICAL EXAM:  Vitals:    12/10/19 1108 12/10/19 1133 12/10/19 1330   BP: 114/86  124/93   Pulse: 88  82   Resp: 16  15   Temp: 36.8 °C (98.2 °F)     TempSrc: Temporal     SpO2: 91%  99%   Weight:  46.3 kg (102 lb 1.2 oz)    Height: 1.702 m (5' 7\")     , Temp (24hrs), Av.8 °C (98.2 °F), Min:36.8 °C (98.2 °F), Max:36.8 °C (98.2 °F)  , Pulse Oximetry: 99 %, O2 Delivery: None (Room Air)    General: Pt resting in NAD, cooperative, Cachetic, appears older than stated age.  Skin:  Pink, warm and dry.  No rashes  HEENT: NC/AT. PERRL. EOMI. MMM. No nasal discharge. Oropharynx nonerythematous without exudate/plaques  Neck:  Supple without lymphadenopathy or rigidity. No JVD   Lungs:  Symmetrical.  CTAB with no W/R/R.  Good air movement   Cardiovascular:  S1/S2 RRR without M/R/G.  Abdomen:  Abdomen is very thin, soft, and ND. +BS. No masses noted. Diffusely tender. G-tube in place. Drain in place draining serous fluid.  Genitourinary:  Exam deferred  Extremities:  Full range of motion. No gross deformities noted. 2+ pulses in all extremities. No C/C/E   Spine:  Straight without vertebral anomalies.  CNS:  Muscle tone is normal. Cranial nerves II-XII grossly intact. 2+ DTRs.       ERCourse:  Patient was treated in the ER with dilaudid, NS, and zofran. Patient was intractable pain and [facility] was contacted to admit the patient. CT showed bilateral hydronephrosis and Urology was consulted from the ED.    LAB TESTS:   Recent Labs     12/10/19  1320   WBC 9.5 "   RBC 4.35*   HEMOGLOBIN 13.0*   HEMATOCRIT 36.9*   MCV 84.8   MCH 29.9   RDW 48.3   PLATELETCT 308   MPV 9.1   NEUTSPOLYS 77.50*   LYMPHOCYTES 15.40*   MONOCYTES 6.40   EOSINOPHILS 0.10   BASOPHILS 0.20         Recent Labs     12/10/19  1320   SODIUM 114*   POTASSIUM 5.2   CHLORIDE 83*   CO2 24   BUN 51*   CREATININE 1.07   CALCIUM 8.8   ALBUMIN 3.3       CULTURES:   Results     Procedure Component Value Units Date/Time    URINALYSIS [[id number]]     Order Status:  Sent Specimen:  Urine           IMAGES:  CT-ABDOMEN-PELVIS WITH   Final Result      1.  Moderate left hydronephrosis with delayed left nephrogram. No right hydronephrosis.   2.  Known gastric mass with associated fat stranding around the stomach. No abdominopelvic metastatic disease is detected.   3.  Jejunostomy and percutaneous drain.          CONSULTS:   Urology  Oncology    ASSESSMENT/PLAN: 49 y.o. male admitted for intractable pain with nausea and vomiting. Patient has stage 4 gastric CA w/ peritoneal carcinomatosis.    # Stage 4 Gastric cancer with peritoneal carcinomatosis:  # Abdominal pain:  Patient has a history of gastric cancer for which he sees Dr. Keyes.  Patient takes oxycodone at home, and this has not been controlling his pain.  He has not been able to take PO medications.   Dr. Keyes was contacted who recommended referral to palliative and hospice.  Plan:  - admit to oncology  - dilaudid 1-2mg Q2 hours PRN for pain  - consider dilaudid pump if no control with PRN dilaudid  - consult to hospice/palliative    # Nausea:  # Vomiting:  Patient has been nauseous and has been vomiting.  He takes compazine at home.  - zofran q4 hours PRN  - Compazine q6 hours PRN  - Pantoprazole daily    # Left Hydronephrosis:  Patient had CT abdomen/pelvis in the ED that showed moderate left hydronephrosis with delayed left nephrogram. No right hydronephrosis.  Patient is not reporting problems with urination. Patient has been having normal bowel movements. Has  a history of BPH.  Plan:  - Urology was consulted in the ED  - Bladder scan and if post-void residual elevated will place wang  - patient likely DNR/DNI and establishing w/ hospice - hope that we can find minimally invasive way of managing urologic condition    # Hyponatremia:  Patient has a sodium level of 114  Suspect chronic in nature - malnutrition and also hypovolemic hyponatremia  Patient has had decreasing amount of tube feeds.   Cachetic and dehydrated on Exam  Hypovolemic hyponatremia.     Extensive counseling - if healthy then he would merit close moniitoring, frequent blood draws and calculated management - this is in opposition to his current goals of care - will defer aggressive management   Plan:  - Rally bag at 50/hour  - no sodium level checks    # Malnutrition:  # Dehydration:  Patient is cachetic on exam.   Albumin normal at 3.3  His mother states he received his feeds for the past 2 days.  He appears very dehydrated with sodium of 114.  - fluid resuscitation with rally bag gently and slowly  - nutrition consult for tube feeds    # End of Life Care:  Patient has a terminal diagnosis.  Counseling w/ patient - initially desiring to be full code but after explanation he would like time to think about it  Plan  - Hospice referral  - Palliative care referral    ABX: none  DVT ppx - none - currently at baseline activity/risk  GI PPX: pantoprazole  Lines: G-Tube left abdomen, Port right chest, Drain left abdomen.  Diet: tube feeds per nutrition  Code Status: Full code    Disposition: Admit to Oncology inpatient. Patient to be seen by urology. Referrals to hospice and palliative.    This note was co-written by residents caring for this patient  Pavan Austin MD, PGY-1  Genaro Moreira DO PGY3  [facility]

## 2019-12-11 LAB
ANION GAP SERPL CALC-SCNC: 5 MMOL/L (ref 0–11.9)
BUN SERPL-MCNC: 36 MG/DL (ref 8–22)
CALCIUM SERPL-MCNC: 8.1 MG/DL (ref 8.5–10.5)
CHLORIDE SERPL-SCNC: 90 MMOL/L (ref 96–112)
CO2 SERPL-SCNC: 23 MMOL/L (ref 20–33)
CREAT SERPL-MCNC: 0.98 MG/DL (ref 0.5–1.4)
GLUCOSE SERPL-MCNC: 95 MG/DL (ref 65–99)
POTASSIUM SERPL-SCNC: 4.8 MMOL/L (ref 3.6–5.5)
SODIUM SERPL-SCNC: 118 MMOL/L (ref 135–145)

## 2019-12-11 PROCEDURE — 80048 BASIC METABOLIC PNL TOTAL CA: CPT

## 2019-12-11 PROCEDURE — 302128 INFUSION PUMP: Performed by: FAMILY MEDICINE

## 2019-12-11 PROCEDURE — 700101 HCHG RX REV CODE 250: Performed by: STUDENT IN AN ORGANIZED HEALTH CARE EDUCATION/TRAINING PROGRAM

## 2019-12-11 PROCEDURE — 700102 HCHG RX REV CODE 250 W/ 637 OVERRIDE(OP): Performed by: FAMILY MEDICINE

## 2019-12-11 PROCEDURE — 770001 HCHG ROOM/CARE - MED/SURG/GYN PRIV*

## 2019-12-11 PROCEDURE — C9113 INJ PANTOPRAZOLE SODIUM, VIA: HCPCS | Performed by: STUDENT IN AN ORGANIZED HEALTH CARE EDUCATION/TRAINING PROGRAM

## 2019-12-11 PROCEDURE — A9270 NON-COVERED ITEM OR SERVICE: HCPCS | Performed by: FAMILY MEDICINE

## 2019-12-11 PROCEDURE — 302129 PCA PLUS: Performed by: FAMILY MEDICINE

## 2019-12-11 PROCEDURE — 700101 HCHG RX REV CODE 250: Performed by: FAMILY MEDICINE

## 2019-12-11 PROCEDURE — 302136 NUTRITION PUMP: Performed by: FAMILY MEDICINE

## 2019-12-11 PROCEDURE — 700102 HCHG RX REV CODE 250 W/ 637 OVERRIDE(OP): Performed by: STUDENT IN AN ORGANIZED HEALTH CARE EDUCATION/TRAINING PROGRAM

## 2019-12-11 PROCEDURE — A9270 NON-COVERED ITEM OR SERVICE: HCPCS | Performed by: STUDENT IN AN ORGANIZED HEALTH CARE EDUCATION/TRAINING PROGRAM

## 2019-12-11 PROCEDURE — 700111 HCHG RX REV CODE 636 W/ 250 OVERRIDE (IP): Performed by: STUDENT IN AN ORGANIZED HEALTH CARE EDUCATION/TRAINING PROGRAM

## 2019-12-11 RX ORDER — AMOXICILLIN 250 MG
2 CAPSULE ORAL 2 TIMES DAILY
Status: DISCONTINUED | OUTPATIENT
Start: 2019-12-11 | End: 2019-12-12 | Stop reason: HOSPADM

## 2019-12-11 RX ORDER — BISACODYL 10 MG
10 SUPPOSITORY, RECTAL RECTAL
Status: DISCONTINUED | OUTPATIENT
Start: 2019-12-11 | End: 2019-12-12 | Stop reason: HOSPADM

## 2019-12-11 RX ORDER — MORPHINE SULFATE 10 MG/5ML
10 SOLUTION ORAL
Status: DISCONTINUED | OUTPATIENT
Start: 2019-12-11 | End: 2019-12-11

## 2019-12-11 RX ORDER — LORAZEPAM 2 MG/ML
0.5 INJECTION INTRAMUSCULAR EVERY 4 HOURS PRN
Status: DISCONTINUED | OUTPATIENT
Start: 2019-12-11 | End: 2019-12-11

## 2019-12-11 RX ORDER — MORPHINE SULFATE 10 MG/5ML
20 SOLUTION ORAL
Status: DISCONTINUED | OUTPATIENT
Start: 2019-12-11 | End: 2019-12-12

## 2019-12-11 RX ORDER — LORAZEPAM 2 MG/ML
0.5 INJECTION INTRAMUSCULAR EVERY 4 HOURS PRN
Status: DISCONTINUED | OUTPATIENT
Start: 2019-12-11 | End: 2019-12-12 | Stop reason: HOSPADM

## 2019-12-11 RX ORDER — LORAZEPAM 2 MG/ML
0.25 INJECTION INTRAMUSCULAR EVERY 4 HOURS PRN
Status: DISCONTINUED | OUTPATIENT
Start: 2019-12-11 | End: 2019-12-11

## 2019-12-11 RX ORDER — LACTULOSE 20 G/30ML
15 SOLUTION ORAL 2 TIMES DAILY
Status: DISCONTINUED | OUTPATIENT
Start: 2019-12-11 | End: 2019-12-12 | Stop reason: HOSPADM

## 2019-12-11 RX ORDER — POLYETHYLENE GLYCOL 3350 17 G/17G
1 POWDER, FOR SOLUTION ORAL
Status: DISCONTINUED | OUTPATIENT
Start: 2019-12-11 | End: 2019-12-12 | Stop reason: HOSPADM

## 2019-12-11 RX ORDER — FENTANYL 25 UG/1
1 PATCH TRANSDERMAL
Status: DISCONTINUED | OUTPATIENT
Start: 2019-12-11 | End: 2019-12-12 | Stop reason: HOSPADM

## 2019-12-11 RX ADMIN — ONDANSETRON 4 MG: 2 INJECTION INTRAMUSCULAR; INTRAVENOUS at 08:03

## 2019-12-11 RX ADMIN — SENNOSIDES AND DOCUSATE SODIUM 2 TABLET: 8.6; 5 TABLET ORAL at 05:39

## 2019-12-11 RX ADMIN — PROCHLORPERAZINE EDISYLATE 10 MG: 5 INJECTION INTRAMUSCULAR; INTRAVENOUS at 23:29

## 2019-12-11 RX ADMIN — MORPHINE SULFATE 2.5 MG: 10 SOLUTION ORAL at 18:39

## 2019-12-11 RX ADMIN — MORPHINE SULFATE 20 MG: 10 SOLUTION ORAL at 23:21

## 2019-12-11 RX ADMIN — LORAZEPAM 0.5 MG: 2 INJECTION INTRAMUSCULAR at 23:29

## 2019-12-11 RX ADMIN — MORPHINE SULFATE 10 MG: 10 SOLUTION ORAL at 20:08

## 2019-12-11 RX ADMIN — FENTANYL 1 PATCH: 25 PATCH, EXTENDED RELEASE TRANSDERMAL at 11:18

## 2019-12-11 RX ADMIN — PROCHLORPERAZINE EDISYLATE 10 MG: 5 INJECTION INTRAMUSCULAR; INTRAVENOUS at 05:44

## 2019-12-11 RX ADMIN — HYDROMORPHONE HYDROCHLORIDE 4 MG: 2 INJECTION, SOLUTION INTRAMUSCULAR; INTRAVENOUS; SUBCUTANEOUS at 08:03

## 2019-12-11 RX ADMIN — LACTULOSE 15 ML: 20 SOLUTION ORAL at 20:09

## 2019-12-11 RX ADMIN — MORPHINE SULFATE 10 MG: 10 SOLUTION ORAL at 21:12

## 2019-12-11 RX ADMIN — PANTOPRAZOLE SODIUM 40 MG: 40 INJECTION, POWDER, LYOPHILIZED, FOR SOLUTION INTRAVENOUS at 05:38

## 2019-12-11 RX ADMIN — LACTULOSE 15 ML: 20 SOLUTION ORAL at 05:38

## 2019-12-11 RX ADMIN — LORAZEPAM 0.5 MG: 2 INJECTION INTRAMUSCULAR at 10:24

## 2019-12-11 RX ADMIN — THIAMINE HYDROCHLORIDE: 100 INJECTION, SOLUTION INTRAMUSCULAR; INTRAVENOUS at 11:08

## 2019-12-11 RX ADMIN — SENNOSIDES AND DOCUSATE SODIUM 2 TABLET: 8.6; 5 TABLET ORAL at 20:09

## 2019-12-11 RX ADMIN — HYDROMORPHONE HYDROCHLORIDE 3 MG: 2 INJECTION, SOLUTION INTRAMUSCULAR; INTRAVENOUS; SUBCUTANEOUS at 05:39

## 2019-12-11 RX ADMIN — MORPHINE SULFATE 10 MG: 10 SOLUTION ORAL at 22:12

## 2019-12-11 ASSESSMENT — COGNITIVE AND FUNCTIONAL STATUS - GENERAL
SUGGESTED CMS G CODE MODIFIER MOBILITY: CI
HELP NEEDED FOR BATHING: A LITTLE
MOBILITY SCORE: 23
SUGGESTED CMS G CODE MODIFIER DAILY ACTIVITY: CJ
DAILY ACTIVITIY SCORE: 22
CLIMB 3 TO 5 STEPS WITH RAILING: A LITTLE
DRESSING REGULAR UPPER BODY CLOTHING: A LITTLE

## 2019-12-11 ASSESSMENT — LIFESTYLE VARIABLES
EVER HAD A DRINK FIRST THING IN THE MORNING TO STEADY YOUR NERVES TO GET RID OF A HANGOVER: NO
CONSUMPTION TOTAL: NEGATIVE
AVERAGE NUMBER OF DAYS PER WEEK YOU HAVE A DRINK CONTAINING ALCOHOL: 0
TOTAL SCORE: 0
HOW MANY TIMES IN THE PAST YEAR HAVE YOU HAD 5 OR MORE DRINKS IN A DAY: 0
DOES PATIENT WANT TO STOP DRINKING: NO
ON A TYPICAL DAY WHEN YOU DRINK ALCOHOL HOW MANY DRINKS DO YOU HAVE: 0
HAVE YOU EVER FELT YOU SHOULD CUT DOWN ON YOUR DRINKING: NO
TOTAL SCORE: 0
EVER FELT BAD OR GUILTY ABOUT YOUR DRINKING: NO
ALCOHOL_USE: NO
TOTAL SCORE: 0
HAVE PEOPLE ANNOYED YOU BY CRITICIZING YOUR DRINKING: NO

## 2019-12-11 NOTE — PROGRESS NOTES
"Patient asked about advanced directive and living will.  Patient states he has one but isn't notarized.  (Patients mother interjected at this time \"Hospice is going to come notarize it tomorrow\")  Asked patient in the event that he is incapacitated or able to make decisions who would make decisions for him. Patient stated in front of large amounts of family he would like his son to be his decision maker.  Sons name and information taken down:   Son Madison Rizzo- [phone].  "

## 2019-12-11 NOTE — DISCHARGE PLANNING
Met with mom and pt. Pt is currently with [facility] and they chose [facility]. Pt would like a hospital bed and be kept comfortable and pain free through sublingual medication.     Mom said that she is retired and will take care of pt at home. Pt may possibly need [facility] transport to go home since he has one flight of steps to get in the apartment and has been very weak and unable to walk without assistance.    Pt has tubefeeding and supplies are from [facility].   Faxed Hospice choice to [facility].    Care Transition Team Assessment    Information Source  Orientation : Oriented x 4  Information Given By: Parent  Who is responsible for making decisions for patient? : Patient    Readmission Evaluation  Is this a readmission?: No    Elopement Risk  Legal Hold: No    Interdisciplinary Discharge Planning  Does Admitting Nurse Feel This Could be a Complex Discharge?: No  Primary Care Physician: Dr. Eli PCP  Lives with - Patient's Self Care Capacity: Parents  Support Systems: Parent  Housing / Facility: 1 Story Apartment / Condo(1 flight of stairs to get in)  Do You Take your Prescribed Medications Regularly: Yes  Able to Return to Previous ADL's: No  Mobility Issues: Yes  Prior Services: Skilled Home Health Services  Patient Expects to be Discharged to:: Home hospice  Assistance Needed: Yes  Durable Medical Equipment: Walker    Discharge Preparedness  What is your plan after discharge?: Other (comment)(with hospice care)  What are your discharge supports?: Child, Parent  Prior Functional Level: Needs Assist with Activities of Daily Living, Needs Assist with Medication Management, Uses Walker  Difficulity with ADLs: Bathing, Dressing, Eating, Toileting, Walking  Difficulity with IADLs: Cooking, Driving, Laundry, Shopping    Functional Assesment  Prior Functional Level: Needs Assist with Activities of Daily Living, Needs Assist with Medication Management, Uses Walker    Finances  Financial Barriers to Discharge:  No  Prescription Coverage: Yes              Advance Directive  Advance Directive?: None(hospice referral sent)  Advance Directive offered?: AD Booklet given    Domestic Abuse  Have you ever been the victim of abuse or violence?: No         Discharge Risks or Barriers  Discharge risks or barriers?: No    Anticipated Discharge Information  Anticipated discharge disposition: Home, Hospice

## 2019-12-11 NOTE — PROGRESS NOTES
Followed up with patient.  Patient now alert and oriented. Patient was happy with ativan dose and ok with being very sleepy, discussed his family was concerns and dose lowered for that reason.  Will reevaluate new dose next time given to see if effective for patient comfort goals.  Reviewed medications and fluids because patient was sleepy when given.  Patient goals as reached per discussion and patient is ok with current comfort goals.  Patient declined pain medication at this time.  Patient mother body language and behavior does not appear to be in line with patients. I will continue to advocate for patient.  Advance directives and 5 wishes should be discussed for when patient unable to make own decisions, concerns mothers wishes are not aligned with patient.

## 2019-12-11 NOTE — CARE PLAN
Problem: Communication  Goal: The ability to communicate needs accurately and effectively will improve  Outcome: PROGRESSING AS EXPECTED     Problem: Safety  Goal: Will remain free from injury  Outcome: PROGRESSING AS EXPECTED  Goal: Will remain free from falls  Outcome: PROGRESSING AS EXPECTED     Problem: Infection  Goal: Will remain free from infection  Outcome: PROGRESSING AS EXPECTED     Problem: Bowel/Gastric:  Goal: Will not experience complications related to bowel motility  Outcome: PROGRESSING AS EXPECTED     Problem: Knowledge Deficit  Goal: Knowledge of disease process/condition, treatment plan, diagnostic tests, and medications will improve  Outcome: PROGRESSING AS EXPECTED  Goal: Knowledge of the prescribed therapeutic regimen will improve  Outcome: PROGRESSING AS EXPECTED     Problem: Discharge Barriers/Planning  Goal: Patient's continuum of care needs will be met  Outcome: PROGRESSING AS EXPECTED     Problem: Pain Management  Goal: Pain level will decrease to patient's comfort goal  Outcome: PROGRESSING AS EXPECTED     Problem: Mobility  Goal: Risk for activity intolerance will decrease  Outcome: PROGRESSING AS EXPECTED     Problem: Psychosocial Needs:  Goal: Level of anxiety will decrease  Outcome: PROGRESSING AS EXPECTED     Problem: Skin Integrity  Goal: Risk for impaired skin integrity will decrease  Outcome: PROGRESSING AS EXPECTED     Problem: Venous Thromboembolism (VTW)/Deep Vein Thrombosis (DVT) Prevention:  Goal: Patient will participate in Venous Thrombosis (VTE)/Deep Vein Thrombosis (DVT)Prevention Measures  Outcome: PROGRESSING SLOWER THAN EXPECTED     Problem: Bowel/Gastric:  Goal: Normal bowel function is maintained or improved  Outcome: PROGRESSING SLOWER THAN EXPECTED

## 2019-12-11 NOTE — PROGRESS NOTES
indepth conversation with [facility] about issues with patient family medication decrease and comfort status.  MD ok with q shift vitals no pulse ox  With come to talk with patient family and patient when patient more arousable

## 2019-12-11 NOTE — PROGRESS NOTES
Patient not on any fluids also on high interval pain medication.  Given pain meds and needs PCA would be more appropriate for patient will recommend for patient.

## 2019-12-11 NOTE — DISCHARGE PLANNING
Agency/Facility Name: [facility]  Spoke To: Yamileth  Outcome: Patient accepted onto service. DME will be delivered to patients home tomorrow AM.     EMMANUEL yoo.

## 2019-12-11 NOTE — PROGRESS NOTES
"Called to room by family.  They wanted to know why patient wont wake up.  Patient arousable to pain stimuli.  Expressed patient received. 0.5 MG of ativan as requested for anxiety when brother was in room.  Patient was awake at that time.  That I came back and hung fluids patient was drowsy arousable at that time (1108)  I came back a little while later to give fentanyl patch at 1118 and again patient was drowsy arousable.  Patient is arousable to pain stimuli.  Discussed patient sleepy from ativan.  Discussed speaking with patient if he would like less next time around however dosing is fairly small to start with.  Family stated we don't want him in a coma.  I expressed that \"we don't do that here\".  I will update charge about issues with family.    Dariela PEREYRA also discussed with family asking about interventions while they were gone and stating we placed patient in a coma.      Patient is comfort care.  "

## 2019-12-11 NOTE — DISCHARGE PLANNING
Received Choice form at 0800  Agency/Facility Name: [facility]   Referral sent per Choice form @ 0875

## 2019-12-11 NOTE — PROGRESS NOTES
INTERVAL NOTE    Bedside discussion w/ family and patient:    Discussed the difficulty of managing his sodium level and appropriate correction of this and the possible associated complications. I explained how this is, and everything that comes with it (tele, frequent blood draws, close medical management) are contrary to his current goal, which is calm, comfort, peaceful time with his family and the ability to go home with hospice support if he can.   He and his family verbalize that they understand these things are not compatible with one another.   I also discussed the potential complications of poor electrolyte status including being an unstable patient that might code and how the aggressive nature of resuscitation would increase his pain level and cause him to suffer unnecessarily. I advised, that if the goal is truly to seek comfort and quiet, that full code status is not quitting or giving up but embracing a peaceful transition and avoiding unnecessary, potentially painful resuscitation.   He verbalized his appreciation that we have the chance to talk about this and he agreed that he doesn't like the sound of resuscitation now that we discuss it in depth. He would like to change code status to DNR tonight.    Patient also w/ poor pain management at this time and asking if we can increase his dilaudid. I agree and will order:  - increased dilaudid w/ appropriate range  - bladder scan and if retaining then order to wang cath  - change code status to DNR  - d/c order for frequent sodium checks    The family is in agreement with this plan.

## 2019-12-11 NOTE — PROGRESS NOTES
Patient alert and oriented. Vitals stable. IV infusing NS at 75 ml/hr with intermittent antibiotics. Left lower leg swollen with ace wrap, clean dry and intact. No complaints of during this shift. Mother is at bedside. Pt uses bed side commode to use the bathroom. Call light within reach. Bed is in the lowest position with wheels locked.  Will continue to monitor Patient re-evaluated this afternoon. Patient states he is in much better spirits as his daughter just came into town from [place]. Patient states his anxiety from this AM is much improved with PRN ativan. Patient states he wants to go home with hospice and understands he will remain in the hospital overnight. I explained to him the medical team's main concern is his comfort and on-call resident is happy to help adjust PRNs as needed if his pain or anxiety worsens.     Patient verbalized insight on his condition. He overall wants to go home and he would prefer minimal interventions moving forward. He requested AM lab draws be avoided tomorrow which is reasonable and will be respected. Patient does know that tube feeds will be attempted to be restarted which he wants but he understands they can be discontinued if they cause him discomfort. Point of tube feeds is to help with patient's hydration with a goal of making him more comforable.     Patient and family felt comfortable with above plan and their questions were answered. Family and patient instructed to have on-call resident paged if new concerns arise.

## 2019-12-11 NOTE — DIETARY
"Nutrition Support Assessment:  Day 1 of admit.  Tima Rizzo is a 49 y.o. male with admitting DX of intractable abdominal pain, gastric carcinoma.    Attempted to speak with pt at bedside, pt was sleeping and did not rouse to name. Pt appeared thin with severe fat loss noted by pronounced hollowness to orbitals, sunken eyes and cheeks; severe muscle loss noted by pronounced clavicles and squared shoulders. Pt is known to RD from previous admit when J-tube was placed.      Current problem list (none), relevant non-hospital problems:  1. Loss of weight  2. Dysphagia   3. Failure to thrive in adult  4. Severe protein-calorie malnutrition  5. Gastric cancer  6. Intractable vomiting with nausea     Assessment:  Estimated Nutritional Needs based on:   Height: 170.2 cm (5' 7.01\")  Weight: 46.3 kg (102 lb 1.2 oz)  Weight to Use in Calculations: 46.3 kg (102 lb 1.2 oz)  Ideal Body Weight: 67.1 kg (148 lb)  Percent Ideal Body Weight: 69  Body mass index is 15.98 kg/m²., BMI classification: underweight    Calculation/Equation: MSJ x 1.3-1.5 = 1896-5433 kcals/day; HBE x 1.3 - 1.5 = 8920-5292 kcals/day - pt with increased nutrition needs d/t hypermetabolic disease state  Total Calories / day: 1674 - 1931 (Calories / k - 42)  Total Grams Protein / day: 60 - 69  (Grams Protein / k.3 - 1.5)     Evaluation:   1. J-tube in place upon admit.  2. Per chart review, pt with 16% wt loss since last admit 10/30, which is severe.  3. Pt recently transitioned to comfort care, hospice pending.  4. Tube feeding to begin per pt wishes.  5. Pt is currently on a clear liquid diet.  6. Sodium 118, BUN 36  7. Per RN at bedside, pt reported intolerance of tube feeding at home; will provide 24 continuous tube feedings to promote tolerance.     Malnutrition Risk: Pt with severe chronic disease related malnutrition r/t gastric cancer as evidenced by 16% wt loss in 6 weeks and physical markers of severe fat and severe muscle loss as noted " above.      Recommendations/Plan:  1. Begin j-tube feeds per pt/MD. Start Fibersource HN @ 25 mL/hr and advance per protocol to goal rate of 60 mL/hr, providing 1728 kcals, 78 grams of protein and 1166 mL of free water per day.  2. Fluids per MD.  3. Diet upgrades per SLP.    RD will continue to follow.

## 2019-12-11 NOTE — PROGRESS NOTES
[facility] FAMILY MEDICINE PROGRESS NOTE     Attending: Andry Childers    Resident: Pavan Austin M.D., PGY-1    PATIENT: Tima Rizzo; [id number]; 1970    ID: 49 y.o. male admitted for gastric cancer, stage 4, with refractory abdominal pain and nausea.    SUBJECTIVE: No acute events overnight.  Patient has been treated with dilaudid and compazine.  He reports that he does not want a PCA.      OBJECTIVE:     Vitals:    12/10/19 1531 12/10/19 1600 12/10/19 1631 12/10/19 2143   BP: 130/88 130/94 120/86 111/79   Pulse: 79 79 80 70   Resp: 15 15 15 17   Temp:    36.9 °C (98.4 °F)   TempSrc:    Temporal   SpO2: 99% 98% 99% 96%   Weight:       Height:           Intake/Output Summary (Last 24 hours) at 12/11/2019 0625  Last data filed at 12/11/2019 0400  Gross per 24 hour   Intake 270 ml   Output 500 ml   Net -230 ml       PE:  General: No acute distress, resting comfortably in bed. Cachectic.  HEENT: NC/AT. PERRLA. EOMI. MMM  Cardiovascular: RRR with no M/R/G.  Respiratory: Symmetrical chest. CTAB with no W/R/R  Abdomen: Abdomen is very thin, soft, and ND. +BS. No masses noted. Diffusely tender. G-tube in place. Drain in place draining serous fluid.  EXT:  YUN, 5/5 strength, 2+ pulses   Neuro: non focal with no numbness, tingling or changes in sensation, Cranial nerves grossly intact    LABS:  Recent Labs     12/10/19  1320   WBC 9.5   RBC 4.35*   HEMOGLOBIN 13.0*   HEMATOCRIT 36.9*   MCV 84.8   MCH 29.9   RDW 48.3   PLATELETCT 308   MPV 9.1   NEUTSPOLYS 77.50*   LYMPHOCYTES 15.40*   MONOCYTES 6.40   EOSINOPHILS 0.10   BASOPHILS 0.20     Recent Labs     12/10/19  1320 12/11/19  0331   SODIUM 114* 118*   POTASSIUM 5.2 4.8   CHLORIDE 83* 90*   CO2 24 23   BUN 51* 36*   CREATININE 1.07 0.98   CALCIUM 8.8 8.1*   ALBUMIN 3.3  --      Estimated GFR/CRCL = CrCl cannot be calculated (Unknown ideal weight.).  Recent Labs     12/10/19  1320 12/11/19  0331   GLUCOSE 104* 95     Recent Labs     12/10/19  1320   ASTSGOT 19   ALTSGPT  27   TBILIRUBIN 0.3   ALKPHOSPHAT 50   GLOBULIN 2.3             No results for input(s): INR, APTT, FIBRINOGEN in the last 72 hours.    Invalid input(s): DIMER    MICROBIOLOGY:  none    IMAGING: no new imaging    MEDS:  Current Facility-Administered Medications   Medication Last Dose   • lactulose 20 GM/30ML solution 15 mL 15 mL at 12/11/19 0538   • senna-docusate (PERICOLACE or SENOKOT S) 8.6-50 MG per tablet 2 Tab 2 Tab at 12/11/19 0539    And   • polyethylene glycol/lytes (MIRALAX) PACKET 1 Packet      And   • magnesium hydroxide (MILK OF MAGNESIA) suspension 30 mL      And   • bisacodyl (DULCOLAX) suppository 10 mg     • ondansetron (ZOFRAN) syringe/vial injection 4 mg     • prochlorperazine (COMPAZINE) injection 10 mg 10 mg at 12/11/19 0544   • pantoprazole (PROTONIX) injection 40 mg 40 mg at 12/11/19 0538   • HYDROmorphone (DILAUDID) injection 2-5 mg 3 mg at 12/11/19 0539       PROBLEM LIST:  No problems updated.    ASSESSMENT/PLAN: 49 y.o. male admitted for refractory pain with nausea and vomiting. Patient has stage 4 gastric CA w/ peritoneal carcinomatosis.     # Stage 4 Gastric cancer with peritoneal carcinomatosis:  # Abdominal pain:  Patient has a history of gastric cancer for which he sees Dr. Keyes.  Patient takes oxycodone at home, and this has not been controlling his pain.  He has not been able to take PO medications.   Dr. Keyes was contacted in the ED who recommended referral to palliative and hospice.  Plan:  - dilaudid 2-5mg Q1 hour PRN for pain  - Will transition to fentanyl patch and liquid morphine via G-Tube  - consult to hospice/palliative, patient and family have chosen [facility]     # Nausea:  # Vomiting:  Patient has been nauseous and has been vomiting.  He takes compazine at home.  - zofran q4 hours PRN  - Compazine q6 hours PRN  - Pantoprazole daily IV     # Left Hydronephrosis:  Patient had CT abdomen/pelvis in the ED that showed moderate left hydronephrosis with delayed left  nephrogram. No right hydronephrosis.  Patient is not reporting problems with urination. Patient has been having normal bowel movements. Has a history of BPH.  Plan:  - Urology was consulted in the ED  - Bladder scan normal at 65mL  - patient likely DNR/DNI and establishing w/ hospice - hope that we can find minimally invasive way of managing urologic condition     # Hyponatremia:  Patient had a sodium level of 114 in the ED, and this has since resolved to 118.  Suspect chronic in nature - malnutrition and also hypovolemic hyponatremia  Patient has had decreasing amount of tube feeds.   Cachetic and dehydrated on Exam  Hypovolemic hyponatremia.   Extensive counseling - if healthy then he would merit close moniitoring, frequent blood draws and calculated management - this is in opposition to his current goals of care - will defer aggressive management   Plan:  - no frequent sodium level checks  - will restart tube feeds  - rally bag at 75/hour     # Malnutrition:  # Dehydration:  Patient is cachetic on exam.   Albumin normal at 3.3  His mother states he received his feeds for the past 2 days.  He appears very dehydrated with sodium of 114.  - fluid resuscitation with rally bag gently and slowly  - nutrition consult for tube feeds     # End of Life Care:  Patient has a terminal diagnosis.  Counseling w/ patient - initially desiring to be full code but after explanation he would like time to think about it  Plan  - Hospice referral  - Palliative care referral     ABX: none  DVT ppx - none - currently at baseline activity/risk  GI PPX: pantoprazole  Lines: G-Tube left abdomen, Port right chest, Drain left abdomen.  Diet: tube feeds per nutrition  Code Status: DNR/DNI     Disposition: Patient is inpatient. Patient to be seen by urology. Referrals to hospice and palliative.  CODE STATUS: DNR/DNI    Pavan Austin M.D., PGY-1  [facility]

## 2019-12-11 NOTE — PROGRESS NOTES
Per discussion with team, patient on comfort care. Orders to be changed again.  Recommended Fentanyl patch for pain control as patient unable to take extended release tablets.

## 2019-12-11 NOTE — PROGRESS NOTES
Edda from Lab called with critical result of sodium of 118 at 0430. Critical lab result read back to Edda.   Dr. Guajardo notified of critical lab result at 0435.  Critical lab result read back by Dr. Guajardo. No new orders.

## 2019-12-11 NOTE — DISCHARGE PLANNING
Agency/Facility Name: [facility]  Spoke To: Yamileth  Outcome: Will review referral and visit with patient at bedside sometime today.

## 2019-12-11 NOTE — PROGRESS NOTES
Received patient from ED. No visible signs of distress. Pt placed in temporary room while waiting for assigned room to be cleaned. Upon arrival to unit this RN noted that the two bags of IV fluids were empty and left unclamped without an IV pump. PINO drain was having high output requiring frequent emptying. This RN notified on call Dr about high output PINO drain and the possible bolusing of IV fluids, received orders to place PINO drain to hemovac canister.  Pt medicated throughout the night for pain and nausea. No emesis. Pt able to tolerate some sips of clears. Pt ambulatory by self, steady gate. Chest port accessed and draws back. J tube flushed and patent, Valentin valve changed.

## 2019-12-11 NOTE — ED NOTES
Dr. Moreira at bedside for re-evaluation, pt pain medication will change, pt code status changed. Floor called for pt report unable to take report Charge RN of receiving unit to be notified and I will receive a call back.

## 2019-12-11 NOTE — CONSULTS
[facility] [place] Consult/H&P Note    Primary Service: Urology  Attending: Andry Watters M.D.  Patient's Name/MRN: Tima Rizzo, [id number]    Admit Date:12/10/2019  Today's Date: 12/11/2019   Length of stay:  LOS: 1 day   Room #: T405/00      Reason for consult/chief complaint: hydronephrosis (left)  ID/HPI: Tima Rizzo is a 49 y.o. male patient with metastatic stage 4 gastric cancer and left hydronephrosis. We were consulted for possible stent placement vs NT. Information obtained from his mom and [facility].        Past Medical History:   Past Medical History:   Diagnosis Date   • Anesthesia     slow to wake up after EGD   • Anxiety    • Bell palsy 2000; 2010    x2; mild left facial droop   • Cancer (HCC) 2019    Gastric   • Enlarged prostate    • History of positive PPD, untreated    • Hypertension    • Numbness of arm     and hands, bilateral   • Pain     neck and arms   • Prediabetes    • Psychiatric problem     depression    • Snoring    • Urinary bladder disorder     frequent urination (enlarged prostate)        Past Surgical History:   Past Surgical History:   Procedure Laterality Date   • PB LAP,DIAGNOSTIC ABDOMEN  10/31/2019    Procedure: LAPAROSCOPY, DIAGNOSTIC, ROBOT-ASSISTED, USING DA KRISTINE XI;  Surgeon: Dyllan Cisneros M.D.;  Location: [facility];  Service: General   • PB PLACEMENT ENTEROSTOMY/CECOSTOMY TUBE OPEN  10/31/2019    Procedure: CREATION, JEJUNOSTOMY;  Surgeon: Dyllan Cisneros M.D.;  Location: [facility];  Service: General   • CATH PLACEMENT Right 6/13/2019    Procedure: INSERTION, CATHETER- CEPHALIC POWER PORT  ;  Surgeon: Dickson Simpson M.D.;  Location: [facility];  Service: General   • PB UPPER GI ENDOSCOPY,BIOPSY  6/12/2019    Procedure: GASTROSCOPY, WITH BIOPSY - POSSIBLE;  Surgeon: Bjorn Claudio M.D.;  Location: [facility];  Service: Gastroenterology   • PB UPPER GI ENDOSCOPY,W/DILAT,GASTRIC OUT   6/12/2019    Procedure: GASTROSCOPY, WITH BALLOON DILATION;  Surgeon: Bjorn Claudio M.D.;  [facility];  Service: Gastroenterology   • GASTROSCOPY  6/12/2019    Procedure: GASTROSCOPY;  Surgeon: Bjorn Claudio M.D.;  Location: [facility];  Service: Gastroenterology   • EGD W/ENDOSCOPIC ULTRASOUND  6/12/2019    Procedure: EGD, WITH ENDOSCOPIC US - UPPER, RADIAL;  Surgeon: Bjorn Claudio M.D.;  Location: [facility];  Service: Gastroenterology   • EGD WITH ASP/BX  6/12/2019    Procedure: EGD, WITH ASPIRATION BIOPSY - POSSIBLE;  Surgeon: Bjorn Claudio M.D.;  Location: [facility];  Service: Gastroenterology   • OTHER SURGICAL PROCEDURE  2014    cervical discectomy with plates        Family History:   No family history on file.      Social History:   Social History     Tobacco Use   • Smoking status: Current Every Day Smoker     Packs/day: 0.50     Years: 30.00     Pack years: 15.00     Types: Cigarettes   • Smokeless tobacco: Never Used   Substance Use Topics   • Alcohol use: Yes     Comment: 2 per year   • Drug use: Yes     Types: Marijuana     Comment: Cannabis, daily      Social History     Patient does not qualify to have social determinant information on file (likely too young).   Social History Narrative   • Not on file        Allergies: he Patient has no known allergies.    Medications:   Medications Prior to Admission   Medication Sig Dispense Refill Last Dose   • lactulose 20 GM/30ML Solution Take 15 mL by mouth 2 Times a Day.   12/10/2019 at AM   • oxycodone (OXY-IR) 15 MG immediate release tablet Take 15 mg by mouth every 3 days as needed for Severe Pain.   12/10/2019 at AM   • omeprazole (PRILOSEC) 40 MG delayed-release capsule Take 1 Cap by mouth every day. 30 Cap 1 12/10/2019 at AM   • prochlorperazine (COMPAZINE) 10 MG Tab Take 1 Tab by mouth every 6 hours as needed for Nausea/Vomiting. 30 Tab 3 12/10/2019 at AM   • Naloxone (NARCAN) 4  "MG/0.1ML Liquid Spray 4 mg in nose Once PRN (For opiod overdose symptoms.) for up to 1 dose. 1 Package 0 PRN at PRN         Review of Systems  Review of Systems   Unable to perform ROS: Acuity of condition        Physical Exam  VITAL SIGNS: /84   Pulse 76   Temp 36.3 °C (97.4 °F) (Temporal)   Resp 18   Ht 1.702 m (5' 7.01\")   Wt 46.3 kg (102 lb 1.2 oz)   SpO2 97%   BMI 15.98 kg/m²   Physical Exam   Constitutional:   Patient appears older than stated age. Sleeping comfortably. Unarousable.    Vitals reviewed.        Labs:  Recent Labs     12/10/19  1320   WBC 9.5   RBC 4.35*   HEMOGLOBIN 13.0*   HEMATOCRIT 36.9*   MCV 84.8   MCH 29.9   MCHC 35.2   RDW 48.3   PLATELETCT 308   MPV 9.1     Recent Labs     12/10/19  1320 12/11/19  0331   SODIUM 114* 118*   POTASSIUM 5.2 4.8   CHLORIDE 83* 90*   CO2 24 23   GLUCOSE 104* 95   BUN 51* 36*   CREATININE 1.07 0.98   CALCIUM 8.8 8.1*         Glucose:  Recent Labs     12/10/19  1320 12/11/19  0331   GLUCOSE 104* 95     Coags:  No results for input(s): INR in the last 72 hours.      Urinalysis:   No results for input(s): COLORURINE, CLARITY, SPECGRAVITY, PHURINE, GLUCOSEUR, KETONES, NITRITE, OCCULTBLOOD, RBCURINE, BACTERIA, EPITHELCELL in the last 72 hours.    Invalid input(s): BILRUBINUR, LEUESTERASE    Imaging:      Results for orders placed during the hospital encounter of 09/11/19   [id number] BASE TO MID-THIGH    Impression 1. Known gastric mass is smaller and with slightly decreased hypermetabolism, suggestive of treatment response.  2. No FDG avid metastatic disease.                                              Results for orders placed during the hospital encounter of 10/29/19   CT-CHEST,ABDOMEN,PELVIS WITH    Impression No evidence of thoracic metastases.    Interval placement of J-tube.    Apparent persistent gastric mass.    New mild bilateral hydronephrosis.                    Results for orders placed during the hospital encounter of 12/10/19   "   CT-ABDOMEN-PELVIS WITH    Impression 1.  Moderate left hydronephrosis with delayed left nephrogram. No right hydronephrosis.  2.  Known gastric mass with associated fat stranding around the stomach. No abdominopelvic metastatic disease is detected.  3.  Jejunostomy and percutaneous drain.                            Assessment/Recommendation   49 y.o.male with metastatic stage 4 gastric cancer and left hydronephrosis. Per mom, patient has minimal flank pain. Complains of mostly abdominal pain. Urinating without difficulty when hydrated.     · We discussed pain management via NT's. However, not indicated at this time as the source of his pain is not originating from the kidneys. Renal function is stable.   · Nothing emergent to do at this time. Urology will sign off. He will be discharged home to hospice care. Call if we can be of any further assistance.   · Dr. Denney is aware of this consultation and has directed the plan of care.      Yuliet Tirado, P.A.   [address] [place] [address]   [phone]      Case discussed with Dr. Cisneros as well; agrees no need for NT. Role for NT or ureteral stent would be for palliation of painful obstruction. In the absence of that, no  intervention recommended. noemí

## 2019-12-12 VITALS
HEART RATE: 85 BPM | WEIGHT: 102.07 LBS | BODY MASS INDEX: 16.02 KG/M2 | RESPIRATION RATE: 14 BRPM | SYSTOLIC BLOOD PRESSURE: 117 MMHG | HEIGHT: 67 IN | DIASTOLIC BLOOD PRESSURE: 87 MMHG | TEMPERATURE: 96.8 F | OXYGEN SATURATION: 98 %

## 2019-12-12 PROCEDURE — 700111 HCHG RX REV CODE 636 W/ 250 OVERRIDE (IP): Performed by: FAMILY MEDICINE

## 2019-12-12 PROCEDURE — 700111 HCHG RX REV CODE 636 W/ 250 OVERRIDE (IP): Performed by: STUDENT IN AN ORGANIZED HEALTH CARE EDUCATION/TRAINING PROGRAM

## 2019-12-12 PROCEDURE — 700101 HCHG RX REV CODE 250: Performed by: STUDENT IN AN ORGANIZED HEALTH CARE EDUCATION/TRAINING PROGRAM

## 2019-12-12 PROCEDURE — C9113 INJ PANTOPRAZOLE SODIUM, VIA: HCPCS | Performed by: STUDENT IN AN ORGANIZED HEALTH CARE EDUCATION/TRAINING PROGRAM

## 2019-12-12 PROCEDURE — 700102 HCHG RX REV CODE 250 W/ 637 OVERRIDE(OP): Performed by: FAMILY MEDICINE

## 2019-12-12 PROCEDURE — A9270 NON-COVERED ITEM OR SERVICE: HCPCS | Performed by: FAMILY MEDICINE

## 2019-12-12 RX ORDER — METHADONE HYDROCHLORIDE 5 MG/5ML
5 SOLUTION ORAL EVERY 4 HOURS PRN
Status: DISCONTINUED | OUTPATIENT
Start: 2019-12-12 | End: 2019-12-12

## 2019-12-12 RX ORDER — DEXAMETHASONE SODIUM PHOSPHATE 10 MG/ML
10 INJECTION, SOLUTION INTRAMUSCULAR; INTRAVENOUS ONCE
Status: COMPLETED | OUTPATIENT
Start: 2019-12-12 | End: 2019-12-12

## 2019-12-12 RX ORDER — METHADONE HYDROCHLORIDE 5 MG/5ML
5-10 SOLUTION ORAL EVERY 4 HOURS PRN
Status: DISCONTINUED | OUTPATIENT
Start: 2019-12-12 | End: 2019-12-12

## 2019-12-12 RX ORDER — FENTANYL 25 UG/1
1 PATCH TRANSDERMAL
Qty: 5 PATCH | Refills: 0 | Status: SHIPPED | OUTPATIENT
Start: 2019-12-14 | End: 2019-12-17

## 2019-12-12 RX ORDER — DEXAMETHASONE SODIUM PHOSPHATE 4 MG/ML
2 INJECTION, SOLUTION INTRA-ARTICULAR; INTRALESIONAL; INTRAMUSCULAR; INTRAVENOUS; SOFT TISSUE 2 TIMES DAILY
Status: DISCONTINUED | OUTPATIENT
Start: 2019-12-12 | End: 2019-12-12

## 2019-12-12 RX ORDER — METHADONE HYDROCHLORIDE 5 MG/5ML
5-10 SOLUTION ORAL EVERY 4 HOURS PRN
Qty: 210 ML | Refills: 0 | Status: SHIPPED | OUTPATIENT
Start: 2019-12-12 | End: 2019-12-15

## 2019-12-12 RX ORDER — METHADONE HYDROCHLORIDE 5 MG/5ML
5-10 SOLUTION ORAL EVERY 4 HOURS PRN
Status: DISCONTINUED | OUTPATIENT
Start: 2019-12-12 | End: 2019-12-12 | Stop reason: HOSPADM

## 2019-12-12 RX ORDER — DEXAMETHASONE SODIUM PHOSPHATE 4 MG/ML
2 INJECTION, SOLUTION INTRA-ARTICULAR; INTRALESIONAL; INTRAMUSCULAR; INTRAVENOUS; SOFT TISSUE 2 TIMES DAILY
Qty: 5 ML | Refills: 0 | Status: SHIPPED | OUTPATIENT
Start: 2019-12-12 | End: 2019-12-17

## 2019-12-12 RX ORDER — HYDROMORPHONE HYDROCHLORIDE 2 MG/ML
2 INJECTION, SOLUTION INTRAMUSCULAR; INTRAVENOUS; SUBCUTANEOUS ONCE
Status: COMPLETED | OUTPATIENT
Start: 2019-12-12 | End: 2019-12-12

## 2019-12-12 RX ORDER — HYDROMORPHONE HYDROCHLORIDE 2 MG/ML
2 INJECTION, SOLUTION INTRAMUSCULAR; INTRAVENOUS; SUBCUTANEOUS
Status: DISCONTINUED | OUTPATIENT
Start: 2019-12-12 | End: 2019-12-12

## 2019-12-12 RX ORDER — DEXAMETHASONE SODIUM PHOSPHATE 4 MG/ML
2 INJECTION, SOLUTION INTRA-ARTICULAR; INTRALESIONAL; INTRAMUSCULAR; INTRAVENOUS; SOFT TISSUE 2 TIMES DAILY
Status: DISCONTINUED | OUTPATIENT
Start: 2019-12-12 | End: 2019-12-12 | Stop reason: HOSPADM

## 2019-12-12 RX ORDER — DEXAMETHASONE SODIUM PHOSPHATE 10 MG/ML
10 INJECTION, SOLUTION INTRAMUSCULAR; INTRAVENOUS ONCE
Status: DISCONTINUED | OUTPATIENT
Start: 2019-12-12 | End: 2019-12-12

## 2019-12-12 RX ADMIN — HYDROMORPHONE HYDROCHLORIDE 2 MG: 2 INJECTION, SOLUTION INTRAMUSCULAR; INTRAVENOUS; SUBCUTANEOUS at 13:01

## 2019-12-12 RX ADMIN — MORPHINE SULFATE 20 MG: 10 SOLUTION ORAL at 03:14

## 2019-12-12 RX ADMIN — SENNOSIDES AND DOCUSATE SODIUM 2 TABLET: 8.6; 5 TABLET ORAL at 05:17

## 2019-12-12 RX ADMIN — DEXAMETHASONE SODIUM PHOSPHATE 10 MG: 10 INJECTION INTRAMUSCULAR; INTRAVENOUS at 07:03

## 2019-12-12 RX ADMIN — HYDROMORPHONE HYDROCHLORIDE 2 MG: 2 INJECTION, SOLUTION INTRAMUSCULAR; INTRAVENOUS; SUBCUTANEOUS at 05:16

## 2019-12-12 RX ADMIN — MORPHINE SULFATE 20 MG: 10 SOLUTION ORAL at 04:19

## 2019-12-12 RX ADMIN — HEPARIN 500 UNITS: 100 SYRINGE at 13:02

## 2019-12-12 RX ADMIN — DEXAMETHASONE SODIUM PHOSPHATE 2 MG: 4 INJECTION, SOLUTION INTRA-ARTICULAR; INTRALESIONAL; INTRAMUSCULAR; INTRAVENOUS; SOFT TISSUE at 13:01

## 2019-12-12 RX ADMIN — PANTOPRAZOLE SODIUM 40 MG: 40 INJECTION, POWDER, LYOPHILIZED, FOR SOLUTION INTRAVENOUS at 05:16

## 2019-12-12 RX ADMIN — PROCHLORPERAZINE EDISYLATE 10 MG: 5 INJECTION INTRAMUSCULAR; INTRAVENOUS at 11:49

## 2019-12-12 RX ADMIN — ONDANSETRON 4 MG: 2 INJECTION INTRAMUSCULAR; INTRAVENOUS at 10:36

## 2019-12-12 RX ADMIN — LACTULOSE 15 ML: 20 SOLUTION ORAL at 05:17

## 2019-12-12 RX ADMIN — METHADONE HYDROCHLORIDE 5 MG: 5 SOLUTION ORAL at 11:50

## 2019-12-12 RX ADMIN — HYDROMORPHONE HYDROCHLORIDE 2 MG: 2 INJECTION, SOLUTION INTRAMUSCULAR; INTRAVENOUS; SUBCUTANEOUS at 07:03

## 2019-12-12 NOTE — PROGRESS NOTES
Patient discharge per order. Given iv pain medication prior to port access removal.  Patient given discharge instructions.  Discussed importance of remembering fentanyl patch on right upper arm.  Patient given steroids early as per discussion with MD.  Hospice to meet up at house at 2 pm.  Attempted to take jane valve off [address]/LBRube however back flow would cause messy use.  Replaced with new one.  Patient taken down by wheelchair by me and placed in family vehicle and buckled in.

## 2019-12-12 NOTE — PROGRESS NOTES
Patient complaints of abdominal cramping an hour after starting tf at 25 cc and hour.  Reduced to 15 cc per patient request.

## 2019-12-12 NOTE — PROGRESS NOTES
Face to face with MD regarding overnight issues.  Mother again trying to dictate patient care.   She expressed need for bolus feedings at home.  Patient has J tube contraindicated and patient unable to tolerate 15 cc/h bolus feeds would only produce significant pain and discomfort. Discussed with MD that patient asked feedings be turned off.  I had a significant conversation with patient about talking to family about his desires and wishes, however mother appears to continue to be in conflict with those wishes despite clear conversation that patient son is his decision maker and patient still able to express his wants and needs.

## 2019-12-12 NOTE — PALLIATIVE CARE
Palliative Care follow-up  Consult received and EMR reviewed; case discussed with MD via TT to determine role of PC for this CC patient. Response from MD noting need with pain management assistance to get home on hospice. Discussed with FAUSTINA Samano who will see the patient 12/12.    Updated: MD/ROXY WEEMS    Plan: formal consult to follow    Thank you for allowing Palliative Care to participate in this patient's care. Please feel free to call [phone] with any questions or concerns.

## 2019-12-12 NOTE — DISCHARGE SUMMARY
FAMILY MEDICINE DISCHARGE SUMMARY     PATIENT ID:    Name:             Tima Rizzo   YOB: 1970  Age:                 49 y.o.  male   MRN:               [id number]  Address:         [address] [place] [address] [address]   [place] [id number]  Phone:            [phone] (home)    ADMISSION DATE: 12/10/2019    DISCHARGE DATE: 12/12/2019    DISCHARGE DIAGNOSES:   Gastric CA - metastatic  Carcinomatosis  Severe Hyponatremia  Cachexia  Malnutrition  Chronic pain  Refractory pain  Cancer related pain  PO intolerance  Vomiting  Anxiety  Hospice and end-of-life planning  Hydronephrosis  Abdominal pain    ATTENDING PHYSICIAN: Dr. Watters    SENIOR RESIDENT: Dr. Moreira, PGY3    DANIEL RESIDENT: Dr. Pavan Austin PGY1    CONSULTANTS:    Urology, oncology, hospice,     PROCEDURES:   none    LABS:  Recent Labs     12/10/19  1320   WBC 9.5   RBC 4.35*   HEMOGLOBIN 13.0*   HEMATOCRIT 36.9*   MCV 84.8   MCH 29.9   RDW 48.3   PLATELETCT 308   MPV 9.1   NEUTSPOLYS 77.50*   LYMPHOCYTES 15.40*   MONOCYTES 6.40   EOSINOPHILS 0.10   BASOPHILS 0.20     Recent Labs     12/10/19  1320 12/11/19  0331   SODIUM 114* 118*   POTASSIUM 5.2 4.8   CHLORIDE 83* 90*   CO2 24 23   GLUCOSE 104* 95   BUN 51* 36*     No results found for: CHOLSTRLTOT, LDL, HDL, TRIGLYCERIDE    Lab Results   Component Value Date/Time    TROPONINI <0.02 05/05/2019 09:52 AM       Lab Results   Component Value Date/Time    TROPONINI <0.02 05/05/2019 09:52 AM         IMAGING:   CT-ABDOMEN-PELVIS WITH   Final Result      1.  Moderate left hydronephrosis with delayed left nephrogram. No right hydronephrosis.   2.  Known gastric mass with associated fat stranding around the stomach. No abdominopelvic metastatic disease is detected.   3.  Jejunostomy and percutaneous drain.             HISTORY OF PRESENT ILLNESS:   Tima Rizzo is a 49 y.o. 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 Patient w/ ongoing severe pain that is refractory to outpt tx and also w/ continuous retching at home. Can tolerate only minimal feeds through j-tube and cannot take most of his meds through the j-tube. He has been taking high doses of his oxycodone at home without relief. He reports that his last bowel movement was yesterday. He has been unable to tolerate PO medications.      Dr. Keyes (oncology) just spoke w/ family and recommended hospice w/ end-of-life care for a dismal prognosis of only a few weeks.    HOSPITAL COURSE:   Gastric CA - end stage  - per dr. Keyes - oncology - on admission, he is no longer a cnadidate for ongoing chemo. He should be transitioned to hospice and have the chance to go home w/ adequate pain management. Re-consult w/ him confirmed this as best option for management    Hyponatremia  Profound 114- high risk anomalies and complications w/ corrections. Intensive nature of aggressive management is not consistent w/ patients and family's wishes. Counseling and education with them and they wished to ignore and avoid the management for hyponatremia  Single rally bag run at low rate first 24 hrs - nothing else by IV    Poor PO  Cachexia  Tube feeds as tolerated bc of pain related to j-tube use    Refractory pain w/ escalatin pain management:  - IV dilaudid - large volume required - continuously escalating  - Morphine IV and elixir - ineffective  - Fentanyl patch  - decadron  - methadone elixir    End of Life care  DNR/DNI on admission and going home w/ hospice  Significant decline day of discharge    DISCHARGE CONDITION:  guarded    DISPOSITION: Home w/ hospice    DISCHARGE MEDICATIONS:      Tima Rizzo   Home Medication Instructions JEANNA:[id number]    Printed on:12/12/19 1130   Medication Information                      dexamethasone (DECADRON) 4 MG/ML Solution  0.5 mL by Enteral Tube route 2 Times a Day for 5 days.             fentaNYL  (DURAGESIC) 25 MCG/HR PATCH 72 HR  Apply 1 Patch to skin as directed every 72 hours for 3 days.             lactulose 20 GM/30ML Solution  Take 15 mL by mouth 2 Times a Day.             methadone (DOLOPHINE) 5 MG/5ML Solution  Take 5-10 mL by mouth every four hours as needed (PRN pain 4-6 give 5 mg and pain 7-10 give 10 mg) for up to 3 days.             Naloxone (NARCAN) 4 MG/0.1ML Liquid  Spray 4 mg in nose Once PRN (For opiod overdose symptoms.) for up to 1 dose.             omeprazole (PRILOSEC) 40 MG delayed-release capsule  Take 1 Cap by mouth every day.             oxycodone (OXY-IR) 15 MG immediate release tablet  Take 15 mg by mouth every 3 days as needed for Severe Pain.             prochlorperazine (COMPAZINE) 10 MG Tab  Take 1 Tab by mouth every 6 hours as needed for Nausea/Vomiting.                 ACTIVITY:  None required    DIET: j-tube feeds as tolerated    DISCHARGE INSTRUCTIONS:    Instructions provided for medication use  To go home w/ hospice  Advised that family avail themselves of all resources possible through hospice    FOLLOW UP:   None needed    CC:   Genaro Moreira FM

## 2019-12-12 NOTE — CARE PLAN
Problem: Discharge Barriers/Planning  Goal: Patient's continuum of care needs will be met  Outcome: MET  Note:   Patient to home hospice

## 2019-12-12 NOTE — PROGRESS NOTES
Pt began experiencing elevated pain at 2000, this RN medicated pt twice, then attempted to contact [facility] team for updates on uncontrolled pain. Four attempts to contact MD were made before call back. This RN notified MD that pt was declining anxiety and was only expressing abdominal pain. Orders received to increase pain medication. Pt then stated that he was experiencing anxiety. Pt then medicated for pain, anxiety and nausea. Pt was then able to sleep comfortable for approximately four hours until waking up with uncontrolled pain again. This RN made two more attempts to medicate patient for pain without any relief. Pt again declining anxiety and declining anxiolytics. This RN paged MD and received orders for IV analgesics. Pt now resting comfortably. Will continue to monitor.    When patient stated pain level between 4-6, patient was in visibly more pain, rocking back and forth, groaning, tense body language, and crying.     Tube feed stopped at 2330 per patient request due to increased abdominal discomfort.

## 2019-12-12 NOTE — PROGRESS NOTES
[facility] [place]     Attending: Dr. Watters    Resident: Harish PGY3    PATIENT: Tima Rizzo; [id number]; 1970    ID: 49 y.o. male admitted for CA related pain - refractory to outpt tx and poor PO    SUBJECTIVE: pain was well controlled yesterday but w/ tube feeds it increases his abd discomfort and he was requiring numerous IV agents last night to control pain. Mom is very worried about his ability to go home if requiring IV pain meds.  Decadron dose this morning provided excellent relief of pain and patient sleeping comfortably right now    OBJECTIVE:     Vitals:    12/11/19 0747 12/11/19 1132 12/11/19 1537 12/11/19 1945   BP: 132/90 121/84 111/79 104/62   Pulse: 82 76 87 83   Resp: 18 18 18 18   Temp: 36.8 °C (98.3 °F) 36.3 °C (97.4 °F) 37.1 °C (98.7 °F) 37.5 °C (99.5 °F)   TempSrc: Temporal Temporal Temporal Oral   SpO2: 97% 97% 99% 96%   Weight:       Height:           Intake/Output Summary (Last 24 hours) at 12/12/2019 0752  Last data filed at 12/11/2019 2008  Gross per 24 hour   Intake 725 ml   Output 350 ml   Net 375 ml       PE:  General: cachectic , pale, curled up in bed  HEENT: bitemporal wasting  Cardiovascular: RRR with no M/R/G.  Respiratory: Symmetrical chest. CTAB with no W/R/R  Abdomen: soft, significantly tender - nonperitoneal  EXT:  YUN, skin warm and intact    LABS:  Recent Labs     12/10/19  1320   WBC 9.5   RBC 4.35*   HEMOGLOBIN 13.0*   HEMATOCRIT 36.9*   MCV 84.8   MCH 29.9   RDW 48.3   PLATELETCT 308   MPV 9.1   NEUTSPOLYS 77.50*   LYMPHOCYTES 15.40*   MONOCYTES 6.40   EOSINOPHILS 0.10   BASOPHILS 0.20     Recent Labs     12/10/19  1320 12/11/19  0331   SODIUM 114* 118*   POTASSIUM 5.2 4.8   CHLORIDE 83* 90*   CO2 24 23   BUN 51* 36*   CREATININE 1.07 0.98   CALCIUM 8.8 8.1*   ALBUMIN 3.3  --      Estimated GFR/CRCL = CrCl cannot be calculated (Unknown ideal weight.).  Recent Labs     12/10/19  1320 12/11/19  0331   GLUCOSE 104* 95     Recent Labs      12/10/19  1320   ASTSGOT 19   ALTSGPT 27   TBILIRUBIN 0.3   ALKPHOSPHAT 50   GLOBULIN 2.3             No results for input(s): INR, APTT, FIBRINOGEN in the last 72 hours.    Invalid input(s): DIMER    MICROBIOLOGY: none    IMAGING: none    MEDS:  Current Facility-Administered Medications   Medication Last Dose   • dexamethasone (DECADRON) injection 2 mg     • methadone (DOLOPHINE) 5 MG/5ML solution 5 mg     • lactulose 20 GM/30ML solution 15 mL 15 mL at 12/12/19 0517   • senna-docusate (PERICOLACE or SENOKOT S) 8.6-50 MG per tablet 2 Tab 2 Tab at 12/12/19 0517    And   • polyethylene glycol/lytes (MIRALAX) PACKET 1 Packet      And   • magnesium hydroxide (MILK OF MAGNESIA) suspension 30 mL      And   • bisacodyl (DULCOLAX) suppository 10 mg     • fentaNYL (DURAGESIC) 25 MCG/HR 1 Patch 1 Patch at 12/11/19 1118   • Pharmacy Consult: Enteral tube insertion - review meds/change route/product selection     • LORazepam (ATIVAN) injection 0.5 mg 0.5 mg at 12/11/19 2329   • ondansetron (ZOFRAN) syringe/vial injection 4 mg 4 mg at 12/11/19 0803   • prochlorperazine (COMPAZINE) injection 10 mg 10 mg at 12/11/19 2329   • pantoprazole (PROTONIX) injection 40 mg 40 mg at 12/12/19 0516       PROBLEM LIST:  No problems updated.    ASSESSMENT/PLAN:    49 y.o. male admitted for refractory pain with nausea and vomiting. Patient has stage 4 gastric CA w/ carcinomatosis.     # Stage 4 Gastric cancer with carcinomatosis  # Abdominal pain - CA related pain  Going home w hospice today  Breakthrough last night w/ dilaudid  Excellent relief this morning w/ decadron in J-tube  Plan:  - continue fentanyl patch 25 mcg  - decadron 2 mg BID  - starting methadone 5-10 mg Q4 hrs PRN pain  - plan to dc home today     # Nausea  # Vomiting  Adequately managed w/ PRN's  - zofran q4 hours PRN  - Compazine q6 hours PRN  - Pantoprazole daily IV     # Left Hydronephrosis  Patient had CT abdomen/pelvis in the ED that showed moderate left hydronephrosis  with delayed left nephrogram. No right hydronephrosis.  No difficulty voiding  Dr. Marcus evaluated - in setting of terminal dx w/ hospice, offered NT's but pt declined  Urology signed off  NTF     # Hyponatremia  Initially 114 in ER  Low rate rally bag and slowly corrected up to 118  In setting of terminal dx, goals of care are inconsistent w/ aggressive medical management  Plan:  No labs  PO as tolerated  No IVF needed today     # Malnutrition  # Dehydration  #Cachexia  Hospice  Tube feeds as tolerated  Got small amount of fluid first day  No more IVF at this time     # End of Life Care  Patient has a terminal diagnosis.  Goal is to get his pain adequately managed w/o IV meds and be able to go home today     ABX: none  DVT ppx - none - currently at baseline activity/risk  GI PPX: pantoprazole  Lines: G-Tube left abdomen, Port right chest, Drain left abdomen.  Diet: tube feeds per nutrition as tolerated  Code Status: DNR/DNI     Disposition: inpatient for refractory cancer-related pain - medically optimizing today - anticipate d/c today

## 2019-12-12 NOTE — PROGRESS NOTES
Per discussion with PM nurse. Overnight patient exceptionally painful.  Patient had minimal pain yesterday however tube feeds were started in PM and patient started expressing discomfort at that time.   Tube feeds stopped overnight.      Patient would benefit from increasing his fentanyl patch and switching to po dilaudid tablets.  Will discuss in rounds.

## 2019-12-12 NOTE — DISCHARGE INSTRUCTIONS
Discharge Instructions    Discharged to home by car with relative. Discharged via wheelchair, hospital escort: Yes.  Special equipment needed: Not Applicable    Be sure to schedule a follow-up appointment with your primary care doctor or any specialists as instructed.     Discharge Plan:   Diet Plan: Discussed  Activity Level: Discussed  Confirmed Follow up Appointment: No (Comments)  Confirmed Symptoms Management: Discussed  Medication Reconciliation Updated: Yes  Influenza Vaccine Indication: Patient Refuses    I understand that a diet low in cholesterol, fat, and sodium is recommended for good health. Unless I have been given specific instructions below for another diet, I accept this instruction as my diet prescription.   Other diet: as tolerared    Special Instructions:  Hospice  Hospice is a service that is designed to provide people who are terminally ill and their families with medical, spiritual, and psychological support. Its aim is to improve your quality of life by keeping you as alert and comfortable as possible. Hospice is performed by a team of health care professionals and volunteers who:  · Help keep you comfortable. Hospice can be provided in your home or in a homelike setting. The hospice staff works with your family and friends to help meet your needs. You will enjoy the support of loved ones by receiving much of your basic care from family and friends.  · Provide pain relief and manage your symptoms. The staff supply all necessary medicines and equipment.  · Provide companionship when you are alone.  · Allow you and your family to rest. They may do light housekeeping, prepare meals, and run errands.  · Provide counseling. They will make sure your emotional, spiritual, and social needs and those of your family are being met.  · Provide spiritual care. Spiritual care is individualized to meet your needs and your family's needs. It may involve helping you look at what death means to you, say  goodbye, or perform a specific L[employer]-day ceremony or ritual.  Hospice teams often include:  · A nurse.  · A doctor.  · Social workers.  · [employer] leaders (such as a ).  · Trained volunteers.  WHEN SHOULD HOSPICE CARE BEGIN?  Most people who use hospice are believed to have fewer than 6 months to live. Your family and health care providers can help you decide when hospice services should begin. If your condition improves, you may discontinue the program.  WHAT SHOULD I CONSIDER BEFORE SELECTING A PROGRAM?  Most hospice programs are run by nonprofit, independent organizations. Some are affiliated with hospitals, nursing homes, or home health care agencies. Hospice programs can take place in the home or at a hospice center, hospital, or skilled nursing facility. When choosing a hospice program, ask the following questions:  · What services are available to me?  · What services are offered to my loved ones?  · How involved are my loved ones?  · How involved is my health care provider?  · Who makes up the hospice care team? How are they trained or screened?  · How will my pain and symptoms be managed?  · If my circumstances change, can the services be provided in a different setting, such as my home or in the hospital?  · Is the program reviewed and licensed by the state or certified in some other way?  WHERE CAN I LEARN MORE ABOUT HOSPICE?  You can learn about existing hospice programs in your area from your health care providers. You can also read more about hospice online. The websites of the following organizations contain helpful information:  · The National Hospice and Palliative Care Organization (NHPCO).  · The Hospice Association of [place] (HAA).  · The Hospice Education [place].  · The American Cancer Society (ACS).  · Hospice Net.  This information is not intended to replace advice given to you by your health care provider. Make sure you discuss any questions you have with your health care  provider.  Document Released: 04/05/2005 Document Revised: 12/23/2014 Document Reviewed: 10/28/2014  [employer] Interactive Patient Education © 2017 [employer]      · Is patient discharged on Warfarin / Coumadin?   No     Depression / Suicide Risk    As you are discharged from this [facility] facility, it is important to learn how to keep safe from harming yourself.    Recognize the warning signs:  · Abrupt changes in personality, positive or negative- including increase in energy   · Giving away possessions  · Change in eating patterns- significant weight changes-  positive or negative  · Change in sleeping patterns- unable to sleep or sleeping all the time   · Unwillingness or inability to communicate  · Depression  · Unusual sadness, discouragement and loneliness  · Talk of wanting to die  · Neglect of personal appearance   · Rebelliousness- reckless behavior  · Withdrawal from people/activities they love  · Confusion- inability to concentrate     If you or a loved one observes any of these behaviors or has concerns about self-harm, here's what you can do:  · Talk about it- your feelings and reasons for harming yourself  · Remove any means that you might use to hurt yourself (examples: pills, rope, extension cords, firearm)  · Get professional help from the community (Mental Health, Substance Abuse, psychological counseling)  · Do not be alone:Call your Safe Contact- someone whom you trust who will be there for you.  · Call your local CRISIS HOTLINE 115-5094 or 234-359-3108  · Call your local Children's Mobile Crisis Response Team [place] (796) 083-9311 or www.[employer]  · Call the toll free National Suicide Prevention Hotlines   · National Suicide Prevention Lifeline 426-833-BBNC (4058)  · National Hope Line Network 800-SUICIDE (523-4743)

## 2019-12-12 NOTE — CARE PLAN
Problem: Knowledge Deficit  Goal: Knowledge of disease process/condition, treatment plan, diagnostic tests, and medications will improve  Outcome: PROGRESSING SLOWER THAN EXPECTED     Problem: Pain Management  Goal: Pain level will decrease to patient's comfort goal  Outcome: PROGRESSING SLOWER THAN EXPECTED     Problem: Psychosocial Needs:  Goal: Level of anxiety will decrease  Outcome: PROGRESSING SLOWER THAN EXPECTED

## 2020-03-16 NOTE — DOCUMENTATION QUERY
[facility]                                                                       Query Response Note      PATIENT:               JOSELINE SANTIAGO  ACCT #:                  [id number]  MRN:                     [id number]  :                      1970  ADMIT DATE:       12/10/2019 11:50 AM  DISCH DATE:        2019 1:44 PM  RESPONDING  PROVIDER #:        [id number]           QUERY TEXT:    Malnutrition is documented in the Medical Record by the RD as follows: Severe chronic disease related Malnutrition     NOTE:  If an appropriate response is not listed below, please respond with a new note.    The patient's Clinical Indicators include:  H&P: Cachectic, Protein calorie malnutrition   RD consult: Severe chronic disease related Malnutrition r/t gastric cancer as evidenced by 16% wt loss in 6 weeks and physical markers of severe fat and severe muscle loss. BMI 15.98  Treatment: Yue robin, RD consult, Begin j-tube feeds, RD to follow  Risk factors: Stage 4 Gastric cancer, Intractable pain with Nausea and vomiting, Cachectic  Options provided:   -- Agree with RD assessment: Severe protein calorie malnutrition   -- Disagree with RD Assessment   -- Unable to determine      Query created by: Connie Peñaloza on 2020 3:51 PM    RESPONSE TEXT:    Agree with RD assessment: Severe protein calorie malnutrition          Electronically signed by:  YAMILETH OLSON MD 3/16/2020 11:41 AM

## 2021-11-08 NOTE — DISCHARGE PLANNING
Agency/Facility Name: [facility]  Spoke To: Emilia  Outcome: Currently verifying PCP.     Agency/Facility Name: [facility]  Spoke To: Lucretia  Outcome: Currently in review with Medicare team. Needs a progress note stating need for enteral feeding for greater than 3 months along with diagnosis.     EMMANUEL Thomas notified.        Detail Level: Detailed Body Location Override (Optional - Billing Will Still Be Based On Selected Body Map Location If Applicable): right upper lip X Size Of Lesion In Cm (Optional): 0 Body Location Override (Optional - Billing Will Still Be Based On Selected Body Map Location If Applicable): left posterior neck

## (undated) DEVICE — GLOVE SZ 6.5 BIOGEL PI MICRO - PF LF (50PR/BX)

## (undated) DEVICE — DRAIN J-VAC 10MM FLAT - (10/CA)

## (undated) DEVICE — SYRINGE SAFETY 5 ML 18 GA X 1-1/2 BLUNT LL (100/BX 4BX/CA)

## (undated) DEVICE — KIT 18FR INITIAL PLACEMENT - (1/CA)

## (undated) DEVICE — ELECTRODE DUAL RETURN W/ CORD - (50/PK)

## (undated) DEVICE — DRAPE C-ARM LARGE 41IN X 74 IN - (10/BX 2BX/CA)

## (undated) DEVICE — ELECTRODE 850 FOAM ADHESIVE - HYDROGEL RADIOTRNSPRNT (50/PK)

## (undated) DEVICE — GOWN SURGEONS LARGE - (32/CA)

## (undated) DEVICE — TUBE CONNECT SUCTION CLEAR 120 X 1/4" (50EA/CA)"

## (undated) DEVICE — GLOVE BIOGEL SZ 8 SURGICAL PF LTX - (50PR/BX 4BX/CA)

## (undated) DEVICE — GUIDE TRACHE TUBE INTUBATING STYLET 5.0-10.0MM 14FR (20EA/PK)

## (undated) DEVICE — CANISTER SUCTION 3000ML MECHANICAL FILTER AUTO SHUTOFF MEDI-VAC NONSTERILE LF DISP  (40EA/CA)

## (undated) DEVICE — BLOCK BITE ENDOSCOPIC 2809 - (100/BX) INTERMEDIATE

## (undated) DEVICE — BAG DECANTER (50EA/CS)

## (undated) DEVICE — CATHETER IV SAFETY 20 GA X 1-1/4 (50/BX)

## (undated) DEVICE — DRAPE COLUMN  BOX OF 20

## (undated) DEVICE — BLADE SURGICAL #11 - (50/BX)

## (undated) DEVICE — SEAL 5MM-8MM UNIVERSAL  BOX OF 10

## (undated) DEVICE — MASK WITH FACE SHIELD (25/BX 4BX/CA)

## (undated) DEVICE — BLADE SURGICAL CLIPPER - (50EA/CA)

## (undated) DEVICE — KIT ANESTHESIA W/CIRCUIT & 3/LT BAG W/FILTER (20EA/CA)

## (undated) DEVICE — TUBE NG SALEM SUMP 16FR (50EA/CA)

## (undated) DEVICE — DECANTER FLD BLS - (50/CA)

## (undated) DEVICE — GLOVE BIOGEL PI ORTHO SZ 7.5 PF LF (40PR/BX)

## (undated) DEVICE — SUCTION INSTRUMENT YANKAUER BULBOUS TIP W/O VENT (50EA/CA)

## (undated) DEVICE — HEAD HOLDER JUNIOR/ADULT

## (undated) DEVICE — GLOVE, LITE (PAIR)

## (undated) DEVICE — NEPTUNE 4 PORT MANIFOLD - (20/PK)

## (undated) DEVICE — GOWN WARMING STANDARD FLEX - (30/CA)

## (undated) DEVICE — SUTURE GENERAL

## (undated) DEVICE — CANISTER SUCTION RIGID RED 1500CC (40EA/CA)

## (undated) DEVICE — GLOVE BIOGEL PI ORTHO SZ 6 1/2 SURGICAL PF LF (40PR/BX)

## (undated) DEVICE — COVER TIP ENDOWRIST HOT SHEAR - (10EA/BX) DA VINCI

## (undated) DEVICE — SYRINGE DISP. 50CC LS - (40/BX)

## (undated) DEVICE — TUBING CLEARLINK DUO-VENT - C-FLO (48EA/CA)

## (undated) DEVICE — SPONGE DRAIN 4 X 4IN 6-PLY - (2/PK25PK/BX12BX/CS)

## (undated) DEVICE — SODIUM CHL IRRIGATION 0.9% 1000ML (12EA/CA)

## (undated) DEVICE — SUTURE 3-0 VICRYL PLUS SH - 8X 18 INCH (12/BX)

## (undated) DEVICE — DRAPE LAPAROTOMY T SHEET - (12EA/CA)

## (undated) DEVICE — OBTURATOR BLADELESS STANDARD 8MM (6EA/BX)

## (undated) DEVICE — KIT ROOM DECONTAMINATION

## (undated) DEVICE — SUTURE 2-0 VICRYL PLUS SH - 27 INCH (36/BX)

## (undated) DEVICE — SYRINGE 10 ML CONTROL LL (25EA/BX 4BX/CA)

## (undated) DEVICE — MASK ANESTHESIA ADULT  - (100/CA)

## (undated) DEVICE — SET LEADWIRE 5 LEAD BEDSIDE DISPOSABLE ECG (1SET OF 5/EA)

## (undated) DEVICE — SENSOR SPO2 NEO LNCS ADHESIVE (20/BX) SEE USER NOTES

## (undated) DEVICE — Device

## (undated) DEVICE — RESERVOIR SUCTION 100 CC - SILICONE (20EA/CA)

## (undated) DEVICE — TUBE SUCTION YANKAUER  1/4 X 6FT (20EA/CA)"

## (undated) DEVICE — GOWN SURGEONS X-LARGE - DISP. (30/CA)

## (undated) DEVICE — ADHESIVE DERMABOND HVD MINI (12EA/BX)

## (undated) DEVICE — PROTECTOR ULNA NERVE - (36PR/CA)

## (undated) DEVICE — SENSOR SPO2 ADULT LNCS ADTX (20/BX) ORDER ITEM #19593

## (undated) DEVICE — KIT CUSTOM PROCEDURE SINGLE FOR ENDO  (15/CA)

## (undated) DEVICE — PACK MINOR BASIN - (2EA/CA)

## (undated) DEVICE — SYSTEM CLEARIFY VISUALIZATION (10EA/PK)

## (undated) DEVICE — ROBOTIC SURGERY SERVICES

## (undated) DEVICE — SUTURE 4-0 MONOCRYL PLUS PS-2 - 27 INCH (36/BX)

## (undated) DEVICE — KIT  I.V. START (100EA/CA)

## (undated) DEVICE — FORCEP RADIAL JAW 4 STANDARD CAPACITY W/NEEDLE 240CM (40EA/BX)

## (undated) DEVICE — TUBE E-T HI-LO CUFF 7.5MM (10EA/PK)

## (undated) DEVICE — DERMABOND ADVANCED - (12EA/BX)

## (undated) DEVICE — DRESSING NON ADHERENT 3 X 4 - STERILE (100/BX 12BX/CA)

## (undated) DEVICE — SYRINGE SAFETY 3 ML 18 GA X 1 1/2 BLUNT LL (100/BX 8BX/CA)

## (undated) DEVICE — GLOVE BIOGEL PI INDICATOR SZ 7.0 SURGICAL PF LF - (50/BX 4BX/CA)

## (undated) DEVICE — SYRINGE 30 ML LL (56/BX)

## (undated) DEVICE — SET EXTENSION WITH 2 PORTS (48EA/CA) ***PART #2C8610 IS A SUBSTITUTE*****

## (undated) DEVICE — TOWELS CLOTH SURGICAL - (4/PK 20PK/CA)

## (undated) DEVICE — SUTURE 0 SILK TIES (36PK/BX)

## (undated) DEVICE — DRAPE ARM  BOX OF 20

## (undated) DEVICE — CANNULA W/ SUPPLY TUBING O2 - (50/CA)

## (undated) DEVICE — SUTURE 2-0 ETHILON FS - (36/BX) 18 INCH

## (undated) DEVICE — NEEDLE SAFETY 18 GA X 1 1/2 IN (100EA/BX)

## (undated) DEVICE — ARMREST CRADLE FOAM - (2PR/PK 12PR/CA)

## (undated) DEVICE — NEEDLE INSFL 120MM 14GA VRRS - (20/BX)

## (undated) DEVICE — CHLORAPREP 26 ML APPLICATOR - ORANGE TINT(25/CA)

## (undated) DEVICE — SYRINGE SAFETY 10 ML 18 GA X 1 1/2 BLUNT LL (100/BX 4BX/CA)

## (undated) DEVICE — SLEEVE, VASO, THIGH, MED

## (undated) DEVICE — LACTATED RINGERS INJ 1000 ML - (14EA/CA 60CA/PF)

## (undated) DEVICE — DRAPE IOBAN II INCISE 23X17 - (10EA/BX 4BX/CA)

## (undated) DEVICE — TUBE GASTROSTOMY 18FR 20CC

## (undated) DEVICE — TUBE CONNECTING SUCTION - CLEAR PLASTIC STERILE 72 IN (50EA/CA)

## (undated) DEVICE — SODIUM CHL. INJ. 0.9% 500ML (24EA/CA 50CA/PF)

## (undated) DEVICE — SUTURE 2-0 PROLENE SH (36PK/BX)

## (undated) DEVICE — SPONGE GAUZE NON-STERILE 4X4 - (2000/CA 10PK/CA)

## (undated) DEVICE — STAPLER SKIN DISP - (6/BX 10BX/CA) VISISTAT